# Patient Record
Sex: FEMALE | Race: WHITE | Employment: UNEMPLOYED | URBAN - METROPOLITAN AREA
[De-identification: names, ages, dates, MRNs, and addresses within clinical notes are randomized per-mention and may not be internally consistent; named-entity substitution may affect disease eponyms.]

---

## 2017-01-20 ENCOUNTER — PATIENT MESSAGE (OUTPATIENT)
Dept: FAMILY MEDICINE CLINIC | Age: 62
End: 2017-01-20

## 2017-01-20 DIAGNOSIS — N95.0 POSTMENOPAUSAL VAGINAL BLEEDING: Primary | ICD-10-CM

## 2017-02-15 ENCOUNTER — OFFICE VISIT (OUTPATIENT)
Dept: FAMILY MEDICINE CLINIC | Age: 62
End: 2017-02-15

## 2017-02-15 VITALS
RESPIRATION RATE: 12 BRPM | BODY MASS INDEX: 29.32 KG/M2 | OXYGEN SATURATION: 97 % | TEMPERATURE: 97.1 F | DIASTOLIC BLOOD PRESSURE: 78 MMHG | WEIGHT: 176 LBS | SYSTOLIC BLOOD PRESSURE: 110 MMHG | HEART RATE: 89 BPM | HEIGHT: 65 IN

## 2017-02-15 DIAGNOSIS — E78.2 MIXED HYPERLIPIDEMIA: ICD-10-CM

## 2017-02-15 DIAGNOSIS — F32.A DEPRESSION, UNSPECIFIED DEPRESSION TYPE: Primary | ICD-10-CM

## 2017-02-15 DIAGNOSIS — N20.0 KIDNEY STONES: ICD-10-CM

## 2017-02-15 DIAGNOSIS — F32.A DEPRESSION, UNSPECIFIED DEPRESSION TYPE: ICD-10-CM

## 2017-02-15 DIAGNOSIS — I10 ESSENTIAL HYPERTENSION: ICD-10-CM

## 2017-02-15 DIAGNOSIS — R53.83 FATIGUE, UNSPECIFIED TYPE: ICD-10-CM

## 2017-02-15 DIAGNOSIS — M15.9 PRIMARY OSTEOARTHRITIS INVOLVING MULTIPLE JOINTS: ICD-10-CM

## 2017-02-15 DIAGNOSIS — J30.2 SEASONAL ALLERGIC RHINITIS, UNSPECIFIED ALLERGIC RHINITIS TRIGGER: ICD-10-CM

## 2017-02-15 LAB
ALBUMIN SERPL-MCNC: 4.4 G/DL (ref 3.9–4.9)
ALP BLD-CCNC: 61 U/L (ref 40–130)
ALT SERPL-CCNC: 16 U/L (ref 0–33)
ANION GAP SERPL CALCULATED.3IONS-SCNC: 12 MEQ/L (ref 7–13)
AST SERPL-CCNC: 19 U/L (ref 0–35)
BASOPHILS ABSOLUTE: 0.1 K/UL (ref 0–0.2)
BASOPHILS RELATIVE PERCENT: 0.9 %
BILIRUB SERPL-MCNC: 0.8 MG/DL (ref 0–1.2)
BILIRUBIN, POC: 0
BLOOD URINE, POC: 0
BUN BLDV-MCNC: 15 MG/DL (ref 8–23)
C-REACTIVE PROTEIN: 2.5 MG/L (ref 0–5)
CALCIUM SERPL-MCNC: 9 MG/DL (ref 8.6–10.2)
CHLORIDE BLD-SCNC: 103 MEQ/L (ref 98–107)
CHOLESTEROL, TOTAL: 209 MG/DL (ref 0–199)
CLARITY, POC: CLEAR
CO2: 25 MEQ/L (ref 22–29)
COLOR, POC: YELLOW
CREAT SERPL-MCNC: 0.58 MG/DL (ref 0.5–0.9)
EOSINOPHILS ABSOLUTE: 0.2 K/UL (ref 0–0.7)
EOSINOPHILS RELATIVE PERCENT: 3.6 %
FOLATE: 12.7 NG/ML (ref 7.3–26.1)
GFR AFRICAN AMERICAN: >60
GFR NON-AFRICAN AMERICAN: >60
GLOBULIN: 2.3 G/DL (ref 2.3–3.5)
GLUCOSE BLD-MCNC: 78 MG/DL (ref 74–109)
GLUCOSE URINE, POC: 0
HCT VFR BLD CALC: 40.9 % (ref 37–47)
HDLC SERPL-MCNC: 72 MG/DL (ref 40–59)
HEMOGLOBIN: 14.2 G/DL (ref 12–16)
IRON SATURATION: 28 % (ref 11–46)
IRON: 90 UG/DL (ref 37–145)
KETONES, POC: 0
LDL CHOLESTEROL CALCULATED: 117 MG/DL (ref 0–129)
LEUKOCYTE EST, POC: 0
LYMPHOCYTES ABSOLUTE: 2.1 K/UL (ref 1–4.8)
LYMPHOCYTES RELATIVE PERCENT: 32.1 %
MAGNESIUM: 2.3 MG/DL (ref 1.7–2.3)
MCH RBC QN AUTO: 32.3 PG (ref 27–31.3)
MCHC RBC AUTO-ENTMCNC: 34.7 % (ref 33–37)
MCV RBC AUTO: 93.2 FL (ref 82–100)
MONOCYTES ABSOLUTE: 0.5 K/UL (ref 0.2–0.8)
MONOCYTES RELATIVE PERCENT: 8 %
NEUTROPHILS ABSOLUTE: 3.6 K/UL (ref 1.4–6.5)
NEUTROPHILS RELATIVE PERCENT: 55.4 %
NITRITE, POC: 0
PDW BLD-RTO: 13.6 % (ref 11.5–14.5)
PH, POC: 5.5
PLATELET # BLD: 237 K/UL (ref 130–400)
POTASSIUM SERPL-SCNC: 3.5 MEQ/L (ref 3.5–5.1)
PROTEIN, POC: 0
RBC # BLD: 4.39 M/UL (ref 4.2–5.4)
RHEUMATOID FACTOR: <10 IU/ML (ref 0–14)
SEDIMENTATION RATE, ERYTHROCYTE: 10 MM (ref 0–30)
SODIUM BLD-SCNC: 140 MEQ/L (ref 132–144)
SPECIFIC GRAVITY, POC: 1020
TOTAL IRON BINDING CAPACITY: 317 UG/DL (ref 178–450)
TOTAL PROTEIN: 6.7 G/DL (ref 6.4–8.1)
TRIGL SERPL-MCNC: 100 MG/DL (ref 0–200)
UROBILINOGEN, POC: 0
VITAMIN B-12: 389 PG/ML (ref 211–946)
WBC # BLD: 6.5 K/UL (ref 4.8–10.8)

## 2017-02-15 PROCEDURE — 99214 OFFICE O/P EST MOD 30 MIN: CPT | Performed by: FAMILY MEDICINE

## 2017-02-15 PROCEDURE — 81003 URINALYSIS AUTO W/O SCOPE: CPT | Performed by: FAMILY MEDICINE

## 2017-02-15 RX ORDER — FLUTICASONE PROPIONATE 50 MCG
2 SPRAY, SUSPENSION (ML) NASAL DAILY
Qty: 1 BOTTLE | Refills: 5 | Status: SHIPPED | OUTPATIENT
Start: 2017-02-15 | End: 2018-03-28 | Stop reason: ALTCHOICE

## 2017-02-15 ASSESSMENT — ENCOUNTER SYMPTOMS
RHINORRHEA: 1
EYES NEGATIVE: 1
ALLERGIC/IMMUNOLOGIC NEGATIVE: 1
RESPIRATORY NEGATIVE: 1
GASTROINTESTINAL NEGATIVE: 1
SHORTNESS OF BREATH: 0

## 2017-02-18 LAB — ANA IGG, ELISA: NORMAL

## 2017-02-20 LAB
VITAMIN D2 AND D3, TOTAL: 49.1 NG/ML (ref 30–80)
VITAMIN D2, 25 HYDROXY: <1 NG/ML
VITAMIN D3,25 HYDROXY: 49.1 NG/ML

## 2017-03-01 ENCOUNTER — OFFICE VISIT (OUTPATIENT)
Dept: FAMILY MEDICINE CLINIC | Age: 62
End: 2017-03-01

## 2017-03-01 VITALS
WEIGHT: 172 LBS | OXYGEN SATURATION: 98 % | HEART RATE: 85 BPM | SYSTOLIC BLOOD PRESSURE: 112 MMHG | BODY MASS INDEX: 28.66 KG/M2 | DIASTOLIC BLOOD PRESSURE: 72 MMHG | TEMPERATURE: 98.6 F | RESPIRATION RATE: 12 BRPM | HEIGHT: 65 IN

## 2017-03-01 DIAGNOSIS — E78.2 MIXED HYPERLIPIDEMIA: ICD-10-CM

## 2017-03-01 DIAGNOSIS — I10 ESSENTIAL HYPERTENSION: ICD-10-CM

## 2017-03-01 DIAGNOSIS — K58.1 IRRITABLE BOWEL SYNDROME WITH CONSTIPATION: Primary | ICD-10-CM

## 2017-03-01 DIAGNOSIS — Z23 NEED FOR PROPHYLACTIC VACCINATION AGAINST DIPHTHERIA-TETANUS-PERTUSSIS (DTP): ICD-10-CM

## 2017-03-01 PROCEDURE — 99214 OFFICE O/P EST MOD 30 MIN: CPT | Performed by: FAMILY MEDICINE

## 2017-03-01 PROCEDURE — 90715 TDAP VACCINE 7 YRS/> IM: CPT | Performed by: FAMILY MEDICINE

## 2017-03-01 PROCEDURE — 90471 IMMUNIZATION ADMIN: CPT | Performed by: FAMILY MEDICINE

## 2017-03-01 ASSESSMENT — ENCOUNTER SYMPTOMS
GASTROINTESTINAL NEGATIVE: 1
RESPIRATORY NEGATIVE: 1
SHORTNESS OF BREATH: 0
EYES NEGATIVE: 1
SINUS PRESSURE: 1
ALLERGIC/IMMUNOLOGIC NEGATIVE: 1

## 2017-03-02 DIAGNOSIS — K58.1 IRRITABLE BOWEL SYNDROME WITH CONSTIPATION: ICD-10-CM

## 2017-03-14 ENCOUNTER — PATIENT MESSAGE (OUTPATIENT)
Dept: FAMILY MEDICINE CLINIC | Age: 62
End: 2017-03-14

## 2017-03-14 RX ORDER — AMOXICILLIN 500 MG/1
1 TABLET, FILM COATED ORAL 2 TIMES DAILY
Qty: 40 TABLET | Refills: 0 | Status: SHIPPED | OUTPATIENT
Start: 2017-03-14 | End: 2017-04-03

## 2017-03-14 RX ORDER — PREDNISONE 10 MG/1
30 TABLET ORAL DAILY
Qty: 15 TABLET | Refills: 0 | Status: SHIPPED | OUTPATIENT
Start: 2017-03-14 | End: 2017-03-19

## 2017-03-22 ENCOUNTER — PATIENT MESSAGE (OUTPATIENT)
Dept: FAMILY MEDICINE CLINIC | Age: 62
End: 2017-03-22

## 2017-03-22 DIAGNOSIS — J32.9 CHRONIC SINUSITIS, UNSPECIFIED LOCATION: Primary | ICD-10-CM

## 2017-03-23 ENCOUNTER — PATIENT MESSAGE (OUTPATIENT)
Dept: FAMILY MEDICINE CLINIC | Age: 62
End: 2017-03-23

## 2017-03-23 RX ORDER — CEFDINIR 300 MG/1
300 CAPSULE ORAL 2 TIMES DAILY
Qty: 20 CAPSULE | Refills: 0 | Status: SHIPPED | OUTPATIENT
Start: 2017-03-23 | End: 2017-04-02

## 2017-04-13 ENCOUNTER — OFFICE VISIT (OUTPATIENT)
Dept: GASTROENTEROLOGY | Age: 62
End: 2017-04-13

## 2017-04-13 VITALS
DIASTOLIC BLOOD PRESSURE: 76 MMHG | BODY MASS INDEX: 28.62 KG/M2 | WEIGHT: 172 LBS | HEART RATE: 85 BPM | SYSTOLIC BLOOD PRESSURE: 113 MMHG

## 2017-04-13 DIAGNOSIS — R10.12 LUQ ABDOMINAL PAIN: Primary | ICD-10-CM

## 2017-04-13 DIAGNOSIS — R19.4 CHANGE IN BOWEL HABIT: ICD-10-CM

## 2017-04-13 DIAGNOSIS — K21.9 GASTROESOPHAGEAL REFLUX DISEASE WITHOUT ESOPHAGITIS: ICD-10-CM

## 2017-04-13 PROCEDURE — 99203 OFFICE O/P NEW LOW 30 MIN: CPT | Performed by: INTERNAL MEDICINE

## 2017-04-18 ENCOUNTER — HOSPITAL ENCOUNTER (OUTPATIENT)
Dept: CT IMAGING | Age: 62
Discharge: HOME | End: 2017-04-18
Payer: COMMERCIAL

## 2017-04-18 DIAGNOSIS — J32.9 RECURRENT SINUS INFECTIONS: ICD-10-CM

## 2017-04-18 PROCEDURE — 70486 CT MAXILLOFACIAL W/O DYE: CPT

## 2017-05-31 PROBLEM — M47.812 CERVICAL SPONDYLOSIS WITHOUT MYELOPATHY: Status: ACTIVE | Noted: 2017-05-31

## 2017-06-13 ENCOUNTER — TELEPHONE (OUTPATIENT)
Dept: FAMILY MEDICINE CLINIC | Age: 62
End: 2017-06-13

## 2017-07-26 ENCOUNTER — OFFICE VISIT (OUTPATIENT)
Dept: FAMILY MEDICINE CLINIC | Age: 62
End: 2017-07-26

## 2017-07-26 VITALS
RESPIRATION RATE: 15 BRPM | BODY MASS INDEX: 28.79 KG/M2 | OXYGEN SATURATION: 98 % | WEIGHT: 173 LBS | HEART RATE: 66 BPM | TEMPERATURE: 96.5 F | SYSTOLIC BLOOD PRESSURE: 118 MMHG | DIASTOLIC BLOOD PRESSURE: 70 MMHG

## 2017-07-26 DIAGNOSIS — R10.12 LEFT UPPER QUADRANT PAIN: Primary | ICD-10-CM

## 2017-07-26 DIAGNOSIS — N95.1 POSTMENOPAUSAL HYPERHIDROSIS: ICD-10-CM

## 2017-07-26 DIAGNOSIS — R61 POSTMENOPAUSAL HYPERHIDROSIS: ICD-10-CM

## 2017-07-26 PROCEDURE — 99213 OFFICE O/P EST LOW 20 MIN: CPT | Performed by: FAMILY MEDICINE

## 2017-07-26 RX ORDER — GLYCOPYRROLATE 1 MG/1
1 TABLET ORAL 3 TIMES DAILY
Qty: 90 TABLET | Refills: 3 | Status: SHIPPED | OUTPATIENT
Start: 2017-07-26 | End: 2017-08-07

## 2017-07-26 ASSESSMENT — ENCOUNTER SYMPTOMS
DIARRHEA: 0
FLATUS: 0
ABDOMINAL PAIN: 1
BELCHING: 0
HEMATOCHEZIA: 0
NAUSEA: 0
CONSTIPATION: 0
VOMITING: 0

## 2017-07-26 ASSESSMENT — CROHNS DISEASE ACTIVITY INDEX (CDAI): CDAI SCORE: 0

## 2017-07-27 DIAGNOSIS — R10.12 LEFT UPPER QUADRANT PAIN: ICD-10-CM

## 2017-07-28 LAB — H PYLORI BREATH TEST: NEGATIVE

## 2017-08-07 ENCOUNTER — HOSPITAL ENCOUNTER (OUTPATIENT)
Dept: PREADMISSION TESTING | Age: 62
Discharge: HOME | End: 2017-08-07
Payer: COMMERCIAL

## 2017-08-07 VITALS
OXYGEN SATURATION: 98 % | HEIGHT: 64 IN | TEMPERATURE: 98.9 F | BODY MASS INDEX: 29.37 KG/M2 | SYSTOLIC BLOOD PRESSURE: 128 MMHG | DIASTOLIC BLOOD PRESSURE: 75 MMHG | RESPIRATION RATE: 16 BRPM | HEART RATE: 64 BPM | WEIGHT: 172 LBS

## 2017-08-07 DIAGNOSIS — J34.3 HYPERTROPHY OF NASAL TURBINATES: Chronic | ICD-10-CM

## 2017-08-07 DIAGNOSIS — J34.2 DNS (DEVIATED NASAL SEPTUM): Chronic | ICD-10-CM

## 2017-08-07 PROBLEM — J32.9 CHRONIC SINUSITIS: Chronic | Status: ACTIVE | Noted: 2017-08-07

## 2017-08-07 LAB
ABO/RH: NORMAL
ANION GAP SERPL CALCULATED.3IONS-SCNC: 13 MEQ/L (ref 7–13)
ANTIBODY SCREEN: NORMAL
BUN BLDV-MCNC: 11 MG/DL (ref 8–23)
CALCIUM SERPL-MCNC: 9.1 MG/DL (ref 8.6–10.2)
CHLORIDE BLD-SCNC: 100 MEQ/L (ref 98–107)
CO2: 29 MEQ/L (ref 22–29)
CREAT SERPL-MCNC: 0.7 MG/DL (ref 0.5–0.9)
EKG ATRIAL RATE: 54 BPM
EKG P AXIS: 46 DEGREES
EKG P-R INTERVAL: 198 MS
EKG Q-T INTERVAL: 432 MS
EKG QRS DURATION: 90 MS
EKG QTC CALCULATION (BAZETT): 409 MS
EKG R AXIS: 33 DEGREES
EKG T AXIS: 30 DEGREES
EKG VENTRICULAR RATE: 54 BPM
GFR AFRICAN AMERICAN: >60
GFR NON-AFRICAN AMERICAN: >60
GLUCOSE BLD-MCNC: 80 MG/DL (ref 74–109)
HCT VFR BLD CALC: 42.4 % (ref 37–47)
HEMOGLOBIN: 14.4 G/DL (ref 12–16)
MCH RBC QN AUTO: 31.6 PG (ref 27–31.3)
MCHC RBC AUTO-ENTMCNC: 33.9 % (ref 33–37)
MCV RBC AUTO: 93 FL (ref 82–100)
PDW BLD-RTO: 12.8 % (ref 11.5–14.5)
PLATELET # BLD: 217 K/UL (ref 130–400)
POTASSIUM SERPL-SCNC: 4.1 MEQ/L (ref 3.5–5.1)
RBC # BLD: 4.56 M/UL (ref 4.2–5.4)
SODIUM BLD-SCNC: 142 MEQ/L (ref 132–144)
WBC # BLD: 6.2 K/UL (ref 4.8–10.8)

## 2017-08-07 PROCEDURE — 80048 BASIC METABOLIC PNL TOTAL CA: CPT

## 2017-08-07 PROCEDURE — 86901 BLOOD TYPING SEROLOGIC RH(D): CPT

## 2017-08-07 PROCEDURE — 86900 BLOOD TYPING SEROLOGIC ABO: CPT

## 2017-08-07 PROCEDURE — 86850 RBC ANTIBODY SCREEN: CPT

## 2017-08-07 PROCEDURE — 85027 COMPLETE CBC AUTOMATED: CPT

## 2017-08-07 PROCEDURE — 93005 ELECTROCARDIOGRAM TRACING: CPT

## 2017-08-07 RX ORDER — SODIUM CHLORIDE 0.9 % (FLUSH) 0.9 %
10 SYRINGE (ML) INJECTION PRN
Status: CANCELLED | OUTPATIENT
Start: 2017-08-07

## 2017-08-07 RX ORDER — LIDOCAINE HYDROCHLORIDE 10 MG/ML
1 INJECTION, SOLUTION EPIDURAL; INFILTRATION; INTRACAUDAL; PERINEURAL
Status: CANCELLED | OUTPATIENT
Start: 2017-08-07 | End: 2017-08-07

## 2017-08-07 RX ORDER — SODIUM CHLORIDE, SODIUM LACTATE, POTASSIUM CHLORIDE, CALCIUM CHLORIDE 600; 310; 30; 20 MG/100ML; MG/100ML; MG/100ML; MG/100ML
INJECTION, SOLUTION INTRAVENOUS CONTINUOUS
Status: CANCELLED | OUTPATIENT
Start: 2017-08-07

## 2017-08-07 RX ORDER — SODIUM CHLORIDE 0.9 % (FLUSH) 0.9 %
10 SYRINGE (ML) INJECTION EVERY 12 HOURS SCHEDULED
Status: CANCELLED | OUTPATIENT
Start: 2017-08-07

## 2017-08-07 ASSESSMENT — ENCOUNTER SYMPTOMS
BACK PAIN: 1
VOMITING: 0
DOUBLE VISION: 0
WHEEZING: 0
DIARRHEA: 1
SORE THROAT: 1
EYES NEGATIVE: 1
SHORTNESS OF BREATH: 0
NAUSEA: 0
EYE PAIN: 0
COUGH: 0
CONSTIPATION: 1
BLURRED VISION: 0
RESPIRATORY NEGATIVE: 1
HEARTBURN: 0

## 2017-08-10 ENCOUNTER — HOSPITAL ENCOUNTER (OUTPATIENT)
Dept: GENERAL RADIOLOGY | Age: 62
Setting detail: OUTPATIENT SURGERY
Discharge: HOME | End: 2017-08-10
Attending: OTOLARYNGOLOGY
Payer: COMMERCIAL

## 2017-08-10 ENCOUNTER — ANESTHESIA (OUTPATIENT)
Dept: OPERATING ROOM | Age: 62
End: 2017-08-10
Payer: COMMERCIAL

## 2017-08-10 ENCOUNTER — HOSPITAL ENCOUNTER (OUTPATIENT)
Age: 62
Setting detail: OUTPATIENT SURGERY
Discharge: HOME | End: 2017-08-10
Attending: OTOLARYNGOLOGY | Admitting: OTOLARYNGOLOGY
Payer: COMMERCIAL

## 2017-08-10 ENCOUNTER — ANESTHESIA EVENT (OUTPATIENT)
Dept: OPERATING ROOM | Age: 62
End: 2017-08-10
Payer: COMMERCIAL

## 2017-08-10 VITALS
DIASTOLIC BLOOD PRESSURE: 73 MMHG | OXYGEN SATURATION: 92 % | HEART RATE: 88 BPM | SYSTOLIC BLOOD PRESSURE: 126 MMHG | RESPIRATION RATE: 14 BRPM | TEMPERATURE: 98.1 F

## 2017-08-10 VITALS — OXYGEN SATURATION: 99 % | DIASTOLIC BLOOD PRESSURE: 66 MMHG | SYSTOLIC BLOOD PRESSURE: 150 MMHG | TEMPERATURE: 86.5 F

## 2017-08-10 DIAGNOSIS — R52 PAIN: ICD-10-CM

## 2017-08-10 PROCEDURE — 3700000000 HC ANESTHESIA ATTENDED CARE: Performed by: OTOLARYNGOLOGY

## 2017-08-10 PROCEDURE — 2580000003 HC RX 258: Performed by: STUDENT IN AN ORGANIZED HEALTH CARE EDUCATION/TRAINING PROGRAM

## 2017-08-10 PROCEDURE — 6360000002 HC RX W HCPCS: Performed by: ANESTHESIOLOGY

## 2017-08-10 PROCEDURE — 3209999900 FLUORO FOR SURGICAL PROCEDURES

## 2017-08-10 PROCEDURE — 7100000000 HC PACU RECOVERY - FIRST 15 MIN: Performed by: OTOLARYNGOLOGY

## 2017-08-10 PROCEDURE — 3600000014 HC SURGERY LEVEL 4 ADDTL 15MIN: Performed by: OTOLARYNGOLOGY

## 2017-08-10 PROCEDURE — 7100000011 HC PHASE II RECOVERY - ADDTL 15 MIN: Performed by: OTOLARYNGOLOGY

## 2017-08-10 PROCEDURE — 2500000003 HC RX 250 WO HCPCS: Performed by: OTOLARYNGOLOGY

## 2017-08-10 PROCEDURE — 2500000003 HC RX 250 WO HCPCS: Performed by: STUDENT IN AN ORGANIZED HEALTH CARE EDUCATION/TRAINING PROGRAM

## 2017-08-10 PROCEDURE — 7100000010 HC PHASE II RECOVERY - FIRST 15 MIN: Performed by: OTOLARYNGOLOGY

## 2017-08-10 PROCEDURE — 6370000000 HC RX 637 (ALT 250 FOR IP): Performed by: OTOLARYNGOLOGY

## 2017-08-10 PROCEDURE — 7100000001 HC PACU RECOVERY - ADDTL 15 MIN: Performed by: OTOLARYNGOLOGY

## 2017-08-10 PROCEDURE — 3600000004 HC SURGERY LEVEL 4 BASE: Performed by: OTOLARYNGOLOGY

## 2017-08-10 PROCEDURE — 88311 DECALCIFY TISSUE: CPT

## 2017-08-10 PROCEDURE — 2500000003 HC RX 250 WO HCPCS: Performed by: NURSE ANESTHETIST, CERTIFIED REGISTERED

## 2017-08-10 PROCEDURE — 2720000010 HC SURG SUPPLY STERILE: Performed by: OTOLARYNGOLOGY

## 2017-08-10 PROCEDURE — 6360000002 HC RX W HCPCS: Performed by: OTOLARYNGOLOGY

## 2017-08-10 PROCEDURE — 2580000003 HC RX 258: Performed by: OTOLARYNGOLOGY

## 2017-08-10 PROCEDURE — C1729 CATH, DRAINAGE: HCPCS | Performed by: OTOLARYNGOLOGY

## 2017-08-10 PROCEDURE — 88304 TISSUE EXAM BY PATHOLOGIST: CPT

## 2017-08-10 PROCEDURE — 6370000000 HC RX 637 (ALT 250 FOR IP): Performed by: ANESTHESIOLOGY

## 2017-08-10 PROCEDURE — 2720000003 HC MISC SUTURE/STAPLES/RELOADS/ETC: Performed by: OTOLARYNGOLOGY

## 2017-08-10 PROCEDURE — 6360000002 HC RX W HCPCS: Performed by: NURSE ANESTHETIST, CERTIFIED REGISTERED

## 2017-08-10 PROCEDURE — C1887 CATHETER, GUIDING: HCPCS | Performed by: OTOLARYNGOLOGY

## 2017-08-10 PROCEDURE — 3700000001 HC ADD 15 MINUTES (ANESTHESIA): Performed by: OTOLARYNGOLOGY

## 2017-08-10 PROCEDURE — 2740000010 HC MISC ORTHOTIC: Performed by: OTOLARYNGOLOGY

## 2017-08-10 RX ORDER — SCOLOPAMINE TRANSDERMAL SYSTEM 1 MG/1
1 PATCH, EXTENDED RELEASE TRANSDERMAL ONCE
Status: DISCONTINUED | OUTPATIENT
Start: 2017-08-10 | End: 2017-08-10 | Stop reason: HOSPADM

## 2017-08-10 RX ORDER — HYDROCODONE BITARTRATE AND ACETAMINOPHEN 5; 325 MG/1; MG/1
1 TABLET ORAL PRN
Status: DISCONTINUED | OUTPATIENT
Start: 2017-08-10 | End: 2017-08-10 | Stop reason: HOSPADM

## 2017-08-10 RX ORDER — ONDANSETRON 2 MG/ML
INJECTION INTRAMUSCULAR; INTRAVENOUS PRN
Status: DISCONTINUED | OUTPATIENT
Start: 2017-08-10 | End: 2017-08-10 | Stop reason: SDUPTHER

## 2017-08-10 RX ORDER — FENTANYL CITRATE 50 UG/ML
50 INJECTION, SOLUTION INTRAMUSCULAR; INTRAVENOUS EVERY 10 MIN PRN
Status: DISCONTINUED | OUTPATIENT
Start: 2017-08-10 | End: 2017-08-10 | Stop reason: HOSPADM

## 2017-08-10 RX ORDER — SODIUM CHLORIDE 0.9 % (FLUSH) 0.9 %
10 SYRINGE (ML) INJECTION PRN
Status: DISCONTINUED | OUTPATIENT
Start: 2017-08-10 | End: 2017-08-10 | Stop reason: HOSPADM

## 2017-08-10 RX ORDER — HYDROCODONE BITARTRATE AND ACETAMINOPHEN 5; 325 MG/1; MG/1
2 TABLET ORAL PRN
Status: DISCONTINUED | OUTPATIENT
Start: 2017-08-10 | End: 2017-08-10 | Stop reason: HOSPADM

## 2017-08-10 RX ORDER — MAGNESIUM HYDROXIDE 1200 MG/15ML
LIQUID ORAL CONTINUOUS PRN
Status: DISCONTINUED | OUTPATIENT
Start: 2017-08-10 | End: 2017-08-10 | Stop reason: HOSPADM

## 2017-08-10 RX ORDER — ROCURONIUM BROMIDE 10 MG/ML
INJECTION, SOLUTION INTRAVENOUS PRN
Status: DISCONTINUED | OUTPATIENT
Start: 2017-08-10 | End: 2017-08-10 | Stop reason: SDUPTHER

## 2017-08-10 RX ORDER — ONDANSETRON 2 MG/ML
4 INJECTION INTRAMUSCULAR; INTRAVENOUS EVERY 6 HOURS PRN
Status: DISCONTINUED | OUTPATIENT
Start: 2017-08-10 | End: 2017-08-10 | Stop reason: HOSPADM

## 2017-08-10 RX ORDER — MEPERIDINE HYDROCHLORIDE 25 MG/ML
12.5 INJECTION INTRAMUSCULAR; INTRAVENOUS; SUBCUTANEOUS EVERY 5 MIN PRN
Status: DISCONTINUED | OUTPATIENT
Start: 2017-08-10 | End: 2017-08-10 | Stop reason: HOSPADM

## 2017-08-10 RX ORDER — LIDOCAINE HYDROCHLORIDE AND EPINEPHRINE 10; 10 MG/ML; UG/ML
INJECTION, SOLUTION INFILTRATION; PERINEURAL PRN
Status: DISCONTINUED | OUTPATIENT
Start: 2017-08-10 | End: 2017-08-10 | Stop reason: HOSPADM

## 2017-08-10 RX ORDER — ONDANSETRON 2 MG/ML
4 INJECTION INTRAMUSCULAR; INTRAVENOUS
Status: DISCONTINUED | OUTPATIENT
Start: 2017-08-10 | End: 2017-08-10 | Stop reason: HOSPADM

## 2017-08-10 RX ORDER — LIDOCAINE HYDROCHLORIDE 20 MG/ML
INJECTION, SOLUTION EPIDURAL; INFILTRATION; INTRACAUDAL; PERINEURAL PRN
Status: DISCONTINUED | OUTPATIENT
Start: 2017-08-10 | End: 2017-08-10 | Stop reason: SDUPTHER

## 2017-08-10 RX ORDER — SODIUM CHLORIDE, SODIUM LACTATE, POTASSIUM CHLORIDE, CALCIUM CHLORIDE 600; 310; 30; 20 MG/100ML; MG/100ML; MG/100ML; MG/100ML
INJECTION, SOLUTION INTRAVENOUS CONTINUOUS
Status: DISCONTINUED | OUTPATIENT
Start: 2017-08-10 | End: 2017-08-10 | Stop reason: HOSPADM

## 2017-08-10 RX ORDER — PROPOFOL 10 MG/ML
INJECTION, EMULSION INTRAVENOUS PRN
Status: DISCONTINUED | OUTPATIENT
Start: 2017-08-10 | End: 2017-08-10 | Stop reason: SDUPTHER

## 2017-08-10 RX ORDER — SODIUM CHLORIDE 0.9 % (FLUSH) 0.9 %
10 SYRINGE (ML) INJECTION PRN
Status: CANCELLED | OUTPATIENT
Start: 2017-08-10

## 2017-08-10 RX ORDER — LIDOCAINE HYDROCHLORIDE 10 MG/ML
1 INJECTION, SOLUTION EPIDURAL; INFILTRATION; INTRACAUDAL; PERINEURAL
Status: COMPLETED | OUTPATIENT
Start: 2017-08-10 | End: 2017-08-10

## 2017-08-10 RX ORDER — ACETAMINOPHEN 325 MG/1
650 TABLET ORAL EVERY 4 HOURS PRN
Status: DISCONTINUED | OUTPATIENT
Start: 2017-08-10 | End: 2017-08-10 | Stop reason: HOSPADM

## 2017-08-10 RX ORDER — DEXAMETHASONE SODIUM PHOSPHATE 10 MG/ML
INJECTION INTRAMUSCULAR; INTRAVENOUS PRN
Status: DISCONTINUED | OUTPATIENT
Start: 2017-08-10 | End: 2017-08-10 | Stop reason: SDUPTHER

## 2017-08-10 RX ORDER — DIPHENHYDRAMINE HYDROCHLORIDE 50 MG/ML
12.5 INJECTION INTRAMUSCULAR; INTRAVENOUS
Status: DISCONTINUED | OUTPATIENT
Start: 2017-08-10 | End: 2017-08-10 | Stop reason: HOSPADM

## 2017-08-10 RX ORDER — FENTANYL CITRATE 50 UG/ML
INJECTION, SOLUTION INTRAMUSCULAR; INTRAVENOUS PRN
Status: DISCONTINUED | OUTPATIENT
Start: 2017-08-10 | End: 2017-08-10 | Stop reason: SDUPTHER

## 2017-08-10 RX ORDER — MIDAZOLAM HYDROCHLORIDE 1 MG/ML
INJECTION INTRAMUSCULAR; INTRAVENOUS PRN
Status: DISCONTINUED | OUTPATIENT
Start: 2017-08-10 | End: 2017-08-10 | Stop reason: SDUPTHER

## 2017-08-10 RX ORDER — EPHEDRINE SULFATE 50 MG/ML
INJECTION, SOLUTION INTRAVENOUS PRN
Status: DISCONTINUED | OUTPATIENT
Start: 2017-08-10 | End: 2017-08-10 | Stop reason: SDUPTHER

## 2017-08-10 RX ORDER — SODIUM CHLORIDE 0.9 % (FLUSH) 0.9 %
10 SYRINGE (ML) INJECTION EVERY 12 HOURS SCHEDULED
Status: CANCELLED | OUTPATIENT
Start: 2017-08-10

## 2017-08-10 RX ORDER — CLINDAMYCIN HYDROCHLORIDE 300 MG/1
300 CAPSULE ORAL 3 TIMES DAILY
Qty: 30 CAPSULE | Refills: 0 | Status: SHIPPED | OUTPATIENT
Start: 2017-08-10 | End: 2018-03-28 | Stop reason: ALTCHOICE

## 2017-08-10 RX ORDER — SODIUM CHLORIDE 0.9 % (FLUSH) 0.9 %
10 SYRINGE (ML) INJECTION EVERY 12 HOURS SCHEDULED
Status: DISCONTINUED | OUTPATIENT
Start: 2017-08-10 | End: 2017-08-10 | Stop reason: HOSPADM

## 2017-08-10 RX ORDER — METOCLOPRAMIDE HYDROCHLORIDE 5 MG/ML
10 INJECTION INTRAMUSCULAR; INTRAVENOUS
Status: DISCONTINUED | OUTPATIENT
Start: 2017-08-10 | End: 2017-08-10 | Stop reason: HOSPADM

## 2017-08-10 RX ADMIN — LIDOCAINE HYDROCHLORIDE 100 MG: 20 INJECTION, SOLUTION EPIDURAL; INFILTRATION; INTRACAUDAL; PERINEURAL at 12:47

## 2017-08-10 RX ADMIN — ONDANSETRON 4 MG: 2 INJECTION, SOLUTION INTRAMUSCULAR; INTRAVENOUS at 14:07

## 2017-08-10 RX ADMIN — MIDAZOLAM HYDROCHLORIDE 2 MG: 1 INJECTION, SOLUTION INTRAMUSCULAR; INTRAVENOUS at 12:40

## 2017-08-10 RX ADMIN — ROCURONIUM BROMIDE 30 MG: 50 INJECTION, SOLUTION INTRAVENOUS at 12:47

## 2017-08-10 RX ADMIN — EPHEDRINE SULFATE 5 MG: 50 INJECTION, SOLUTION INTRAMUSCULAR; INTRAVENOUS; SUBCUTANEOUS at 13:35

## 2017-08-10 RX ADMIN — HYDROMORPHONE HYDROCHLORIDE 0.5 MG: 1 INJECTION, SOLUTION INTRAMUSCULAR; INTRAVENOUS; SUBCUTANEOUS at 15:00

## 2017-08-10 RX ADMIN — EPHEDRINE SULFATE 5 MG: 50 INJECTION, SOLUTION INTRAMUSCULAR; INTRAVENOUS; SUBCUTANEOUS at 13:08

## 2017-08-10 RX ADMIN — SODIUM CHLORIDE, POTASSIUM CHLORIDE, SODIUM LACTATE AND CALCIUM CHLORIDE: 600; 310; 30; 20 INJECTION, SOLUTION INTRAVENOUS at 13:10

## 2017-08-10 RX ADMIN — PROPOFOL 100 MG: 10 INJECTION, EMULSION INTRAVENOUS at 13:26

## 2017-08-10 RX ADMIN — PROPOFOL 200 MG: 10 INJECTION, EMULSION INTRAVENOUS at 12:47

## 2017-08-10 RX ADMIN — ROCURONIUM BROMIDE 20 MG: 50 INJECTION, SOLUTION INTRAVENOUS at 13:26

## 2017-08-10 RX ADMIN — FENTANYL CITRATE 25 MCG: 50 INJECTION, SOLUTION INTRAMUSCULAR; INTRAVENOUS at 14:08

## 2017-08-10 RX ADMIN — FENTANYL CITRATE 100 MCG: 50 INJECTION, SOLUTION INTRAMUSCULAR; INTRAVENOUS at 12:47

## 2017-08-10 RX ADMIN — SODIUM CHLORIDE, POTASSIUM CHLORIDE, SODIUM LACTATE AND CALCIUM CHLORIDE: 600; 310; 30; 20 INJECTION, SOLUTION INTRAVENOUS at 10:24

## 2017-08-10 RX ADMIN — EPHEDRINE SULFATE 5 MG: 50 INJECTION, SOLUTION INTRAMUSCULAR; INTRAVENOUS; SUBCUTANEOUS at 13:40

## 2017-08-10 RX ADMIN — CEFAZOLIN SODIUM 2 G: 2 SOLUTION INTRAVENOUS at 12:42

## 2017-08-10 RX ADMIN — HYDROMORPHONE HYDROCHLORIDE 0.5 MG: 1 INJECTION, SOLUTION INTRAMUSCULAR; INTRAVENOUS; SUBCUTANEOUS at 14:50

## 2017-08-10 RX ADMIN — DEXAMETHASONE SODIUM PHOSPHATE 10 MG: 10 INJECTION INTRAMUSCULAR; INTRAVENOUS at 13:04

## 2017-08-10 RX ADMIN — SUGAMMADEX 312 MG: 100 INJECTION, SOLUTION INTRAVENOUS at 14:10

## 2017-08-10 RX ADMIN — EPHEDRINE SULFATE 10 MG: 50 INJECTION, SOLUTION INTRAMUSCULAR; INTRAVENOUS; SUBCUTANEOUS at 13:02

## 2017-08-10 RX ADMIN — LIDOCAINE HYDROCHLORIDE 0.1 ML: 10 INJECTION, SOLUTION EPIDURAL; INFILTRATION; INTRACAUDAL; PERINEURAL at 10:22

## 2017-08-10 ASSESSMENT — PAIN DESCRIPTION - DESCRIPTORS: DESCRIPTORS: ACHING

## 2017-08-10 ASSESSMENT — PAIN - FUNCTIONAL ASSESSMENT: PAIN_FUNCTIONAL_ASSESSMENT: 0-10

## 2017-08-10 ASSESSMENT — PAIN SCALES - GENERAL
PAINLEVEL_OUTOF10: 2
PAINLEVEL_OUTOF10: 6
PAINLEVEL_OUTOF10: 4

## 2017-08-10 ASSESSMENT — PAIN DESCRIPTION - PAIN TYPE: TYPE: SURGICAL PAIN

## 2017-08-28 ENCOUNTER — PATIENT MESSAGE (OUTPATIENT)
Dept: FAMILY MEDICINE CLINIC | Age: 62
End: 2017-08-28

## 2017-08-28 DIAGNOSIS — N39.0 URINARY TRACT INFECTION WITHOUT HEMATURIA, SITE UNSPECIFIED: ICD-10-CM

## 2017-08-28 DIAGNOSIS — N39.0 URINARY TRACT INFECTION WITHOUT HEMATURIA, SITE UNSPECIFIED: Primary | ICD-10-CM

## 2017-08-28 LAB
BILIRUBIN, POC: NORMAL
BLOOD URINE, POC: NORMAL
CLARITY, POC: NORMAL
COLOR, POC: NORMAL
GLUCOSE URINE, POC: NORMAL
KETONES, POC: NORMAL
LEUKOCYTE EST, POC: NORMAL
NITRITE, POC: NORMAL
PH, POC: 6
PROTEIN, POC: NORMAL
SPECIFIC GRAVITY, POC: 1.01
UROBILINOGEN, POC: NORMAL

## 2017-08-28 PROCEDURE — 81003 URINALYSIS AUTO W/O SCOPE: CPT | Performed by: FAMILY MEDICINE

## 2017-08-30 LAB
ORGANISM: ABNORMAL
URINE CULTURE, ROUTINE: ABNORMAL

## 2017-08-30 RX ORDER — SULFAMETHOXAZOLE AND TRIMETHOPRIM 800; 160 MG/1; MG/1
1 TABLET ORAL 2 TIMES DAILY
Qty: 20 TABLET | Refills: 0 | Status: SHIPPED | OUTPATIENT
Start: 2017-08-30 | End: 2017-09-09

## 2017-09-27 ENCOUNTER — OFFICE VISIT (OUTPATIENT)
Dept: FAMILY MEDICINE CLINIC | Age: 62
End: 2017-09-27

## 2017-09-27 VITALS
TEMPERATURE: 98.6 F | WEIGHT: 174 LBS | SYSTOLIC BLOOD PRESSURE: 110 MMHG | RESPIRATION RATE: 12 BRPM | HEART RATE: 72 BPM | HEIGHT: 63 IN | DIASTOLIC BLOOD PRESSURE: 60 MMHG | BODY MASS INDEX: 30.83 KG/M2

## 2017-09-27 DIAGNOSIS — M25.532 WRIST PAIN, CHRONIC, LEFT: Primary | ICD-10-CM

## 2017-09-27 DIAGNOSIS — G89.29 CHRONIC PAIN OF LEFT KNEE: ICD-10-CM

## 2017-09-27 DIAGNOSIS — M15.9 PRIMARY OSTEOARTHRITIS INVOLVING MULTIPLE JOINTS: ICD-10-CM

## 2017-09-27 DIAGNOSIS — I10 ESSENTIAL HYPERTENSION: ICD-10-CM

## 2017-09-27 DIAGNOSIS — K21.9 GASTROESOPHAGEAL REFLUX DISEASE WITHOUT ESOPHAGITIS: ICD-10-CM

## 2017-09-27 DIAGNOSIS — F41.9 ANXIETY: ICD-10-CM

## 2017-09-27 DIAGNOSIS — G89.29 WRIST PAIN, CHRONIC, LEFT: Primary | ICD-10-CM

## 2017-09-27 DIAGNOSIS — M25.562 CHRONIC PAIN OF LEFT KNEE: ICD-10-CM

## 2017-09-27 PROCEDURE — 99214 OFFICE O/P EST MOD 30 MIN: CPT | Performed by: FAMILY MEDICINE

## 2017-09-27 RX ORDER — HYDROCHLOROTHIAZIDE 25 MG/1
TABLET ORAL
Qty: 60 TABLET | Refills: 5 | Status: SHIPPED | OUTPATIENT
Start: 2017-09-27 | End: 2018-08-28 | Stop reason: SDUPTHER

## 2017-09-27 RX ORDER — FLUOXETINE 10 MG/1
CAPSULE ORAL
Qty: 60 CAPSULE | Refills: 5 | Status: SHIPPED | OUTPATIENT
Start: 2017-09-27 | End: 2018-10-11 | Stop reason: SDUPTHER

## 2017-09-27 RX ORDER — DEXLANSOPRAZOLE 60 MG/1
60 CAPSULE, DELAYED RELEASE ORAL DAILY
Qty: 30 CAPSULE | Refills: 5 | Status: SHIPPED | OUTPATIENT
Start: 2017-09-27 | End: 2018-03-28 | Stop reason: ALTCHOICE

## 2017-09-27 ASSESSMENT — ENCOUNTER SYMPTOMS
DIARRHEA: 0
ABDOMINAL PAIN: 1
NAUSEA: 0
CONSTIPATION: 0
ALLERGIC/IMMUNOLOGIC NEGATIVE: 1
EYES NEGATIVE: 1
RESPIRATORY NEGATIVE: 1
BELCHING: 0
HEMATOCHEZIA: 0
VOMITING: 0
SHORTNESS OF BREATH: 0
FLATUS: 0

## 2017-09-27 ASSESSMENT — PATIENT HEALTH QUESTIONNAIRE - PHQ9
1. LITTLE INTEREST OR PLEASURE IN DOING THINGS: 0
SUM OF ALL RESPONSES TO PHQ9 QUESTIONS 1 & 2: 0
SUM OF ALL RESPONSES TO PHQ QUESTIONS 1-9: 0
2. FEELING DOWN, DEPRESSED OR HOPELESS: 0

## 2017-09-27 ASSESSMENT — CROHNS DISEASE ACTIVITY INDEX (CDAI): CDAI SCORE: 0

## 2017-10-13 PROBLEM — M79.672 LEFT FOOT PAIN: Status: ACTIVE | Noted: 2017-10-13

## 2017-10-18 DIAGNOSIS — R10.13 EPIGASTRIC PAIN: ICD-10-CM

## 2017-10-18 DIAGNOSIS — K21.00 GASTROESOPHAGEAL REFLUX DISEASE WITH ESOPHAGITIS: ICD-10-CM

## 2017-10-18 RX ORDER — MONTELUKAST SODIUM 10 MG/1
TABLET ORAL
Qty: 60 TABLET | Refills: 5 | Status: SHIPPED | OUTPATIENT
Start: 2017-10-18 | End: 2018-11-01 | Stop reason: SDUPTHER

## 2017-10-18 RX ORDER — CETIRIZINE HYDROCHLORIDE 10 MG/1
TABLET ORAL
Qty: 60 TABLET | Refills: 5 | Status: SHIPPED | OUTPATIENT
Start: 2017-10-18 | End: 2018-10-31 | Stop reason: SDUPTHER

## 2017-10-18 RX ORDER — ESOMEPRAZOLE MAGNESIUM 40 MG/1
40 CAPSULE, DELAYED RELEASE ORAL
Qty: 60 CAPSULE | Refills: 5 | Status: SHIPPED | OUTPATIENT
Start: 2017-10-18 | End: 2018-10-19 | Stop reason: SDUPTHER

## 2017-12-07 RX ORDER — ESTRADIOL 1 MG/1
1 TABLET ORAL DAILY
Qty: 30 TABLET | Refills: 3 | Status: SHIPPED | OUTPATIENT
Start: 2017-12-07 | End: 2018-03-28 | Stop reason: ALTCHOICE

## 2018-01-29 ENCOUNTER — PATIENT MESSAGE (OUTPATIENT)
Dept: FAMILY MEDICINE CLINIC | Age: 63
End: 2018-01-29

## 2018-01-29 DIAGNOSIS — M25.521 RIGHT ELBOW PAIN: Primary | ICD-10-CM

## 2018-01-30 NOTE — TELEPHONE ENCOUNTER
From: Tammy Mar  To:  Tristian Reilly DO  Sent: 1/29/2018 5:08 AM EST  Subject: Non-Urgent Medical Question    Could I please get a Referal to the Orthopedic for my right Elbow

## 2018-02-12 ENCOUNTER — PATIENT MESSAGE (OUTPATIENT)
Dept: FAMILY MEDICINE CLINIC | Age: 63
End: 2018-02-12

## 2018-02-22 ENCOUNTER — PATIENT MESSAGE (OUTPATIENT)
Dept: FAMILY MEDICINE CLINIC | Age: 63
End: 2018-02-22

## 2018-02-22 DIAGNOSIS — Z12.31 VISIT FOR SCREENING MAMMOGRAM: Primary | ICD-10-CM

## 2018-02-23 RX ORDER — AMOXICILLIN 500 MG/1
1000 CAPSULE ORAL 2 TIMES DAILY
Qty: 40 CAPSULE | Refills: 0 | Status: SHIPPED | OUTPATIENT
Start: 2018-02-23 | End: 2018-03-05

## 2018-02-26 ENCOUNTER — PATIENT MESSAGE (OUTPATIENT)
Dept: FAMILY MEDICINE CLINIC | Age: 63
End: 2018-02-26

## 2018-02-26 DIAGNOSIS — N63.10 LUMP OF RIGHT BREAST: Primary | ICD-10-CM

## 2018-03-16 ENCOUNTER — PATIENT MESSAGE (OUTPATIENT)
Dept: FAMILY MEDICINE CLINIC | Age: 63
End: 2018-03-16
Payer: COMMERCIAL

## 2018-03-16 ENCOUNTER — NURSE ONLY (OUTPATIENT)
Dept: FAMILY MEDICINE CLINIC | Age: 63
End: 2018-03-16

## 2018-03-16 DIAGNOSIS — R30.9 PAIN PASSING URINE: ICD-10-CM

## 2018-03-16 DIAGNOSIS — R82.90 FOUL SMELLING URINE: ICD-10-CM

## 2018-03-16 DIAGNOSIS — N39.0 URINARY TRACT INFECTION WITHOUT HEMATURIA, SITE UNSPECIFIED: Primary | ICD-10-CM

## 2018-03-16 DIAGNOSIS — R30.9 PAIN PASSING URINE: Primary | ICD-10-CM

## 2018-03-16 LAB
BILIRUBIN, POC: NORMAL
BLOOD URINE, POC: NORMAL
CLARITY, POC: NORMAL
COLOR, POC: NORMAL
GLUCOSE URINE, POC: NORMAL
KETONES, POC: NORMAL
LEUKOCYTE EST, POC: NORMAL
NITRITE, POC: NORMAL
PH, POC: NORMAL
PROTEIN, POC: NORMAL
SPECIFIC GRAVITY, POC: 1.02
UROBILINOGEN, POC: NORMAL

## 2018-03-16 PROCEDURE — 81003 URINALYSIS AUTO W/O SCOPE: CPT | Performed by: FAMILY MEDICINE

## 2018-03-16 RX ORDER — NITROFURANTOIN 25; 75 MG/1; MG/1
100 CAPSULE ORAL 2 TIMES DAILY
Qty: 20 CAPSULE | Refills: 0 | Status: SHIPPED | OUTPATIENT
Start: 2018-03-16 | End: 2018-03-26

## 2018-03-19 LAB
ORGANISM: ABNORMAL
URINE CULTURE, ROUTINE: ABNORMAL
URINE CULTURE, ROUTINE: ABNORMAL

## 2018-03-23 DIAGNOSIS — E78.2 MIXED HYPERLIPIDEMIA: Primary | ICD-10-CM

## 2018-03-23 DIAGNOSIS — I10 ESSENTIAL HYPERTENSION: ICD-10-CM

## 2018-03-23 DIAGNOSIS — E78.2 MIXED HYPERLIPIDEMIA: ICD-10-CM

## 2018-03-23 LAB
ALBUMIN SERPL-MCNC: 4.5 G/DL (ref 3.9–4.9)
ALP BLD-CCNC: 53 U/L (ref 40–130)
ALT SERPL-CCNC: 14 U/L (ref 0–33)
ANION GAP SERPL CALCULATED.3IONS-SCNC: 13 MEQ/L (ref 7–13)
AST SERPL-CCNC: 17 U/L (ref 0–35)
BASOPHILS ABSOLUTE: 0 K/UL (ref 0–0.2)
BASOPHILS RELATIVE PERCENT: 0.8 %
BILIRUB SERPL-MCNC: 1 MG/DL (ref 0–1.2)
BUN BLDV-MCNC: 16 MG/DL (ref 8–23)
CALCIUM SERPL-MCNC: 9 MG/DL (ref 8.6–10.2)
CHLORIDE BLD-SCNC: 100 MEQ/L (ref 98–107)
CHOLESTEROL, TOTAL: 200 MG/DL (ref 0–199)
CO2: 30 MEQ/L (ref 22–29)
CREAT SERPL-MCNC: 0.73 MG/DL (ref 0.5–0.9)
EOSINOPHILS ABSOLUTE: 0.2 K/UL (ref 0–0.7)
EOSINOPHILS RELATIVE PERCENT: 4.3 %
GFR AFRICAN AMERICAN: >60
GFR NON-AFRICAN AMERICAN: >60
GLOBULIN: 2.1 G/DL (ref 2.3–3.5)
GLUCOSE BLD-MCNC: 79 MG/DL (ref 74–109)
HCT VFR BLD CALC: 40.2 % (ref 37–47)
HDLC SERPL-MCNC: 65 MG/DL (ref 40–59)
HEMOGLOBIN: 13.8 G/DL (ref 12–16)
LDL CHOLESTEROL CALCULATED: 119 MG/DL (ref 0–129)
LYMPHOCYTES ABSOLUTE: 2.1 K/UL (ref 1–4.8)
LYMPHOCYTES RELATIVE PERCENT: 37.9 %
MCH RBC QN AUTO: 32.6 PG (ref 27–31.3)
MCHC RBC AUTO-ENTMCNC: 34.3 % (ref 33–37)
MCV RBC AUTO: 95 FL (ref 82–100)
MONOCYTES ABSOLUTE: 0.6 K/UL (ref 0.2–0.8)
MONOCYTES RELATIVE PERCENT: 10.2 %
NEUTROPHILS ABSOLUTE: 2.6 K/UL (ref 1.4–6.5)
NEUTROPHILS RELATIVE PERCENT: 46.8 %
PDW BLD-RTO: 13.1 % (ref 11.5–14.5)
PLATELET # BLD: 196 K/UL (ref 130–400)
POTASSIUM SERPL-SCNC: 3.8 MEQ/L (ref 3.5–5.1)
RBC # BLD: 4.23 M/UL (ref 4.2–5.4)
SODIUM BLD-SCNC: 143 MEQ/L (ref 132–144)
TOTAL PROTEIN: 6.6 G/DL (ref 6.4–8.1)
TRIGL SERPL-MCNC: 81 MG/DL (ref 0–200)
TSH SERPL DL<=0.05 MIU/L-ACNC: 3.07 UIU/ML (ref 0.27–4.2)
WBC # BLD: 5.6 K/UL (ref 4.8–10.8)

## 2018-03-27 LAB
VITAMIN D2 AND D3, TOTAL: 37.2 NG/ML (ref 30–80)
VITAMIN D2, 25 HYDROXY: <1 NG/ML
VITAMIN D3,25 HYDROXY: 37.2 NG/ML

## 2018-03-28 ENCOUNTER — OFFICE VISIT (OUTPATIENT)
Dept: FAMILY MEDICINE CLINIC | Age: 63
End: 2018-03-28
Payer: COMMERCIAL

## 2018-03-28 VITALS
RESPIRATION RATE: 12 BRPM | BODY MASS INDEX: 30.56 KG/M2 | OXYGEN SATURATION: 98 % | HEART RATE: 68 BPM | HEIGHT: 64 IN | WEIGHT: 179 LBS | DIASTOLIC BLOOD PRESSURE: 64 MMHG | TEMPERATURE: 97.2 F | SYSTOLIC BLOOD PRESSURE: 102 MMHG

## 2018-03-28 DIAGNOSIS — M19.071 DJD (DEGENERATIVE JOINT DISEASE), ANKLE AND FOOT, RIGHT: ICD-10-CM

## 2018-03-28 DIAGNOSIS — M47.816 SPONDYLOSIS OF LUMBAR REGION WITHOUT MYELOPATHY OR RADICULOPATHY: ICD-10-CM

## 2018-03-28 DIAGNOSIS — E78.2 MIXED HYPERLIPIDEMIA: ICD-10-CM

## 2018-03-28 DIAGNOSIS — I10 ESSENTIAL HYPERTENSION: Primary | ICD-10-CM

## 2018-03-28 DIAGNOSIS — Z72.89 OTHER PROBLEMS RELATED TO LIFESTYLE: ICD-10-CM

## 2018-03-28 DIAGNOSIS — Z12.31 ENCOUNTER FOR SCREENING MAMMOGRAM FOR BREAST CANCER: ICD-10-CM

## 2018-03-28 DIAGNOSIS — M47.812 CERVICAL SPONDYLOSIS WITHOUT MYELOPATHY: ICD-10-CM

## 2018-03-28 PROCEDURE — 99214 OFFICE O/P EST MOD 30 MIN: CPT | Performed by: FAMILY MEDICINE

## 2018-03-28 RX ORDER — GABAPENTIN 300 MG/1
CAPSULE ORAL
Qty: 150 CAPSULE | Refills: 5 | Status: SHIPPED | OUTPATIENT
Start: 2018-03-28 | End: 2018-08-30

## 2018-03-28 ASSESSMENT — ENCOUNTER SYMPTOMS
RESPIRATORY NEGATIVE: 1
EYES NEGATIVE: 1
GASTROINTESTINAL NEGATIVE: 1
ALLERGIC/IMMUNOLOGIC NEGATIVE: 1

## 2018-03-28 NOTE — PROGRESS NOTES
Subjective  Luis Zeng, 59 y.o. female presents today with:  Chief Complaint   Patient presents with    Annual Exam    Hypertension    Hyperlipidemia    Gastroesophageal Reflux    Anxiety    Results     labs        Patient comes to the office today for routine recheck on hypertension, hyperlipidemia, DJD and chronic back pain. She is having some increased pain in her right foot and right wrist. She is also having difficulty with sleeping. Hypertension   This is a chronic problem. The current episode started more than 1 year ago. The problem is unchanged. The problem is controlled. Agents associated with hypertension include estrogens. Risk factors for coronary artery disease include dyslipidemia, post-menopausal state and obesity. Past treatments include diuretics. The current treatment provides significant improvement. There are no compliance problems. There is no history of angina, kidney disease, CAD/MI, CVA, heart failure, left ventricular hypertrophy, PVD, renovascular disease or retinopathy. There is no history of chronic renal disease, coarctation of the aorta, hyperaldosteronism, hypercortisolism, hyperparathyroidism, a hypertension causing med, pheochromocytoma, sleep apnea or a thyroid problem. Hyperlipidemia   This is a chronic problem. The current episode started more than 1 year ago. The problem is controlled. Recent lipid tests were reviewed and are normal. She has no history of chronic renal disease. Factors aggravating her hyperlipidemia include thiazides. Associated symptoms include myalgias. Current antihyperlipidemic treatment includes statins. The current treatment provides significant improvement of lipids. There are no compliance problems. Review of Systems   Constitutional: Negative. HENT: Negative. Eyes: Negative. Respiratory: Negative. Cardiovascular: Negative. Gastrointestinal: Negative. Endocrine: Negative. Genitourinary: Negative. and Opinion     Referral Reason:   Specialty Services Required     Referred to Provider:   Soni Maldonado DPM     Requested Specialty:   Podiatry     Number of Visits Requested:   1     Orders Placed This Encounter   Medications    gabapentin (NEURONTIN) 300 MG capsule     Sig: Po 1 bid and 3 at hs. Dispense:  150 capsule     Refill:  5     Medications Discontinued During This Encounter   Medication Reason    linaclotide (LINZESS) 290 MCG CAPS capsule Therapy completed    fluticasone (FLONASE) 50 MCG/ACT nasal spray Therapy completed    estradiol (ESTRACE) 1 MG tablet Therapy completed    clindamycin (CLEOCIN) 300 MG capsule Therapy completed    dexlansoprazole (DEXILANT) 60 MG CPDR delayed release capsule Therapy completed    gabapentin (NEURONTIN) 300 MG capsule Reorder       Counseling given: Yes      Return in about 6 months (around 9/28/2018).     Blas Villafana, DO

## 2018-04-03 ENCOUNTER — HOSPITAL ENCOUNTER (OUTPATIENT)
Dept: GENERAL RADIOLOGY | Age: 63
Discharge: HOME | End: 2018-04-05
Payer: COMMERCIAL

## 2018-04-03 DIAGNOSIS — M79.671 RIGHT FOOT PAIN: ICD-10-CM

## 2018-04-03 PROCEDURE — 73630 X-RAY EXAM OF FOOT: CPT

## 2018-04-26 ENCOUNTER — HOSPITAL ENCOUNTER (OUTPATIENT)
Dept: WOMENS IMAGING | Age: 63
Discharge: HOME | End: 2018-04-28
Payer: COMMERCIAL

## 2018-04-26 DIAGNOSIS — Z12.31 ENCOUNTER FOR SCREENING MAMMOGRAM FOR BREAST CANCER: ICD-10-CM

## 2018-04-26 DIAGNOSIS — N63.10 LUMP OF RIGHT BREAST: ICD-10-CM

## 2018-04-26 PROCEDURE — 77067 SCR MAMMO BI INCL CAD: CPT

## 2018-05-01 ENCOUNTER — TELEPHONE (OUTPATIENT)
Dept: FAMILY MEDICINE CLINIC | Age: 63
End: 2018-05-01

## 2018-05-01 DIAGNOSIS — R92.8 ABNORMAL MAMMOGRAM: Primary | ICD-10-CM

## 2018-05-16 ENCOUNTER — OFFICE VISIT (OUTPATIENT)
Dept: FAMILY MEDICINE CLINIC | Age: 63
End: 2018-05-16
Payer: COMMERCIAL

## 2018-05-16 VITALS
OXYGEN SATURATION: 98 % | WEIGHT: 176 LBS | BODY MASS INDEX: 31.18 KG/M2 | HEIGHT: 63 IN | SYSTOLIC BLOOD PRESSURE: 102 MMHG | RESPIRATION RATE: 16 BRPM | DIASTOLIC BLOOD PRESSURE: 80 MMHG | HEART RATE: 78 BPM | TEMPERATURE: 98.5 F

## 2018-05-16 DIAGNOSIS — R00.2 PALPITATION: Primary | ICD-10-CM

## 2018-05-16 DIAGNOSIS — R07.9 CHEST PAIN, UNSPECIFIED TYPE: ICD-10-CM

## 2018-05-16 PROCEDURE — 99213 OFFICE O/P EST LOW 20 MIN: CPT | Performed by: FAMILY MEDICINE

## 2018-05-16 PROCEDURE — 93000 ELECTROCARDIOGRAM COMPLETE: CPT | Performed by: FAMILY MEDICINE

## 2018-05-16 ASSESSMENT — ENCOUNTER SYMPTOMS: ABDOMINAL PAIN: 0

## 2018-05-17 ENCOUNTER — TELEPHONE (OUTPATIENT)
Dept: FAMILY MEDICINE CLINIC | Age: 63
End: 2018-05-17

## 2018-05-19 ENCOUNTER — PATIENT MESSAGE (OUTPATIENT)
Dept: FAMILY MEDICINE CLINIC | Age: 63
End: 2018-05-19

## 2018-05-24 ENCOUNTER — HOSPITAL ENCOUNTER (OUTPATIENT)
Dept: ULTRASOUND IMAGING | Age: 63
Discharge: HOME | End: 2018-05-26
Payer: COMMERCIAL

## 2018-05-24 ENCOUNTER — HOSPITAL ENCOUNTER (OUTPATIENT)
Dept: WOMENS IMAGING | Age: 63
Discharge: HOME | End: 2018-05-26
Payer: COMMERCIAL

## 2018-05-24 DIAGNOSIS — R92.8 ABNORMAL MAMMOGRAM: ICD-10-CM

## 2018-05-24 PROCEDURE — 76642 ULTRASOUND BREAST LIMITED: CPT

## 2018-05-24 PROCEDURE — 77065 DX MAMMO INCL CAD UNI: CPT

## 2018-07-20 ENCOUNTER — PATIENT MESSAGE (OUTPATIENT)
Dept: FAMILY MEDICINE CLINIC | Age: 63
End: 2018-07-20

## 2018-07-20 RX ORDER — AZITHROMYCIN 250 MG/1
TABLET, FILM COATED ORAL
Qty: 1 PACKET | Refills: 0 | Status: SHIPPED | OUTPATIENT
Start: 2018-07-20 | End: 2018-07-24

## 2018-07-20 NOTE — TELEPHONE ENCOUNTER
From: Ruby Thurman  To:  Prince White DO  Sent: 7/20/2018 2:29 PM EDT  Subject: Prescription Question    I believe that I'm experience Bronchitis do to a Sinus Infection, could you please call in a RX for me at : [facility] in [address] [facility] [phone]

## 2018-08-09 ENCOUNTER — OFFICE VISIT (OUTPATIENT)
Dept: FAMILY MEDICINE CLINIC | Age: 63
End: 2018-08-09
Payer: COMMERCIAL

## 2018-08-09 VITALS
DIASTOLIC BLOOD PRESSURE: 84 MMHG | TEMPERATURE: 98.3 F | OXYGEN SATURATION: 96 % | WEIGHT: 171 LBS | RESPIRATION RATE: 16 BRPM | SYSTOLIC BLOOD PRESSURE: 130 MMHG | HEIGHT: 64 IN | BODY MASS INDEX: 29.19 KG/M2 | HEART RATE: 74 BPM

## 2018-08-09 DIAGNOSIS — B96.89 ACUTE BACTERIAL SINUSITIS: Primary | ICD-10-CM

## 2018-08-09 DIAGNOSIS — J01.90 ACUTE BACTERIAL SINUSITIS: Primary | ICD-10-CM

## 2018-08-09 PROCEDURE — 99213 OFFICE O/P EST LOW 20 MIN: CPT | Performed by: INTERNAL MEDICINE

## 2018-08-09 RX ORDER — AMOXICILLIN AND CLAVULANATE POTASSIUM 875; 125 MG/1; MG/1
1 TABLET, FILM COATED ORAL 2 TIMES DAILY
Qty: 20 TABLET | Refills: 0 | Status: SHIPPED | OUTPATIENT
Start: 2018-08-09 | End: 2018-08-19

## 2018-08-09 ASSESSMENT — ENCOUNTER SYMPTOMS
SINUS PRESSURE: 1
SHORTNESS OF BREATH: 0
ABDOMINAL PAIN: 0
BACK PAIN: 0
SINUS PAIN: 1
EYE PAIN: 0

## 2018-08-09 NOTE — PROGRESS NOTES
well-developed and well-nourished. HENT:   Head: Normocephalic. bl tm obscured by cerumen   ttp over frontal sinuses  No anter or supraclavicular ln    Eyes: Pupils are equal, round, and reactive to light. Neck: No tracheal deviation present. Cardiovascular: Normal rate, regular rhythm and normal heart sounds. Exam reveals no gallop and no friction rub. No murmur heard. Pulmonary/Chest: No respiratory distress. She has no wheezes. She has no rales. Abdominal: Soft. Bowel sounds are normal. She exhibits no distension. There is no rebound. Musculoskeletal: She exhibits no edema. Neurological: She is oriented to person, place, and time. Skin: Skin is warm and dry. Assessment:       Diagnosis Orders   1. Acute bacterial sinusitis  amoxicillin-clavulanate (AUGMENTIN) 875-125 MG per tablet         Plan:   Meets criteria for abx given duration of symptoms. Failed Azithromycin  10 days of augmentin  Use mucinex, nettipot, and saline spray  Has seen ENT in past, if recurs change to levaquin and send back to ENT. Explained risk of worsening infection, and if it should progress despite antibiotics to call 911 immediately. Explained risk of sepsis, amputation, death, etc.     The patient was reminded that an antibiotic has been prescribed the predicted course is improvement to cure with no persistent issues. Take antibiotics as directed. If any problems occur, an appointment should be made or ER visit if severe. Because of the risk with ANY antibiotic of C. Difficile colitis if persistent diarrhea or abdominal pain or any concerning symptoms, we should be notified. To reduce this risk, a probiotic pill, yogurt or other preparations containing active cultures should be ingested daily particularly while on the antibiotic. If any persistent symptoms of illness, follow up appointment should be made in a timely fashion with a physician. Please call immediately if any issues or questions arise.

## 2018-08-28 DIAGNOSIS — I10 ESSENTIAL HYPERTENSION: ICD-10-CM

## 2018-08-28 RX ORDER — HYDROCHLOROTHIAZIDE 25 MG/1
TABLET ORAL
Qty: 60 TABLET | Refills: 11 | Status: SHIPPED | OUTPATIENT
Start: 2018-08-28 | End: 2019-10-22 | Stop reason: SDUPTHER

## 2018-08-30 ENCOUNTER — OFFICE VISIT (OUTPATIENT)
Dept: FAMILY MEDICINE CLINIC | Age: 63
End: 2018-08-30
Payer: COMMERCIAL

## 2018-08-30 ENCOUNTER — TELEPHONE (OUTPATIENT)
Dept: FAMILY MEDICINE CLINIC | Age: 63
End: 2018-08-30

## 2018-08-30 VITALS
TEMPERATURE: 98 F | RESPIRATION RATE: 12 BRPM | OXYGEN SATURATION: 98 % | HEIGHT: 64 IN | DIASTOLIC BLOOD PRESSURE: 84 MMHG | WEIGHT: 173 LBS | HEART RATE: 67 BPM | SYSTOLIC BLOOD PRESSURE: 120 MMHG | BODY MASS INDEX: 29.53 KG/M2

## 2018-08-30 DIAGNOSIS — N30.01 ACUTE CYSTITIS WITH HEMATURIA: Primary | ICD-10-CM

## 2018-08-30 DIAGNOSIS — Z87.440 HISTORY OF RECURRENT UTIS: ICD-10-CM

## 2018-08-30 DIAGNOSIS — R30.0 DYSURIA: ICD-10-CM

## 2018-08-30 LAB
BILIRUBIN, POC: 0
BLOOD URINE, POC: 80
CLARITY, POC: CLEAR
COLOR, POC: YELLOW
GLUCOSE URINE, POC: 0
KETONES, POC: 0
LEUKOCYTE EST, POC: 500
NITRITE, POC: 0
PH, POC: 6
PROTEIN, POC: 0
SPECIFIC GRAVITY, POC: 1010
UROBILINOGEN, POC: 0

## 2018-08-30 PROCEDURE — 81003 URINALYSIS AUTO W/O SCOPE: CPT | Performed by: FAMILY MEDICINE

## 2018-08-30 PROCEDURE — 99213 OFFICE O/P EST LOW 20 MIN: CPT | Performed by: FAMILY MEDICINE

## 2018-08-30 RX ORDER — METHENAMINE HIPPURATE 1000 MG/1
1 TABLET ORAL 2 TIMES DAILY WITH MEALS
Qty: 60 TABLET | Refills: 11 | Status: SHIPPED | OUTPATIENT
Start: 2018-08-30 | End: 2019-12-02

## 2018-08-30 RX ORDER — SULFAMETHOXAZOLE AND TRIMETHOPRIM 800; 160 MG/1; MG/1
1 TABLET ORAL 2 TIMES DAILY
Qty: 20 TABLET | Refills: 0 | Status: SHIPPED | OUTPATIENT
Start: 2018-08-30 | End: 2018-09-06 | Stop reason: ALTCHOICE

## 2018-08-30 ASSESSMENT — ENCOUNTER SYMPTOMS
GASTROINTESTINAL NEGATIVE: 1
VOMITING: 0
NAUSEA: 0
RESPIRATORY NEGATIVE: 1

## 2018-08-30 NOTE — PROGRESS NOTES
Serge  Jian Carmen, 59 y.o. female presents today with:  Chief Complaint   Patient presents with    Polyuria     x yesterday     Back Pain     x 4-5 days        Dysuria    This is a new problem. The current episode started yesterday. The problem occurs every urination. The problem has been unchanged. The quality of the pain is described as burning. The pain is moderate. There has been no fever. She is sexually active. There is no history of pyelonephritis. Associated symptoms include flank pain, frequency and urgency. Pertinent negatives include no chills, discharge, hematuria, hesitancy, nausea, possible pregnancy, sweats or vomiting. She has tried increased fluids for the symptoms. The treatment provided no relief. Her past medical history is significant for recurrent UTIs. There is no history of catheterization, kidney stones, a single kidney, urinary stasis or a urological procedure. Review of Systems   Constitutional: Negative. Negative for chills. HENT: Negative. Respiratory: Negative. Cardiovascular: Negative. Gastrointestinal: Negative. Negative for nausea and vomiting. Genitourinary: Positive for dysuria, flank pain, frequency and urgency. Negative for hematuria and hesitancy. Skin: Negative. Neurological: Negative. Hematological: Negative. Psychiatric/Behavioral: Negative. Past Medical History:   Diagnosis Date    Anxiety     Appendicitis     Chronic sinusitis 8/7/2017    Depression     DNS (deviated nasal septum) 8/7/2017    Hyperlipidemia     Hypertension     Hypertrophy of nasal turbinates 8/7/2017    Kidney stones     Osteoarthritis     Seasonal allergies      Past Surgical History:   Procedure Laterality Date    COSMETIC SURGERY      KIDNEY STONE SURGERY      LUMBAR LAMINECTOMY      ROTATOR CUFF REPAIR  07/25/2016    DR. DUNCAN    SINUS ENDOSCOPY N/A 8/10/2017    SEPTOPLASTY MICRODEBRIDER ASSISTED TURBINOPLASTY AND OUT-FRACTURING fail to improve.     [place] Hard, DO

## 2018-09-01 LAB
ORGANISM: ABNORMAL
URINE CULTURE, ROUTINE: ABNORMAL
URINE CULTURE, ROUTINE: ABNORMAL

## 2018-09-06 ENCOUNTER — OFFICE VISIT (OUTPATIENT)
Dept: FAMILY MEDICINE CLINIC | Age: 63
End: 2018-09-06
Payer: COMMERCIAL

## 2018-09-06 VITALS
SYSTOLIC BLOOD PRESSURE: 118 MMHG | WEIGHT: 171.4 LBS | RESPIRATION RATE: 17 BRPM | DIASTOLIC BLOOD PRESSURE: 80 MMHG | BODY MASS INDEX: 29.89 KG/M2 | HEART RATE: 56 BPM

## 2018-09-06 DIAGNOSIS — N10 ACUTE PYELONEPHRITIS: Primary | ICD-10-CM

## 2018-09-06 DIAGNOSIS — D72.829 LEUKOCYTOSIS, UNSPECIFIED TYPE: ICD-10-CM

## 2018-09-06 DIAGNOSIS — N10 ACUTE PYELONEPHRITIS: ICD-10-CM

## 2018-09-06 LAB
BASOPHILS ABSOLUTE: 0 K/UL (ref 0–0.2)
BASOPHILS RELATIVE PERCENT: 0.5 %
EOSINOPHILS ABSOLUTE: 0.2 K/UL (ref 0–0.7)
EOSINOPHILS RELATIVE PERCENT: 3.5 %
HCT VFR BLD CALC: 38.5 % (ref 37–47)
HEMOGLOBIN: 13.2 G/DL (ref 12–16)
LYMPHOCYTES ABSOLUTE: 1.6 K/UL (ref 1–4.8)
LYMPHOCYTES RELATIVE PERCENT: 27.1 %
MCH RBC QN AUTO: 33.5 PG (ref 27–31.3)
MCHC RBC AUTO-ENTMCNC: 34.2 % (ref 33–37)
MCV RBC AUTO: 98.1 FL (ref 82–100)
MONOCYTES ABSOLUTE: 0.5 K/UL (ref 0.2–0.8)
MONOCYTES RELATIVE PERCENT: 8.1 %
NEUTROPHILS ABSOLUTE: 3.5 K/UL (ref 1.4–6.5)
NEUTROPHILS RELATIVE PERCENT: 60.8 %
PDW BLD-RTO: 13.4 % (ref 11.5–14.5)
PLATELET # BLD: 263 K/UL (ref 130–400)
RBC # BLD: 3.93 M/UL (ref 4.2–5.4)
WBC # BLD: 5.7 K/UL (ref 4.8–10.8)

## 2018-09-06 PROCEDURE — 99213 OFFICE O/P EST LOW 20 MIN: CPT | Performed by: INTERNAL MEDICINE

## 2018-09-06 RX ORDER — CIPROFLOXACIN 500 MG/1
500 TABLET, FILM COATED ORAL 2 TIMES DAILY
Qty: 20 TABLET | Refills: 0 | Status: SHIPPED | OUTPATIENT
Start: 2018-09-06 | End: 2018-09-16

## 2018-09-06 ASSESSMENT — ENCOUNTER SYMPTOMS
BACK PAIN: 1
SINUS PAIN: 0
WHEEZING: 0
SINUS PRESSURE: 0
COUGH: 0
VOMITING: 0
NAUSEA: 0
ABDOMINAL PAIN: 1
SORE THROAT: 0
DIARRHEA: 0
RHINORRHEA: 0
SHORTNESS OF BREATH: 0

## 2018-09-06 NOTE — PROGRESS NOTES
activity: Yes     Partners: Male     Other Topics Concern    Not on file     Social History Narrative    No narrative on file     Family History   Problem Relation Age of Onset    High Blood Pressure Mother     High Cholesterol Mother     High Blood Pressure Father     High Cholesterol Father     No Known Problems Daughter     No Known Problems Son      Allergies:  Glycopyrrolate; Carvedilol; Remeron [mirtazapine]; and Zithromax [azithromycin]  Patient Active Problem List   Diagnosis    Anxiety    Depression    Primary osteoarthritis involving multiple joints    Mixed hyperlipidemia    Essential hypertension    Seasonal allergies    Kidney stones    Appendicitis    Palpitations    Spondylosis of lumbar region without myelopathy or radiculopathy    Sacroiliitis (HCC)    DDD (degenerative disc disease), lumbar    Cervical spondylosis without myelopathy    Chronic sinusitis    DNS (deviated nasal septum)    Hypertrophy of nasal turbinates    Gastroesophageal reflux disease without esophagitis    Left foot pain     Current Outpatient Prescriptions on File Prior to Visit   Medication Sig Dispense Refill    hydrochlorothiazide (HYDRODIURIL) 25 MG tablet TAKE ONE TABLET BY MOUTH ONCE DAILY 60 tablet 11    montelukast (SINGULAIR) 10 MG tablet TAKE ONE TABLET BY MOUTH ONCE DAILY 60 tablet 5    cetirizine (ZYRTEC) 10 MG tablet TAKE ONE TABLET BY MOUTH ONCE DAILY 60 tablet 5    esomeprazole (NEXIUM) 40 MG delayed release capsule Take 1 capsule by mouth every morning (before breakfast) 60 capsule 5    simvastatin (ZOCOR) 20 MG tablet TAKE ONE TABLET BY MOUTH IN THE EVENING 60 tablet 5    FLUoxetine (PROZAC) 10 MG capsule TAKE ONE CAPSULE BY MOUTH ONCE DAILY 60 capsule 5    methenamine (HIPREX) 1 g tablet Take 1 tablet by mouth 2 times daily (with meals) 60 tablet 11     No current facility-administered medications on file prior to visit.         Review of Systems   Constitutional: Negative for

## 2018-09-20 ENCOUNTER — OFFICE VISIT (OUTPATIENT)
Dept: FAMILY MEDICINE CLINIC | Age: 63
End: 2018-09-20
Payer: COMMERCIAL

## 2018-09-20 VITALS
WEIGHT: 171 LBS | SYSTOLIC BLOOD PRESSURE: 112 MMHG | BODY MASS INDEX: 29.82 KG/M2 | TEMPERATURE: 98 F | HEART RATE: 84 BPM | RESPIRATION RATE: 16 BRPM | DIASTOLIC BLOOD PRESSURE: 70 MMHG

## 2018-09-20 DIAGNOSIS — N10 ACUTE PYELONEPHRITIS: Primary | ICD-10-CM

## 2018-09-20 DIAGNOSIS — Z00.00 HEALTH CARE MAINTENANCE: ICD-10-CM

## 2018-09-20 LAB
BILIRUBIN, POC: NORMAL
BLOOD URINE, POC: NORMAL
CLARITY, POC: CLEAR
COLOR, POC: YELLOW
GLUCOSE URINE, POC: NORMAL
KETONES, POC: NORMAL
LEUKOCYTE EST, POC: NORMAL
NITRITE, POC: NORMAL
PH, POC: 6
PROTEIN, POC: NORMAL
SPECIFIC GRAVITY, POC: 1.01
UROBILINOGEN, POC: NORMAL

## 2018-09-20 PROCEDURE — 99213 OFFICE O/P EST LOW 20 MIN: CPT | Performed by: INTERNAL MEDICINE

## 2018-09-20 PROCEDURE — 81003 URINALYSIS AUTO W/O SCOPE: CPT | Performed by: INTERNAL MEDICINE

## 2018-09-20 ASSESSMENT — PATIENT HEALTH QUESTIONNAIRE - PHQ9
1. LITTLE INTEREST OR PLEASURE IN DOING THINGS: 0
SUM OF ALL RESPONSES TO PHQ QUESTIONS 1-9: 0
SUM OF ALL RESPONSES TO PHQ9 QUESTIONS 1 & 2: 0
SUM OF ALL RESPONSES TO PHQ QUESTIONS 1-9: 0
2. FEELING DOWN, DEPRESSED OR HOPELESS: 0

## 2018-09-20 ASSESSMENT — ENCOUNTER SYMPTOMS
WHEEZING: 0
COUGH: 0
RHINORRHEA: 0
SHORTNESS OF BREATH: 0
NAUSEA: 0
SINUS PRESSURE: 0
SINUS PAIN: 0
DIARRHEA: 0
VOMITING: 0
SORE THROAT: 0

## 2018-09-20 NOTE — PROGRESS NOTES
High Blood Pressure Father     High Cholesterol Father     No Known Problems Daughter     No Known Problems Son      Allergies:  Glycopyrrolate; Carvedilol; Remeron [mirtazapine]; and Zithromax [azithromycin]  Patient Active Problem List   Diagnosis    Anxiety    Depression    Primary osteoarthritis involving multiple joints    Mixed hyperlipidemia    Essential hypertension    Seasonal allergies    Kidney stones    Appendicitis    Palpitations    Spondylosis of lumbar region without myelopathy or radiculopathy    Sacroiliitis (HCC)    DDD (degenerative disc disease), lumbar    Cervical spondylosis without myelopathy    Chronic sinusitis    DNS (deviated nasal septum)    Hypertrophy of nasal turbinates    Gastroesophageal reflux disease without esophagitis    Left foot pain     Current Outpatient Prescriptions on File Prior to Visit   Medication Sig Dispense Refill    methenamine (HIPREX) 1 g tablet Take 1 tablet by mouth 2 times daily (with meals) 60 tablet 11    hydrochlorothiazide (HYDRODIURIL) 25 MG tablet TAKE ONE TABLET BY MOUTH ONCE DAILY 60 tablet 11    montelukast (SINGULAIR) 10 MG tablet TAKE ONE TABLET BY MOUTH ONCE DAILY 60 tablet 5    cetirizine (ZYRTEC) 10 MG tablet TAKE ONE TABLET BY MOUTH ONCE DAILY 60 tablet 5    esomeprazole (NEXIUM) 40 MG delayed release capsule Take 1 capsule by mouth every morning (before breakfast) 60 capsule 5    simvastatin (ZOCOR) 20 MG tablet TAKE ONE TABLET BY MOUTH IN THE EVENING 60 tablet 5    FLUoxetine (PROZAC) 10 MG capsule TAKE ONE CAPSULE BY MOUTH ONCE DAILY 60 capsule 5     No current facility-administered medications on file prior to visit. Review of Systems   Constitutional: Negative for chills, diaphoresis, fatigue and fever. HENT: Negative for congestion, ear discharge, ear pain, postnasal drip, rhinorrhea, sinus pain, sinus pressure, sneezing and sore throat.     Respiratory: Negative for cough, shortness of breath and wheezing. Cardiovascular: Negative for chest pain. Gastrointestinal: Negative for diarrhea, nausea and vomiting. Endocrine: Negative for cold intolerance and heat intolerance. Genitourinary: Negative for dysuria, flank pain, frequency and hematuria. Neurological: Negative for dizziness and light-headedness. Objective:   /70   Pulse 84   Temp 98 °F (36.7 °C) (Temporal)   Resp 16   Wt 171 lb (77.6 kg)   LMP 01/15/2006   BMI 29.82 kg/m²     Physical Exam   Constitutional: She is oriented to person, place, and time. She appears well-developed and well-nourished. No distress. Cardiovascular: Normal rate and regular rhythm. Pulmonary/Chest: Effort normal and breath sounds normal. No respiratory distress. Abdominal: Soft. Normal appearance and bowel sounds are normal. There is no hepatosplenomegaly. There is no tenderness. There is no CVA tenderness. Neurological: She is alert and oriented to person, place, and time. Assessment:       Diagnosis Orders   1. Acute pyelonephritis  POCT Urinalysis No Micro (Auto)    resolved   2. Health care maintenance           Plan:      Orders Placed This Encounter   Procedures    POCT Urinalysis No Micro (Auto)     No orders of the defined types were placed in this encounter. Pt declined flu vaccine today.      Return in about 8 days (around 9/28/2018) for pap test, breast exam.

## 2018-10-03 ENCOUNTER — OFFICE VISIT (OUTPATIENT)
Dept: FAMILY MEDICINE CLINIC | Age: 63
End: 2018-10-03
Payer: COMMERCIAL

## 2018-10-03 VITALS
HEART RATE: 87 BPM | RESPIRATION RATE: 14 BRPM | DIASTOLIC BLOOD PRESSURE: 82 MMHG | BODY MASS INDEX: 29.57 KG/M2 | SYSTOLIC BLOOD PRESSURE: 116 MMHG | TEMPERATURE: 98.4 F | WEIGHT: 169.6 LBS

## 2018-10-03 DIAGNOSIS — D18.01 CHERRY ANGIOMA: ICD-10-CM

## 2018-10-03 DIAGNOSIS — Z01.419 ENCOUNTER FOR GYNECOLOGICAL EXAMINATION: ICD-10-CM

## 2018-10-03 DIAGNOSIS — Z01.419 ENCOUNTER FOR GYNECOLOGICAL EXAMINATION: Primary | ICD-10-CM

## 2018-10-03 DIAGNOSIS — Z00.00 HEALTH CARE MAINTENANCE: ICD-10-CM

## 2018-10-03 DIAGNOSIS — N95.1 POST MENOPAUSAL SYNDROME: ICD-10-CM

## 2018-10-03 DIAGNOSIS — Z12.39 SCREENING BREAST EXAMINATION: ICD-10-CM

## 2018-10-03 LAB
CONTROL: PRESENT
HEMOCCULT STL QL: NEGATIVE

## 2018-10-03 PROCEDURE — 99396 PREV VISIT EST AGE 40-64: CPT | Performed by: INTERNAL MEDICINE

## 2018-10-03 PROCEDURE — 99213 OFFICE O/P EST LOW 20 MIN: CPT | Performed by: INTERNAL MEDICINE

## 2018-10-03 PROCEDURE — 82274 ASSAY TEST FOR BLOOD FECAL: CPT | Performed by: INTERNAL MEDICINE

## 2018-10-03 ASSESSMENT — ENCOUNTER SYMPTOMS
SORE THROAT: 0
NAUSEA: 0
DIARRHEA: 0
RHINORRHEA: 0
WHEEZING: 0
VOMITING: 0
SHORTNESS OF BREATH: 0
ABDOMINAL PAIN: 0
COUGH: 0
SINUS PRESSURE: 0
SINUS PAIN: 0

## 2018-10-08 ENCOUNTER — TELEPHONE (OUTPATIENT)
Dept: FAMILY MEDICINE CLINIC | Age: 63
End: 2018-10-08

## 2018-10-08 DIAGNOSIS — N95.1 MENOPAUSAL VASOMOTOR SYNDROME: Primary | ICD-10-CM

## 2018-10-08 LAB
HPV COMMENT: NORMAL
HPV TYPE 16: NOT DETECTED
HPV TYPE 18: NOT DETECTED
HPVOH (OTHER TYPES): NOT DETECTED

## 2018-10-08 NOTE — TELEPHONE ENCOUNTER
I wanted to talk to her about hormone replacement for ho flashes    Ask about liver disease, hx DVT or PE blood clots, breast cancer or migrane headaches.

## 2018-10-10 NOTE — TELEPHONE ENCOUNTER
Estrogen progesterone patches sent to the pharmacy    She has 3 more refills    We will reevaluate need for continued tx every 3 to 6 months

## 2018-10-11 DIAGNOSIS — F41.9 ANXIETY: ICD-10-CM

## 2018-10-11 RX ORDER — FLUOXETINE 10 MG/1
CAPSULE ORAL
Qty: 60 CAPSULE | Refills: 5 | Status: SHIPPED | OUTPATIENT
Start: 2018-10-11 | End: 2018-11-21 | Stop reason: DRUGHIGH

## 2018-10-14 ENCOUNTER — PATIENT MESSAGE (OUTPATIENT)
Dept: FAMILY MEDICINE CLINIC | Age: 63
End: 2018-10-14

## 2018-10-18 DIAGNOSIS — K21.00 GASTROESOPHAGEAL REFLUX DISEASE WITH ESOPHAGITIS: ICD-10-CM

## 2018-10-18 DIAGNOSIS — R10.13 EPIGASTRIC PAIN: ICD-10-CM

## 2018-10-18 RX ORDER — ESOMEPRAZOLE MAGNESIUM 40 MG/1
CAPSULE, DELAYED RELEASE ORAL
Qty: 60 CAPSULE | Refills: 5 | OUTPATIENT
Start: 2018-10-18

## 2018-10-19 DIAGNOSIS — K21.00 GASTROESOPHAGEAL REFLUX DISEASE WITH ESOPHAGITIS: ICD-10-CM

## 2018-10-19 DIAGNOSIS — R10.13 EPIGASTRIC PAIN: ICD-10-CM

## 2018-10-19 RX ORDER — ESOMEPRAZOLE MAGNESIUM 40 MG/1
40 CAPSULE, DELAYED RELEASE ORAL
Qty: 30 CAPSULE | Refills: 2 | Status: SHIPPED | OUTPATIENT
Start: 2018-10-19 | End: 2019-01-14 | Stop reason: SDUPTHER

## 2018-10-19 NOTE — TELEPHONE ENCOUNTER
I already sent this message earlier today:  Please let her know that hormone replacement generally is expensive. This includes the oral and patches. I see she's already on Prozac 10mg daily. This may be used also for her hot flashes but will not affect vaginal dryness. If she wants to try Prozac, she can double the dose to 20mg daily.

## 2018-10-26 DIAGNOSIS — Z12.11 ENCOUNTER FOR SCREENING COLONOSCOPY: Primary | ICD-10-CM

## 2018-10-30 RX ORDER — MONTELUKAST SODIUM 10 MG/1
TABLET ORAL
Qty: 60 TABLET | Refills: 5 | OUTPATIENT
Start: 2018-10-30

## 2018-10-30 RX ORDER — CETIRIZINE HYDROCHLORIDE 10 MG/1
TABLET ORAL
Qty: 30 TABLET | Refills: 11 | OUTPATIENT
Start: 2018-10-30

## 2018-11-01 DIAGNOSIS — J30.9 ALLERGIC RHINITIS, UNSPECIFIED SEASONALITY, UNSPECIFIED TRIGGER: Primary | ICD-10-CM

## 2018-11-01 RX ORDER — MONTELUKAST SODIUM 10 MG/1
TABLET ORAL
Qty: 60 TABLET | Refills: 2 | Status: SHIPPED | OUTPATIENT
Start: 2018-11-01 | End: 2019-05-01 | Stop reason: SDUPTHER

## 2018-11-06 ENCOUNTER — OFFICE VISIT (OUTPATIENT)
Dept: FAMILY MEDICINE CLINIC | Age: 63
End: 2018-11-06
Payer: COMMERCIAL

## 2018-11-06 VITALS
SYSTOLIC BLOOD PRESSURE: 122 MMHG | WEIGHT: 168 LBS | HEART RATE: 85 BPM | HEIGHT: 63 IN | OXYGEN SATURATION: 95 % | TEMPERATURE: 96.2 F | DIASTOLIC BLOOD PRESSURE: 82 MMHG | RESPIRATION RATE: 16 BRPM | BODY MASS INDEX: 29.77 KG/M2

## 2018-11-06 DIAGNOSIS — J01.90 ACUTE NON-RECURRENT SINUSITIS, UNSPECIFIED LOCATION: ICD-10-CM

## 2018-11-06 DIAGNOSIS — Z87.440 HISTORY OF RECURRENT UTIS: ICD-10-CM

## 2018-11-06 DIAGNOSIS — M54.9 MUSCULOSKELETAL BACK PAIN: Primary | ICD-10-CM

## 2018-11-06 LAB
BILIRUBIN, POC: NORMAL
BLOOD URINE, POC: NORMAL
CLARITY, POC: CLEAR
COLOR, POC: YELLOW
GLUCOSE URINE, POC: NORMAL
KETONES, POC: NORMAL
LEUKOCYTE EST, POC: NORMAL
NITRITE, POC: NORMAL
PH, POC: 6
PROTEIN, POC: NORMAL
SPECIFIC GRAVITY, POC: 1.02
UROBILINOGEN, POC: 3.5

## 2018-11-06 PROCEDURE — 81003 URINALYSIS AUTO W/O SCOPE: CPT | Performed by: INTERNAL MEDICINE

## 2018-11-06 PROCEDURE — 99213 OFFICE O/P EST LOW 20 MIN: CPT | Performed by: INTERNAL MEDICINE

## 2018-11-06 RX ORDER — NAPROXEN 375 MG/1
375 TABLET ORAL 2 TIMES DAILY PRN
Qty: 30 TABLET | Refills: 0 | Status: SHIPPED | OUTPATIENT
Start: 2018-11-06 | End: 2019-04-09 | Stop reason: SDUPTHER

## 2018-11-06 RX ORDER — TIZANIDINE 4 MG/1
4 TABLET ORAL EVERY 8 HOURS PRN
Qty: 30 TABLET | Refills: 0 | Status: SHIPPED | OUTPATIENT
Start: 2018-11-06 | End: 2018-11-07

## 2018-11-06 RX ORDER — FLUTICASONE PROPIONATE 50 MCG
1 SPRAY, SUSPENSION (ML) NASAL DAILY
Qty: 1 BOTTLE | Refills: 3 | Status: SHIPPED | OUTPATIENT
Start: 2018-11-06 | End: 2019-12-02 | Stop reason: ALTCHOICE

## 2018-11-06 ASSESSMENT — ENCOUNTER SYMPTOMS
RHINORRHEA: 1
ABDOMINAL PAIN: 0
DIARRHEA: 0
SINUS PRESSURE: 1
COUGH: 0
SHORTNESS OF BREATH: 0
NAUSEA: 0
WHEEZING: 0
VOMITING: 0
SINUS PAIN: 1
SORE THROAT: 0

## 2018-11-06 NOTE — PROGRESS NOTES
Yes     Partners: Male     Other Topics Concern    Not on file     Social History Narrative    No narrative on file     Family History   Problem Relation Age of Onset    High Blood Pressure Mother     High Cholesterol Mother     High Blood Pressure Father     High Cholesterol Father     No Known Problems Daughter     No Known Problems Son      Allergies:  Glycopyrrolate; Carvedilol; Remeron [mirtazapine]; and Zithromax [azithromycin]  Patient Active Problem List   Diagnosis    Anxiety    Depression    Primary osteoarthritis involving multiple joints    Mixed hyperlipidemia    Essential hypertension    Seasonal allergies    Kidney stones    Appendicitis    Palpitations    Spondylosis of lumbar region without myelopathy or radiculopathy    Sacroiliitis (HCC)    DDD (degenerative disc disease), lumbar    Cervical spondylosis without myelopathy    Chronic sinusitis    DNS (deviated nasal septum)    Hypertrophy of nasal turbinates    Gastroesophageal reflux disease without esophagitis    Left foot pain     Current Outpatient Prescriptions on File Prior to Visit   Medication Sig Dispense Refill    montelukast (SINGULAIR) 10 MG tablet TAKE ONE TABLET BY MOUTH ONCE DAILY 60 tablet 2    cetirizine (ZYRTEC) 10 MG tablet TAKE ONE TABLET BY MOUTH ONCE DAILY 60 tablet 5    simvastatin (ZOCOR) 20 MG tablet TAKE ONE TABLET BY MOUTH IN THE EVENING 60 tablet 5    esomeprazole (NEXIUM) 40 MG delayed release capsule Take 1 capsule by mouth every morning (before breakfast) 30 capsule 2    FLUoxetine (PROZAC) 10 MG capsule TAKE ONE CAPSULE BY MOUTH ONCE DAILY 60 capsule 5    methenamine (HIPREX) 1 g tablet Take 1 tablet by mouth 2 times daily (with meals) 60 tablet 11    hydrochlorothiazide (HYDRODIURIL) 25 MG tablet TAKE ONE TABLET BY MOUTH ONCE DAILY 60 tablet 11     No current facility-administered medications on file prior to visit.       Review of Systems   Constitutional: Negative for chills,

## 2018-11-07 DIAGNOSIS — N30.00 ACUTE CYSTITIS WITHOUT HEMATURIA: Primary | ICD-10-CM

## 2018-11-07 DIAGNOSIS — N30.00 ACUTE CYSTITIS WITHOUT HEMATURIA: ICD-10-CM

## 2018-11-07 DIAGNOSIS — Z87.440 HISTORY OF RECURRENT UTIS: ICD-10-CM

## 2018-11-07 LAB
BACTERIA: NORMAL /HPF
BILIRUBIN URINE: NEGATIVE
BLOOD, URINE: NEGATIVE
CLARITY: CLEAR
COLOR: YELLOW
EPITHELIAL CELLS, UA: NORMAL /HPF
GLUCOSE URINE: NEGATIVE MG/DL
KETONES, URINE: NEGATIVE MG/DL
LEUKOCYTE ESTERASE, URINE: ABNORMAL
NITRITE, URINE: NEGATIVE
PH UA: 5.5 (ref 5–9)
PROTEIN UA: NEGATIVE MG/DL
RBC UA: NORMAL /HPF (ref 0–2)
SPECIFIC GRAVITY UA: 1.02 (ref 1–1.03)
UROBILINOGEN, URINE: 0.2 E.U./DL
WBC UA: NORMAL /HPF (ref 0–5)

## 2018-11-07 RX ORDER — SULFAMETHOXAZOLE AND TRIMETHOPRIM 800; 160 MG/1; MG/1
1 TABLET ORAL 2 TIMES DAILY
Qty: 14 TABLET | Refills: 0 | Status: SHIPPED | OUTPATIENT
Start: 2018-11-07 | End: 2018-11-07 | Stop reason: ALTCHOICE

## 2018-11-07 RX ORDER — CIPROFLOXACIN 500 MG/1
500 TABLET, FILM COATED ORAL 2 TIMES DAILY
Qty: 10 TABLET | Refills: 0 | Status: SHIPPED | OUTPATIENT
Start: 2018-11-07 | End: 2018-11-12

## 2018-11-07 RX ORDER — CIPROFLOXACIN 500 MG/1
500 TABLET, FILM COATED ORAL 2 TIMES DAILY
Qty: 10 TABLET | Refills: 0 | Status: SHIPPED | OUTPATIENT
Start: 2018-11-07 | End: 2018-11-07 | Stop reason: ALTCHOICE

## 2018-11-08 DIAGNOSIS — N39.0 URINARY TRACT INFECTION WITHOUT HEMATURIA, SITE UNSPECIFIED: ICD-10-CM

## 2018-11-08 DIAGNOSIS — N39.0 URINARY TRACT INFECTION WITHOUT HEMATURIA, SITE UNSPECIFIED: Primary | ICD-10-CM

## 2018-11-09 LAB — URINE CULTURE, ROUTINE: NORMAL

## 2018-11-10 DIAGNOSIS — N39.0 RECURRENT UTI: Primary | ICD-10-CM

## 2018-11-14 ENCOUNTER — HOSPITAL ENCOUNTER (OUTPATIENT)
Dept: ULTRASOUND IMAGING | Age: 63
Discharge: HOME | End: 2018-11-16
Payer: COMMERCIAL

## 2018-11-14 DIAGNOSIS — N39.0 RECURRENT UTI: ICD-10-CM

## 2018-11-14 PROCEDURE — 76770 US EXAM ABDO BACK WALL COMP: CPT

## 2018-11-14 PROCEDURE — 51798 US URINE CAPACITY MEASURE: CPT

## 2018-11-21 DIAGNOSIS — N95.1 VASOMOTOR SYMPTOMS DUE TO MENOPAUSE: Primary | ICD-10-CM

## 2018-11-21 RX ORDER — FLUOXETINE HYDROCHLORIDE 20 MG/1
20 CAPSULE ORAL DAILY
Qty: 60 CAPSULE | Refills: 3 | Status: SHIPPED | OUTPATIENT
Start: 2018-11-21 | End: 2019-07-15 | Stop reason: SDUPTHER

## 2018-12-06 ENCOUNTER — PATIENT MESSAGE (OUTPATIENT)
Dept: FAMILY MEDICINE CLINIC | Age: 63
End: 2018-12-06

## 2018-12-06 NOTE — TELEPHONE ENCOUNTER
From: Sravanthi Reina  To: Lyndsey Chacko MD  Sent: 12/6/2018 6:53 AM EST  Subject: Prescription Question    Since the FDA recalled the Blood Pressure Medication that I'm on, what is next? Can I get of Blood Pressure & Colesterol Mefication to take something Natural?   There is a [facility] Meds called Eucommia Bark, can I take that instead?

## 2019-01-14 DIAGNOSIS — R10.13 EPIGASTRIC PAIN: ICD-10-CM

## 2019-01-14 DIAGNOSIS — K21.00 GASTROESOPHAGEAL REFLUX DISEASE WITH ESOPHAGITIS: ICD-10-CM

## 2019-01-15 RX ORDER — ESOMEPRAZOLE MAGNESIUM 40 MG/1
CAPSULE, DELAYED RELEASE ORAL
Qty: 30 CAPSULE | Refills: 2 | Status: SHIPPED | OUTPATIENT
Start: 2019-01-15 | End: 2019-04-29 | Stop reason: SDUPTHER

## 2019-04-03 ENCOUNTER — OFFICE VISIT (OUTPATIENT)
Dept: FAMILY MEDICINE CLINIC | Age: 64
End: 2019-04-03
Payer: COMMERCIAL

## 2019-04-03 VITALS
WEIGHT: 172.2 LBS | RESPIRATION RATE: 16 BRPM | OXYGEN SATURATION: 98 % | HEART RATE: 68 BPM | BODY MASS INDEX: 29.4 KG/M2 | DIASTOLIC BLOOD PRESSURE: 68 MMHG | HEIGHT: 64 IN | TEMPERATURE: 98.1 F | SYSTOLIC BLOOD PRESSURE: 112 MMHG

## 2019-04-03 DIAGNOSIS — M79.10 MYALGIA: ICD-10-CM

## 2019-04-03 DIAGNOSIS — Z00.00 HEALTH CARE MAINTENANCE: ICD-10-CM

## 2019-04-03 DIAGNOSIS — M19.90 ARTHRITIS: Primary | ICD-10-CM

## 2019-04-03 DIAGNOSIS — N95.1 VASOMOTOR SYMPTOMS DUE TO MENOPAUSE: ICD-10-CM

## 2019-04-03 PROCEDURE — 99213 OFFICE O/P EST LOW 20 MIN: CPT | Performed by: INTERNAL MEDICINE

## 2019-04-03 RX ORDER — MELOXICAM 7.5 MG/1
7.5 TABLET ORAL DAILY
Qty: 90 TABLET | Refills: 1 | Status: SHIPPED | OUTPATIENT
Start: 2019-04-03 | End: 2019-04-07 | Stop reason: SINTOL

## 2019-04-03 ASSESSMENT — ENCOUNTER SYMPTOMS
SINUS PAIN: 0
WHEEZING: 0
VOMITING: 0
NAUSEA: 0
SORE THROAT: 0
COUGH: 0
RHINORRHEA: 0
DIARRHEA: 0
ABDOMINAL PAIN: 0
SINUS PRESSURE: 0
SHORTNESS OF BREATH: 0

## 2019-04-03 NOTE — PROGRESS NOTES
Subjective:      Patient ID: Sreekanth Syed is a 62 y.o. female    Follow up visit    HPI     Pt presents for follow up regarding management of postmenopausal symptoms. Placed on Prozac but still has vasomotor symptoms and vaginal dryness. Pt has a uterus, hence not a candidate for estrogen therapy. Combined estrogen and progesterone is unaffordable. CC: fatigue, muscle and joint pain. Currently on Zocor. TSH was normal at last check. Past Medical History:   Diagnosis Date    Anxiety     Appendicitis     Chronic sinusitis 2017    Depression     DNS (deviated nasal septum) 2017    Hyperlipidemia     Hypertension     Hypertrophy of nasal turbinates 2017    Kidney stones     Osteoarthritis     Seasonal allergies      Past Surgical History:   Procedure Laterality Date    COSMETIC SURGERY      KIDNEY STONE SURGERY      LUMBAR LAMINECTOMY      ROTATOR CUFF REPAIR  2016    DR. DUNCAN    SINUS ENDOSCOPY N/A 8/10/2017    SEPTOPLASTY MICRODEBRIDER ASSISTED TURBINOPLASTY AND OUT-FRACTURING BILATERAL MAXILLARY BALLOON SINUPLASTY RIGHT FRONTAL BALLOON SINUPLASTY INTRANASAL ETHMOIDECTOMY  performed by Ronaldo Murguia MD at [facility]      TUBAL LIGATION       Social History     Socioeconomic History    Marital status:      Spouse name: Not on file    Number of children: Not on file    Years of education: Not on file    Highest education level: Not on file   Occupational History    Not on file   Social Needs    Financial resource strain: Not on file    Food insecurity:     Worry: Not on file     Inability: Not on file    Transportation needs:     Medical: Not on file     Non-medical: Not on file   Tobacco Use    Smoking status: Former Smoker     Packs/day: 2.00     Years: 22.00     Pack years: 44.00     Start date: [address]     Last attempt to quit: 1993     Years since quittin.2    Smokeless tobacco: Never Used   Substance and Sexual Activity    Alcohol use: Yes     Alcohol/week: 0.0 oz     Comment: occ.     Drug use: No    Sexual activity: Yes     Partners: Male   Lifestyle    Physical activity:     Days per week: Not on file     Minutes per session: Not on file    Stress: Not on file   Relationships    Social connections:     Talks on phone: Not on file     Gets together: Not on file     Attends [employer] service: Not on file     Active member of club or organization: Not on file     Attends meetings of clubs or organizations: Not on file     Relationship status: Not on file    Intimate partner violence:     Fear of current or ex partner: Not on file     Emotionally abused: Not on file     Physically abused: Not on file     Forced sexual activity: Not on file   Other Topics Concern    Not on file   Social History Narrative    Not on file     Family History   Problem Relation Age of Onset    High Blood Pressure Mother     High Cholesterol Mother     High Blood Pressure Father     High Cholesterol Father     No Known Problems Daughter     No Known Problems Son      Allergies:  Glycopyrrolate; Carvedilol; Remeron [mirtazapine]; and Zithromax [azithromycin]  Patient Active Problem List   Diagnosis    Anxiety    Depression    Primary osteoarthritis involving multiple joints    Mixed hyperlipidemia    Essential hypertension    Seasonal allergies    Kidney stones    Appendicitis    Palpitations    Spondylosis of lumbar region without myelopathy or radiculopathy    Sacroiliitis ([facility] [address]    DDD (degenerative disc disease), lumbar    Cervical spondylosis without myelopathy    Chronic sinusitis    DNS (deviated nasal septum)    Hypertrophy of nasal turbinates    Gastroesophageal reflux disease without esophagitis    Left foot pain     Current Outpatient Medications on File Prior to Visit   Medication Sig Dispense Refill    esomeprazole ([address]) 40 MG delayed release capsule TAKE 1 CAPSULE BY MOUTH IN THE MORNING normal heart sounds. Pulmonary/Chest: Effort normal and breath sounds normal. No respiratory distress. Abdominal: Soft. Normal appearance and bowel sounds are normal. She exhibits no distension. There is no hepatosplenomegaly. There is no tenderness. Musculoskeletal: Normal range of motion. She exhibits no tenderness. Neurological: She is alert and oriented to person, place, and time. Skin: Skin is warm and dry. Psychiatric: She has a normal mood and affect. Her behavior is normal.     Assessment:       Diagnosis Orders   1. Arthritis  meloxicam (MOBIC) 7.5 MG tablet   2. Myalgia     3. Vasomotor symptoms due to menopause     4. Health care maintenance  TSH Without Reflex    CBC Auto Differential    Comprehensive Metabolic Panel    Hepatitis C Antibody    HIV-1,2 Combo Ag/Ab By AVERY, Reflexive Panel    Lipid Panel      Plan:      Orders Placed This Encounter   Procedures    TSH Without Reflex     Standing Status:   Future     Standing Expiration Date:   4/3/2020    CBC Auto Differential     Standing Status:   Future     Standing Expiration Date:   4/2/2020    Comprehensive Metabolic Panel     Standing Status:   Future     Standing Expiration Date:   4/3/2020    Hepatitis C Antibody     Standing Status:   Future     Standing Expiration Date:   4/3/2020    HIV-1,2 Combo Ag/Ab By AVERY, Reflexive Panel     Standing Status:   Future     Standing Expiration Date:   4/3/2020    Lipid Panel     Standing Status:   Future     Standing Expiration Date:   4/3/2020     Order Specific Question:   Is Patient Fasting?/# of Hours     Answer:   yes     Orders Placed This Encounter   Medications    meloxicam (MOBIC) 7.5 MG tablet     Sig: Take 1 tablet by mouth daily     Dispense:  90 tablet     Refill:  1   Pt advised to cease use of Zocor. Return in about 1 month (around 5/3/2019) for assessment of response to treatment, review of test results.

## 2019-04-04 DIAGNOSIS — Z00.00 HEALTH CARE MAINTENANCE: ICD-10-CM

## 2019-04-04 LAB
ALBUMIN SERPL-MCNC: 4 G/DL (ref 3.5–4.6)
ALP BLD-CCNC: 45 U/L (ref 40–130)
ALT SERPL-CCNC: 12 U/L (ref 0–33)
ANION GAP SERPL CALCULATED.3IONS-SCNC: 15 MEQ/L (ref 9–15)
AST SERPL-CCNC: 19 U/L (ref 0–35)
BASOPHILS ABSOLUTE: 0 K/UL (ref 0–0.2)
BASOPHILS RELATIVE PERCENT: 0.7 %
BILIRUB SERPL-MCNC: 0.8 MG/DL (ref 0.2–0.7)
BUN BLDV-MCNC: 18 MG/DL (ref 8–23)
CALCIUM SERPL-MCNC: 8.8 MG/DL (ref 8.5–9.9)
CHLORIDE BLD-SCNC: 105 MEQ/L (ref 95–107)
CHOLESTEROL, TOTAL: 232 MG/DL (ref 0–199)
CO2: 25 MEQ/L (ref 20–31)
CREAT SERPL-MCNC: 0.64 MG/DL (ref 0.5–0.9)
EOSINOPHILS ABSOLUTE: 0.2 K/UL (ref 0–0.7)
EOSINOPHILS RELATIVE PERCENT: 4.6 %
GFR AFRICAN AMERICAN: >60
GFR NON-AFRICAN AMERICAN: >60
GLOBULIN: 2.4 G/DL (ref 2.3–3.5)
GLUCOSE BLD-MCNC: 82 MG/DL (ref 70–99)
HCT VFR BLD CALC: 38 % (ref 37–47)
HDLC SERPL-MCNC: 59 MG/DL (ref 40–59)
HEMOGLOBIN: 12.8 G/DL (ref 12–16)
HEPATITIS C ANTIBODY INTERPRETATION: NORMAL
LDL CHOLESTEROL CALCULATED: 137 MG/DL (ref 0–129)
LYMPHOCYTES ABSOLUTE: 1.6 K/UL (ref 1–4.8)
LYMPHOCYTES RELATIVE PERCENT: 32.7 %
MCH RBC QN AUTO: 32.7 PG (ref 27–31.3)
MCHC RBC AUTO-ENTMCNC: 33.8 % (ref 33–37)
MCV RBC AUTO: 97 FL (ref 82–100)
MONOCYTES ABSOLUTE: 0.5 K/UL (ref 0.2–0.8)
MONOCYTES RELATIVE PERCENT: 9.4 %
NEUTROPHILS ABSOLUTE: 2.6 K/UL (ref 1.4–6.5)
NEUTROPHILS RELATIVE PERCENT: 52.6 %
PDW BLD-RTO: 13.8 % (ref 11.5–14.5)
PLATELET # BLD: 254 K/UL (ref 130–400)
POTASSIUM SERPL-SCNC: 3.6 MEQ/L (ref 3.4–4.9)
RBC # BLD: 3.92 M/UL (ref 4.2–5.4)
SODIUM BLD-SCNC: 145 MEQ/L (ref 135–144)
TOTAL PROTEIN: 6.4 G/DL (ref 6.3–8)
TRIGL SERPL-MCNC: 179 MG/DL (ref 0–150)
TSH SERPL DL<=0.05 MIU/L-ACNC: 2.13 UIU/ML (ref 0.44–3.86)
WBC # BLD: 4.9 K/UL (ref 4.8–10.8)

## 2019-04-05 ENCOUNTER — TELEPHONE (OUTPATIENT)
Dept: FAMILY MEDICINE CLINIC | Age: 64
End: 2019-04-05

## 2019-04-06 NOTE — TELEPHONE ENCOUNTER
Spoke with pharmacist and states combination may cause increase in GI Bleed. States he has taken naproxen in past with paxil.  Please advise

## 2019-04-07 LAB — HIV 1,2 COMBO ANTIGEN/ANTIBODY: NEGATIVE

## 2019-04-08 DIAGNOSIS — M54.9 MUSCULOSKELETAL BACK PAIN: ICD-10-CM

## 2019-04-08 DIAGNOSIS — E78.1 HYPERTRIGLYCERIDEMIA: Primary | ICD-10-CM

## 2019-04-08 RX ORDER — PRAVASTATIN SODIUM 40 MG
40 TABLET ORAL DAILY
Qty: 30 TABLET | Refills: 1 | Status: SHIPPED | OUTPATIENT
Start: 2019-04-08 | End: 2019-06-10 | Stop reason: SDUPTHER

## 2019-04-08 NOTE — TELEPHONE ENCOUNTER
Patient was last seen on 4-3-19  Last Prescribed 11-6-18    Medication is pending  Please approve or deny this request

## 2019-04-09 ENCOUNTER — PATIENT MESSAGE (OUTPATIENT)
Dept: FAMILY MEDICINE CLINIC | Age: 64
End: 2019-04-09

## 2019-04-09 DIAGNOSIS — R17 TOTAL BILIRUBIN, ELEVATED: Primary | ICD-10-CM

## 2019-04-09 RX ORDER — NAPROXEN 375 MG/1
375 TABLET ORAL 2 TIMES DAILY PRN
Qty: 30 TABLET | Refills: 0 | Status: SHIPPED | OUTPATIENT
Start: 2019-04-09 | End: 2019-12-02 | Stop reason: ALTCHOICE

## 2019-04-09 NOTE — TELEPHONE ENCOUNTER
Please advise pt that the sodium and bilirubin elevations are very mild and might have been lab variations  She can repeat CMP in 1 week

## 2019-04-09 NOTE — TELEPHONE ENCOUNTER
From: Evelia Donovan  To: Robby Perez MD  Sent: 4/9/2019 6:23 AM EDT  Subject: Non-Urgent Medical Question    I have a question about COMPREHENSIVE METABOLIC PANEL resulted on 4/4/19 at 4:18 PM.    Should I be worried about anything here?

## 2019-04-09 NOTE — TELEPHONE ENCOUNTER
Simvastatin was DCed already die to side effects. Pravastatin will help with cholesterol but less likely to cause muscle pain    Please inform pharmacy simvastatin is already [facility].

## 2019-04-29 DIAGNOSIS — R10.13 EPIGASTRIC PAIN: ICD-10-CM

## 2019-04-29 DIAGNOSIS — K21.00 GASTROESOPHAGEAL REFLUX DISEASE WITH ESOPHAGITIS: ICD-10-CM

## 2019-04-29 NOTE — TELEPHONE ENCOUNTER
Patient was last seen on 4-3-19  Last Prescribed 1-15-19    Requesting 90 day supply    Medication is pending  Please approve or deny this request

## 2019-04-30 RX ORDER — ESOMEPRAZOLE MAGNESIUM 40 MG/1
CAPSULE, DELAYED RELEASE ORAL
Qty: 90 CAPSULE | Refills: 1 | Status: SHIPPED | OUTPATIENT
Start: 2019-04-30 | End: 2019-10-16 | Stop reason: SDUPTHER

## 2019-04-30 RX ORDER — ESOMEPRAZOLE MAGNESIUM 40 MG/1
CAPSULE, DELAYED RELEASE ORAL
Qty: 90 CAPSULE | Refills: 1 | Status: SHIPPED | OUTPATIENT
Start: 2019-04-30 | End: 2019-04-30 | Stop reason: SDUPTHER

## 2019-05-01 DIAGNOSIS — J30.9 ALLERGIC RHINITIS, UNSPECIFIED SEASONALITY, UNSPECIFIED TRIGGER: ICD-10-CM

## 2019-05-01 RX ORDER — MONTELUKAST SODIUM 10 MG/1
TABLET ORAL
Qty: 180 TABLET | Refills: 2 | Status: SHIPPED | OUTPATIENT
Start: 2019-05-01 | End: 2020-05-28

## 2019-05-01 NOTE — TELEPHONE ENCOUNTER
Pharmacy is requesting medication refill. Please approve or deny this request.    Rx requested:  Requested Prescriptions     Pending Prescriptions Disp Refills    montelukast (SINGULAIR) 10 MG tablet [Pharmacy Med Name: MONTELUKAST 10MG TAB] 180 tablet 2     Sig: TAKE 1 TABLET BY MOUTH ONCE DAILY         Last Office Visit:   4/3/2019      Next Visit Date:  No future appointments.

## 2019-05-10 DIAGNOSIS — R17 TOTAL BILIRUBIN, ELEVATED: ICD-10-CM

## 2019-05-10 DIAGNOSIS — E78.1 HYPERTRIGLYCERIDEMIA: ICD-10-CM

## 2019-05-10 LAB
ALBUMIN SERPL-MCNC: 4.3 G/DL (ref 3.5–4.6)
ALP BLD-CCNC: 43 U/L (ref 40–130)
ALT SERPL-CCNC: 12 U/L (ref 0–33)
ANION GAP SERPL CALCULATED.3IONS-SCNC: 13 MEQ/L (ref 9–15)
AST SERPL-CCNC: 14 U/L (ref 0–35)
BILIRUB SERPL-MCNC: 0.8 MG/DL (ref 0.2–0.7)
BUN BLDV-MCNC: 19 MG/DL (ref 8–23)
CALCIUM SERPL-MCNC: 8.7 MG/DL (ref 8.5–9.9)
CHLORIDE BLD-SCNC: 104 MEQ/L (ref 95–107)
CHOLESTEROL, TOTAL: 201 MG/DL (ref 0–199)
CO2: 26 MEQ/L (ref 20–31)
CREAT SERPL-MCNC: 0.64 MG/DL (ref 0.5–0.9)
GFR AFRICAN AMERICAN: >60
GFR NON-AFRICAN AMERICAN: >60
GLOBULIN: 2.2 G/DL (ref 2.3–3.5)
GLUCOSE BLD-MCNC: 70 MG/DL (ref 70–99)
HDLC SERPL-MCNC: 61 MG/DL (ref 40–59)
LDL CHOLESTEROL CALCULATED: 122 MG/DL (ref 0–129)
POTASSIUM SERPL-SCNC: 3.4 MEQ/L (ref 3.4–4.9)
SODIUM BLD-SCNC: 143 MEQ/L (ref 135–144)
TOTAL PROTEIN: 6.5 G/DL (ref 6.3–8)
TRIGL SERPL-MCNC: 88 MG/DL (ref 0–150)

## 2019-05-16 DIAGNOSIS — E78.1 HYPERTRIGLYCERIDEMIA: Primary | ICD-10-CM

## 2019-06-10 DIAGNOSIS — E78.1 HYPERTRIGLYCERIDEMIA: ICD-10-CM

## 2019-06-10 RX ORDER — PRAVASTATIN SODIUM 40 MG
TABLET ORAL
Qty: 90 TABLET | Refills: 1 | Status: SHIPPED | OUTPATIENT
Start: 2019-06-10 | End: 2019-12-23 | Stop reason: ALTCHOICE

## 2019-06-10 NOTE — TELEPHONE ENCOUNTER
Pharmacy is requesting medication refill. Please approve or deny this request.    Rx requested:  Requested Prescriptions     Pending Prescriptions Disp Refills    pravastatin (PRAVACHOL) 40 MG tablet [Pharmacy Med Name: PRAVASTATIN 40MG    TAB] 90 tablet 1     Sig: TAKE 1 TABLET BY MOUTH ONCE DAILY         Last Office Visit:   4/3/2019      Next Visit Date:  No future appointments.

## 2019-07-15 DIAGNOSIS — N95.1 VASOMOTOR SYMPTOMS DUE TO MENOPAUSE: ICD-10-CM

## 2019-07-15 RX ORDER — FLUOXETINE HYDROCHLORIDE 20 MG/1
CAPSULE ORAL
Qty: 60 CAPSULE | Refills: 3 | Status: SHIPPED | OUTPATIENT
Start: 2019-07-15 | End: 2020-03-19

## 2019-08-15 ENCOUNTER — OFFICE VISIT (OUTPATIENT)
Dept: FAMILY MEDICINE CLINIC | Age: 64
End: 2019-08-15
Payer: COMMERCIAL

## 2019-08-15 VITALS
DIASTOLIC BLOOD PRESSURE: 68 MMHG | TEMPERATURE: 96.4 F | OXYGEN SATURATION: 99 % | BODY MASS INDEX: 28.38 KG/M2 | RESPIRATION RATE: 12 BRPM | SYSTOLIC BLOOD PRESSURE: 110 MMHG | HEIGHT: 64 IN | WEIGHT: 166.2 LBS | HEART RATE: 67 BPM

## 2019-08-15 DIAGNOSIS — N30.00 ACUTE CYSTITIS WITHOUT HEMATURIA: Primary | ICD-10-CM

## 2019-08-15 DIAGNOSIS — N30.00 ACUTE CYSTITIS WITHOUT HEMATURIA: ICD-10-CM

## 2019-08-15 DIAGNOSIS — J06.9 URTI (ACUTE UPPER RESPIRATORY INFECTION): ICD-10-CM

## 2019-08-15 DIAGNOSIS — B37.31 CANDIDA VAGINITIS: ICD-10-CM

## 2019-08-15 LAB
BILIRUBIN, POC: ABNORMAL
BLOOD URINE, POC: ABNORMAL
CLARITY, POC: ABNORMAL
COLOR, POC: YELLOW
GLUCOSE URINE, POC: ABNORMAL
KETONES, POC: ABNORMAL
LEUKOCYTE EST, POC: ABNORMAL
NITRITE, POC: ABNORMAL
PH, POC: 6
PROTEIN, POC: ABNORMAL
SPECIFIC GRAVITY, POC: 1.01
UROBILINOGEN, POC: ABNORMAL

## 2019-08-15 PROCEDURE — 81003 URINALYSIS AUTO W/O SCOPE: CPT | Performed by: INTERNAL MEDICINE

## 2019-08-15 PROCEDURE — 99213 OFFICE O/P EST LOW 20 MIN: CPT | Performed by: INTERNAL MEDICINE

## 2019-08-15 RX ORDER — FLUCONAZOLE 150 MG/1
150 TABLET ORAL ONCE
Qty: 1 TABLET | Refills: 0 | Status: SHIPPED | OUTPATIENT
Start: 2019-08-15 | End: 2019-08-15

## 2019-08-15 RX ORDER — PSEUDOEPHEDRINE HYDROCHLORIDE 30 MG/1
30 TABLET ORAL EVERY 8 HOURS PRN
Qty: 30 TABLET | Refills: 0 | Status: SHIPPED | OUTPATIENT
Start: 2019-08-15 | End: 2019-12-02 | Stop reason: ALTCHOICE

## 2019-08-15 RX ORDER — CIPROFLOXACIN 500 MG/1
500 TABLET, FILM COATED ORAL 2 TIMES DAILY
Qty: 10 TABLET | Refills: 0 | Status: SHIPPED | OUTPATIENT
Start: 2019-08-15 | End: 2019-08-20

## 2019-08-15 RX ORDER — IBUPROFEN 200 MG
200 TABLET ORAL EVERY 6 HOURS PRN
Qty: 60 TABLET | Refills: 0 | Status: SHIPPED | OUTPATIENT
Start: 2019-08-15 | End: 2020-01-24

## 2019-08-15 ASSESSMENT — ENCOUNTER SYMPTOMS
RHINORRHEA: 0
COUGH: 0
NAUSEA: 0
SINUS PRESSURE: 0
DIARRHEA: 0
SINUS PAIN: 0
SHORTNESS OF BREATH: 0
ABDOMINAL PAIN: 0
VOMITING: 0
SORE THROAT: 0
WHEEZING: 0

## 2019-08-17 LAB — URINE CULTURE, ROUTINE: NORMAL

## 2019-10-16 ENCOUNTER — PATIENT MESSAGE (OUTPATIENT)
Dept: FAMILY MEDICINE CLINIC | Age: 64
End: 2019-10-16

## 2019-10-16 DIAGNOSIS — K21.00 GASTROESOPHAGEAL REFLUX DISEASE WITH ESOPHAGITIS: ICD-10-CM

## 2019-10-16 DIAGNOSIS — J30.2 SEASONAL ALLERGIES: ICD-10-CM

## 2019-10-16 DIAGNOSIS — R10.13 EPIGASTRIC PAIN: ICD-10-CM

## 2019-10-16 RX ORDER — ESOMEPRAZOLE MAGNESIUM 40 MG/1
CAPSULE, DELAYED RELEASE ORAL
Qty: 90 CAPSULE | Refills: 1 | Status: SHIPPED | OUTPATIENT
Start: 2019-10-16 | End: 2020-04-14

## 2019-10-16 RX ORDER — CETIRIZINE HYDROCHLORIDE 10 MG/1
TABLET ORAL
Qty: 60 TABLET | Refills: 5 | Status: SHIPPED | OUTPATIENT
Start: 2019-10-16 | End: 2019-12-02 | Stop reason: ALTCHOICE

## 2019-10-22 ENCOUNTER — OFFICE VISIT (OUTPATIENT)
Dept: FAMILY MEDICINE CLINIC | Age: 64
End: 2019-10-22
Payer: COMMERCIAL

## 2019-10-22 VITALS
HEIGHT: 63 IN | TEMPERATURE: 97.2 F | WEIGHT: 164.8 LBS | DIASTOLIC BLOOD PRESSURE: 80 MMHG | OXYGEN SATURATION: 97 % | HEART RATE: 71 BPM | RESPIRATION RATE: 14 BRPM | SYSTOLIC BLOOD PRESSURE: 124 MMHG | BODY MASS INDEX: 29.2 KG/M2

## 2019-10-22 DIAGNOSIS — R19.7 DIARRHEA, UNSPECIFIED TYPE: ICD-10-CM

## 2019-10-22 DIAGNOSIS — R35.0 URINARY FREQUENCY: Primary | ICD-10-CM

## 2019-10-22 DIAGNOSIS — I10 ESSENTIAL HYPERTENSION: ICD-10-CM

## 2019-10-22 LAB
BILIRUBIN, POC: NORMAL
BLOOD URINE, POC: NORMAL
CLARITY, POC: CLEAR
COLOR, POC: NORMAL
GLUCOSE URINE, POC: NORMAL
KETONES, POC: NORMAL
LEUKOCYTE EST, POC: NORMAL
NITRITE, POC: NORMAL
PH, POC: 6
PROTEIN, POC: NORMAL
SPECIFIC GRAVITY, POC: 1.01
UROBILINOGEN, POC: 3.5

## 2019-10-22 PROCEDURE — 81002 URINALYSIS NONAUTO W/O SCOPE: CPT | Performed by: INTERNAL MEDICINE

## 2019-10-22 PROCEDURE — 99214 OFFICE O/P EST MOD 30 MIN: CPT | Performed by: INTERNAL MEDICINE

## 2019-10-22 RX ORDER — HYDROCHLOROTHIAZIDE 25 MG/1
25 TABLET ORAL DAILY
Qty: 90 TABLET | Refills: 3 | Status: SHIPPED | OUTPATIENT
Start: 2019-10-22 | End: 2020-10-20

## 2019-10-22 RX ORDER — METHYLPREDNISOLONE 4 MG/1
TABLET ORAL
Refills: 0 | COMMUNITY
Start: 2019-10-17 | End: 2019-12-02 | Stop reason: ALTCHOICE

## 2019-10-22 RX ORDER — DICYCLOMINE HYDROCHLORIDE 10 MG/1
10 CAPSULE ORAL 4 TIMES DAILY
Qty: 120 CAPSULE | Refills: 3 | Status: SHIPPED | OUTPATIENT
Start: 2019-10-22 | End: 2020-12-09

## 2019-10-22 RX ORDER — AMOXICILLIN AND CLAVULANATE POTASSIUM 875; 125 MG/1; MG/1
TABLET, FILM COATED ORAL
Refills: 0 | COMMUNITY
Start: 2019-10-17 | End: 2019-12-02 | Stop reason: ALTCHOICE

## 2019-10-23 DIAGNOSIS — N30.01 ACUTE CYSTITIS WITH HEMATURIA: Primary | ICD-10-CM

## 2019-10-23 DIAGNOSIS — R35.0 URINARY FREQUENCY: ICD-10-CM

## 2019-10-23 LAB
BACTERIA: NEGATIVE /HPF
BILIRUBIN URINE: NEGATIVE
BLOOD, URINE: NEGATIVE
CLARITY: CLEAR
COLOR: YELLOW
EPITHELIAL CELLS, UA: ABNORMAL /HPF (ref 0–5)
GLUCOSE URINE: NEGATIVE MG/DL
HYALINE CASTS: ABNORMAL /HPF (ref 0–5)
KETONES, URINE: NEGATIVE MG/DL
LEUKOCYTE ESTERASE, URINE: ABNORMAL
NITRITE, URINE: NEGATIVE
PH UA: 6.5 (ref 5–9)
PROTEIN UA: NEGATIVE MG/DL
RBC UA: ABNORMAL /HPF (ref 0–5)
SPECIFIC GRAVITY UA: 1.01 (ref 1–1.03)
UROBILINOGEN, URINE: 0.2 E.U./DL
WBC UA: ABNORMAL /HPF (ref 0–5)

## 2019-10-23 RX ORDER — CIPROFLOXACIN 500 MG/1
500 TABLET, FILM COATED ORAL 2 TIMES DAILY
Qty: 14 TABLET | Refills: 0 | Status: SHIPPED | OUTPATIENT
Start: 2019-10-23 | End: 2019-10-30

## 2019-10-23 ASSESSMENT — ENCOUNTER SYMPTOMS
WHEEZING: 0
CONSTIPATION: 1
DIARRHEA: 1
ABDOMINAL PAIN: 1
COUGH: 0
SHORTNESS OF BREATH: 0
NAUSEA: 0
VOMITING: 0

## 2019-10-25 LAB — URINE CULTURE, ROUTINE: NORMAL

## 2019-12-02 ENCOUNTER — PATIENT MESSAGE (OUTPATIENT)
Dept: FAMILY MEDICINE CLINIC | Age: 64
End: 2019-12-02

## 2019-12-02 ENCOUNTER — OFFICE VISIT (OUTPATIENT)
Dept: FAMILY MEDICINE CLINIC | Age: 64
End: 2019-12-02
Payer: COMMERCIAL

## 2019-12-02 VITALS
WEIGHT: 168.4 LBS | TEMPERATURE: 97.7 F | HEIGHT: 63 IN | RESPIRATION RATE: 16 BRPM | BODY MASS INDEX: 29.84 KG/M2 | SYSTOLIC BLOOD PRESSURE: 122 MMHG | DIASTOLIC BLOOD PRESSURE: 68 MMHG | HEART RATE: 86 BPM | OXYGEN SATURATION: 96 %

## 2019-12-02 DIAGNOSIS — J01.90 ACUTE BACTERIAL SINUSITIS: Primary | ICD-10-CM

## 2019-12-02 DIAGNOSIS — J32.9 CHRONIC SINUSITIS, UNSPECIFIED LOCATION: Primary | Chronic | ICD-10-CM

## 2019-12-02 DIAGNOSIS — B96.89 ACUTE BACTERIAL SINUSITIS: Primary | ICD-10-CM

## 2019-12-02 PROCEDURE — 99213 OFFICE O/P EST LOW 20 MIN: CPT | Performed by: NURSE PRACTITIONER

## 2019-12-02 PROCEDURE — 96372 THER/PROPH/DIAG INJ SC/IM: CPT | Performed by: NURSE PRACTITIONER

## 2019-12-02 RX ORDER — LEVOFLOXACIN 500 MG/1
500 TABLET, FILM COATED ORAL DAILY
Qty: 7 TABLET | Refills: 0 | Status: SHIPPED | OUTPATIENT
Start: 2019-12-02 | End: 2019-12-09

## 2019-12-02 RX ORDER — TRIAMCINOLONE ACETONIDE 55 UG/1
1 SPRAY, METERED NASAL 2 TIMES DAILY
Qty: 16.9 G | Refills: 3 | Status: SHIPPED | OUTPATIENT
Start: 2019-12-02 | End: 2020-06-16

## 2019-12-02 RX ORDER — LEVOCETIRIZINE DIHYDROCHLORIDE 5 MG/1
5 TABLET, FILM COATED ORAL NIGHTLY
Qty: 30 TABLET | Refills: 5 | Status: SHIPPED | OUTPATIENT
Start: 2019-12-02 | End: 2019-12-02

## 2019-12-02 RX ORDER — CETIRIZINE HYDROCHLORIDE 10 MG/1
10 TABLET ORAL DAILY
Qty: 60 TABLET | Refills: 3 | Status: SHIPPED | OUTPATIENT
Start: 2019-12-02 | End: 2019-12-05

## 2019-12-02 RX ORDER — CEFTRIAXONE 500 MG/1
500 INJECTION, POWDER, FOR SOLUTION INTRAMUSCULAR; INTRAVENOUS ONCE
Status: COMPLETED | OUTPATIENT
Start: 2019-12-02 | End: 2019-12-02

## 2019-12-02 RX ADMIN — CEFTRIAXONE 500 MG: 500 INJECTION, POWDER, FOR SOLUTION INTRAMUSCULAR; INTRAVENOUS at 10:10

## 2019-12-03 ENCOUNTER — TELEPHONE (OUTPATIENT)
Dept: FAMILY MEDICINE CLINIC | Age: 64
End: 2019-12-03

## 2019-12-04 ENCOUNTER — PATIENT MESSAGE (OUTPATIENT)
Dept: FAMILY MEDICINE CLINIC | Age: 64
End: 2019-12-04

## 2019-12-04 RX ORDER — CEFDINIR 300 MG/1
300 CAPSULE ORAL 2 TIMES DAILY
Qty: 14 CAPSULE | Refills: 0 | Status: SHIPPED | OUTPATIENT
Start: 2019-12-04 | End: 2019-12-11

## 2019-12-05 DIAGNOSIS — B96.89 ACUTE BACTERIAL SINUSITIS: ICD-10-CM

## 2019-12-05 DIAGNOSIS — J01.90 ACUTE BACTERIAL SINUSITIS: ICD-10-CM

## 2019-12-05 RX ORDER — LEVOCETIRIZINE DIHYDROCHLORIDE 5 MG/1
5 TABLET, FILM COATED ORAL NIGHTLY
Qty: 30 TABLET | Refills: 5 | Status: SHIPPED | OUTPATIENT
Start: 2019-12-05

## 2019-12-05 ASSESSMENT — ENCOUNTER SYMPTOMS
SORE THROAT: 0
SHORTNESS OF BREATH: 0
SINUS PRESSURE: 1
HOARSE VOICE: 1
COUGH: 0
SWOLLEN GLANDS: 0

## 2019-12-17 DIAGNOSIS — E78.1 HYPERTRIGLYCERIDEMIA: ICD-10-CM

## 2019-12-17 LAB
CHOLESTEROL, TOTAL: 221 MG/DL (ref 0–199)
HDLC SERPL-MCNC: 61 MG/DL (ref 40–59)
LDL CHOLESTEROL CALCULATED: 135 MG/DL (ref 0–129)
TRIGL SERPL-MCNC: 126 MG/DL (ref 0–150)

## 2019-12-23 ENCOUNTER — TELEPHONE (OUTPATIENT)
Dept: FAMILY MEDICINE CLINIC | Age: 64
End: 2019-12-23

## 2019-12-23 DIAGNOSIS — E78.2 MIXED HYPERLIPIDEMIA: Primary | ICD-10-CM

## 2019-12-23 RX ORDER — ROSUVASTATIN CALCIUM 40 MG/1
40 TABLET, COATED ORAL DAILY
Qty: 90 TABLET | Refills: 3 | Status: SHIPPED | OUTPATIENT
Start: 2019-12-23 | End: 2020-11-30

## 2020-01-10 DIAGNOSIS — R19.7 DIARRHEA, UNSPECIFIED TYPE: ICD-10-CM

## 2020-01-10 LAB — LACTOFERRIN, FECAL: NEGATIVE

## 2020-01-11 LAB
C DIFF TOXIN/ANTIGEN: NORMAL
C. DIFFICILE TOXIN MOLECULAR: ABNORMAL

## 2020-01-12 RX ORDER — METRONIDAZOLE 500 MG/1
500 TABLET ORAL 3 TIMES DAILY
Qty: 30 TABLET | Refills: 0 | Status: SHIPPED | OUTPATIENT
Start: 2020-01-12 | End: 2020-01-22

## 2020-01-15 RX ORDER — VANCOMYCIN HYDROCHLORIDE 125 MG/1
125 CAPSULE ORAL 4 TIMES DAILY
Qty: 40 CAPSULE | Refills: 0 | Status: SHIPPED | OUTPATIENT
Start: 2020-01-15 | End: 2020-01-24 | Stop reason: ALTCHOICE

## 2020-01-24 ENCOUNTER — OFFICE VISIT (OUTPATIENT)
Dept: FAMILY MEDICINE CLINIC | Age: 65
End: 2020-01-24
Payer: COMMERCIAL

## 2020-01-24 VITALS
HEIGHT: 63 IN | WEIGHT: 169.4 LBS | OXYGEN SATURATION: 94 % | DIASTOLIC BLOOD PRESSURE: 78 MMHG | BODY MASS INDEX: 30.02 KG/M2 | SYSTOLIC BLOOD PRESSURE: 124 MMHG | HEART RATE: 98 BPM | RESPIRATION RATE: 16 BRPM | TEMPERATURE: 98.6 F

## 2020-01-24 PROCEDURE — 99213 OFFICE O/P EST LOW 20 MIN: CPT | Performed by: INTERNAL MEDICINE

## 2020-01-24 RX ORDER — FLUOXETINE 10 MG/1
10 CAPSULE ORAL DAILY
Qty: 30 CAPSULE | Refills: 3 | Status: SHIPPED | OUTPATIENT
Start: 2020-01-24 | End: 2020-05-21

## 2020-01-24 NOTE — PROGRESS NOTES
27.0    Smokeless tobacco: Never Used   Substance and Sexual Activity    Alcohol use: Yes     Alcohol/week: 0.0 standard drinks     Comment: occ.     Drug use: No    Sexual activity: Yes     Partners: Male   Lifestyle    Physical activity:     Days per week: Not on file     Minutes per session: Not on file    Stress: Not on file   Relationships    Social connections:     Talks on phone: Not on file     Gets together: Not on file     Attends Mu-ism service: Not on file     Active member of club or organization: Not on file     Attends meetings of clubs or organizations: Not on file     Relationship status: Not on file    Intimate partner violence:     Fear of current or ex partner: Not on file     Emotionally abused: Not on file     Physically abused: Not on file     Forced sexual activity: Not on file   Other Topics Concern    Not on file   Social History Narrative    Not on file     Family History   Problem Relation Age of Onset    High Blood Pressure Mother     High Cholesterol Mother     High Blood Pressure Father     High Cholesterol Father     No Known Problems Daughter     No Known Problems Son      Allergies:  Glycopyrrolate; Carvedilol; Remeron [mirtazapine]; and Zithromax [azithromycin]  Patient Active Problem List   Diagnosis    Anxiety    Depression    Primary osteoarthritis involving multiple joints    Mixed hyperlipidemia    Essential hypertension    Seasonal allergies    Kidney stones    Appendicitis    Palpitations    Spondylosis of lumbar region without myelopathy or radiculopathy    Sacroiliitis ([facility] [address]    DDD (degenerative disc disease), lumbar    Cervical spondylosis without myelopathy    Chronic sinusitis    DNS (deviated nasal septum)    Hypertrophy of nasal turbinates    Gastroesophageal reflux disease without esophagitis    Left foot pain     Current Outpatient Medications on File Prior to Visit   Medication Sig Dispense Refill    rosuvastatin (CRESTOR) 40 MG tablet Take 1 tablet by mouth daily 90 tablet 3    levocetirizine (XYZAL) 5 MG tablet Take 1 tablet by mouth nightly 30 tablet 5    triamcinolone (NASACORT) 55 MCG/ACT nasal inhaler 1 spray by Nasal route 2 times daily 16.9 g 3    dicyclomine (BENTYL) 10 MG capsule Take 1 capsule by mouth 4 times daily 120 capsule 3    hydrochlorothiazide (HYDRODIURIL) 25 MG tablet Take 1 tablet by mouth daily 90 tablet 3    esomeprazole (NEXIUM) 40 MG delayed release capsule TAKE 1 CAPSULE BY MOUTH IN THE MORNING BEFORE  BREAKFAST 90 capsule 1    FLUoxetine (PROZAC) 20 MG capsule TAKE 1 CAPSULE BY MOUTH ONCE DAILY 60 capsule 3    montelukast (SINGULAIR) 10 MG tablet TAKE 1 TABLET BY MOUTH ONCE DAILY 180 tablet 2     No current facility-administered medications on file prior to visit. Review of Systems   Constitutional: Negative for chills, diaphoresis, fatigue and fever. HENT: Negative for congestion, ear discharge and ear pain. Respiratory: Negative for cough, shortness of breath and wheezing. Cardiovascular: Negative for chest pain. Gastrointestinal: Negative for abdominal pain, diarrhea, nausea and vomiting. Endocrine: Negative for cold intolerance and heat intolerance. Genitourinary: Negative for dysuria and frequency. Neurological: Negative for dizziness and light-headedness. Psychiatric/Behavioral: Positive for dysphoric mood. Negative for agitation, hallucinations and suicidal ideas. The patient is nervous/anxious. Objective:   /78 (Site: Left Upper Arm, Position: Sitting, Cuff Size: Medium Adult)   Pulse 98   Temp 98.6 °F (37 °C) (Temporal)   Resp 16   Ht 5' 3\" (1.6 m)   Wt 169 lb 6.4 oz (76.8 kg)   LMP 01/15/2006   SpO2 94%   BMI 30.01 kg/m²     Physical Exam  Constitutional:       General: She is not in acute distress. Appearance: Normal appearance. She is well-developed. Cardiovascular:      Rate and Rhythm: Normal rate and regular rhythm. Pulses: Normal pulses.

## 2020-01-27 ASSESSMENT — ENCOUNTER SYMPTOMS
VOMITING: 0
WHEEZING: 0
DIARRHEA: 0
NAUSEA: 0
COUGH: 0
SHORTNESS OF BREATH: 0
ABDOMINAL PAIN: 0

## 2020-02-14 ENCOUNTER — OFFICE VISIT (OUTPATIENT)
Dept: FAMILY MEDICINE CLINIC | Age: 65
End: 2020-02-14
Payer: COMMERCIAL

## 2020-02-14 VITALS
SYSTOLIC BLOOD PRESSURE: 100 MMHG | TEMPERATURE: 98.2 F | BODY MASS INDEX: 29.77 KG/M2 | RESPIRATION RATE: 12 BRPM | HEART RATE: 72 BPM | HEIGHT: 63 IN | DIASTOLIC BLOOD PRESSURE: 64 MMHG | OXYGEN SATURATION: 97 % | WEIGHT: 168 LBS

## 2020-02-14 LAB
BILIRUBIN, POC: NORMAL
BLOOD URINE, POC: NORMAL
CLARITY, POC: NORMAL
COLOR, POC: YELLOW
GLUCOSE URINE, POC: NORMAL
KETONES, POC: NORMAL
LEUKOCYTE EST, POC: NORMAL
NITRITE, POC: NORMAL
PH, POC: 6
PROTEIN, POC: NORMAL
SPECIFIC GRAVITY, POC: 1.01
UROBILINOGEN, POC: NORMAL

## 2020-02-14 PROCEDURE — 99214 OFFICE O/P EST MOD 30 MIN: CPT | Performed by: INTERNAL MEDICINE

## 2020-02-14 PROCEDURE — 81003 URINALYSIS AUTO W/O SCOPE: CPT | Performed by: INTERNAL MEDICINE

## 2020-02-14 RX ORDER — NAPROXEN 500 MG/1
500 TABLET ORAL 2 TIMES DAILY PRN
Qty: 60 TABLET | Refills: 0 | Status: SHIPPED | OUTPATIENT
Start: 2020-02-14 | End: 2020-06-16

## 2020-02-14 NOTE — PROGRESS NOTES
pain     Current Outpatient Medications on File Prior to Visit   Medication Sig Dispense Refill    FLUoxetine (PROZAC) 10 MG capsule Take 1 capsule by mouth daily Take in addition to 20mg prozac to make a daily total of 30mg 30 capsule 3    rosuvastatin (CRESTOR) 40 MG tablet Take 1 tablet by mouth daily 90 tablet 3    levocetirizine (XYZAL) 5 MG tablet Take 1 tablet by mouth nightly 30 tablet 5    triamcinolone (NASACORT) 55 MCG/ACT nasal inhaler 1 spray by Nasal route 2 times daily 16.9 g 3    dicyclomine (BENTYL) 10 MG capsule Take 1 capsule by mouth 4 times daily 120 capsule 3    hydrochlorothiazide (HYDRODIURIL) 25 MG tablet Take 1 tablet by mouth daily 90 tablet 3    esomeprazole (NEXIUM) 40 MG delayed release capsule TAKE 1 CAPSULE BY MOUTH IN THE MORNING BEFORE  BREAKFAST 90 capsule 1    FLUoxetine (PROZAC) 20 MG capsule TAKE 1 CAPSULE BY MOUTH ONCE DAILY 60 capsule 3    montelukast (SINGULAIR) 10 MG tablet TAKE 1 TABLET BY MOUTH ONCE DAILY 180 tablet 2     No current facility-administered medications on file prior to visit. Review of Systems   Constitutional: Negative for chills, diaphoresis, fatigue and fever. HENT: Negative for congestion, ear discharge, ear pain, sore throat, trouble swallowing and voice change. Respiratory: Negative for cough, shortness of breath and wheezing. Cardiovascular: Negative for chest pain. Gastrointestinal: Negative for abdominal pain, diarrhea, nausea and vomiting. Endocrine: Negative for cold intolerance and heat intolerance. Genitourinary: Negative for dysuria and frequency. Musculoskeletal: Positive for back pain. Negative for arthralgias, gait problem and joint swelling. Neurological: Negative for dizziness and light-headedness. Psychiatric/Behavioral: Positive for sleep disturbance. Negative for decreased concentration, dysphoric mood and suicidal ideas. The patient is not nervous/anxious.       Objective:   /64   Pulse 72   Temp

## 2020-02-15 ASSESSMENT — ENCOUNTER SYMPTOMS
ABDOMINAL PAIN: 0
TROUBLE SWALLOWING: 0
WHEEZING: 0
SORE THROAT: 0
COUGH: 0
SHORTNESS OF BREATH: 0
VOMITING: 0
NAUSEA: 0
VOICE CHANGE: 0
BACK PAIN: 1
DIARRHEA: 0

## 2020-02-26 ENCOUNTER — PATIENT MESSAGE (OUTPATIENT)
Dept: FAMILY MEDICINE CLINIC | Age: 65
End: 2020-02-26

## 2020-03-13 ENCOUNTER — OFFICE VISIT (OUTPATIENT)
Dept: FAMILY MEDICINE CLINIC | Age: 65
End: 2020-03-13
Payer: COMMERCIAL

## 2020-03-13 VITALS
DIASTOLIC BLOOD PRESSURE: 78 MMHG | SYSTOLIC BLOOD PRESSURE: 122 MMHG | HEART RATE: 83 BPM | HEIGHT: 63 IN | RESPIRATION RATE: 12 BRPM | TEMPERATURE: 97.1 F | OXYGEN SATURATION: 98 % | WEIGHT: 182 LBS | BODY MASS INDEX: 32.25 KG/M2

## 2020-03-13 PROCEDURE — 99213 OFFICE O/P EST LOW 20 MIN: CPT | Performed by: INTERNAL MEDICINE

## 2020-03-13 RX ORDER — MOMETASONE FUROATE 50 UG/1
SPRAY, METERED NASAL
COMMUNITY
Start: 2020-03-10

## 2020-03-13 RX ORDER — PREDNISONE 50 MG/1
TABLET ORAL
COMMUNITY
Start: 2020-03-04 | End: 2020-06-16 | Stop reason: ALTCHOICE

## 2020-03-13 RX ORDER — CYCLOBENZAPRINE HCL 10 MG
TABLET ORAL
COMMUNITY
Start: 2020-03-04 | End: 2020-06-16 | Stop reason: ALTCHOICE

## 2020-03-13 NOTE — PROGRESS NOTES
50 MG tablet TAKE 1 TABLET BY MOUTH ONCE DAILY      naproxen (NAPROSYN) 500 MG tablet Take 1 tablet by mouth 2 times daily as needed for Pain 60 tablet 0    FLUoxetine (PROZAC) 10 MG capsule Take 1 capsule by mouth daily Take in addition to 20mg prozac to make a daily total of 30mg 30 capsule 3    rosuvastatin (CRESTOR) 40 MG tablet Take 1 tablet by mouth daily 90 tablet 3    levocetirizine (XYZAL) 5 MG tablet Take 1 tablet by mouth nightly 30 tablet 5    triamcinolone (NASACORT) 55 MCG/ACT nasal inhaler 1 spray by Nasal route 2 times daily 16.9 g 3    dicyclomine (BENTYL) 10 MG capsule Take 1 capsule by mouth 4 times daily 120 capsule 3    hydrochlorothiazide (HYDRODIURIL) 25 MG tablet Take 1 tablet by mouth daily 90 tablet 3    esomeprazole (NEXIUM) 40 MG delayed release capsule TAKE 1 CAPSULE BY MOUTH IN THE MORNING BEFORE  BREAKFAST 90 capsule 1    FLUoxetine (PROZAC) 20 MG capsule TAKE 1 CAPSULE BY MOUTH ONCE DAILY 60 capsule 3    montelukast (SINGULAIR) 10 MG tablet TAKE 1 TABLET BY MOUTH ONCE DAILY 180 tablet 2     No current facility-administered medications on file prior to visit. Review of Systems   Constitutional: Negative for chills, diaphoresis, fatigue and fever. HENT: Positive for sore throat. Negative for congestion, ear discharge, ear pain, rhinorrhea, sinus pressure, sinus pain and sneezing. Respiratory: Negative for cough, shortness of breath and wheezing. Cardiovascular: Negative for chest pain. Gastrointestinal: Negative for abdominal pain, diarrhea, nausea and vomiting. Endocrine: Negative for cold intolerance and heat intolerance. Genitourinary: Negative for dysuria and frequency. Neurological: Negative for dizziness and light-headedness.      Objective:   /78   Pulse 83   Temp 97.1 °F (36.2 °C) (Temporal)   Resp 12   Ht 5' 3\" (1.6 m)   Wt 182 lb (82.6 kg)   LMP 01/15/2006   SpO2 98%   BMI 32.24 kg/m²     Physical Exam  Constitutional:       General:

## 2020-03-14 ASSESSMENT — ENCOUNTER SYMPTOMS
WHEEZING: 0
SINUS PRESSURE: 0
NAUSEA: 0
DIARRHEA: 0
SORE THROAT: 1
VOMITING: 0
SHORTNESS OF BREATH: 0
ABDOMINAL PAIN: 0
COUGH: 0
SINUS PAIN: 0
RHINORRHEA: 0

## 2020-04-02 RX ORDER — AMOXICILLIN AND CLAVULANATE POTASSIUM 875; 125 MG/1; MG/1
1 TABLET, FILM COATED ORAL 2 TIMES DAILY
Qty: 10 TABLET | Refills: 0 | Status: SHIPPED | OUTPATIENT
Start: 2020-04-02 | End: 2020-04-07

## 2020-04-14 RX ORDER — ESOMEPRAZOLE MAGNESIUM 40 MG/1
CAPSULE, DELAYED RELEASE ORAL
Qty: 90 CAPSULE | Refills: 0 | Status: SHIPPED | OUTPATIENT
Start: 2020-04-14 | End: 2020-07-20

## 2020-05-01 ENCOUNTER — PATIENT MESSAGE (OUTPATIENT)
Dept: FAMILY MEDICINE CLINIC | Age: 65
End: 2020-05-01

## 2020-05-01 RX ORDER — ESOMEPRAZOLE MAGNESIUM 40 MG/1
CAPSULE, DELAYED RELEASE ORAL
Qty: 90 CAPSULE | Refills: 0 | OUTPATIENT
Start: 2020-05-01

## 2020-06-16 ENCOUNTER — OFFICE VISIT (OUTPATIENT)
Dept: PRIMARY CARE CLINIC | Age: 65
End: 2020-06-16
Payer: COMMERCIAL

## 2020-06-16 VITALS
HEART RATE: 67 BPM | HEIGHT: 63 IN | WEIGHT: 168.8 LBS | DIASTOLIC BLOOD PRESSURE: 78 MMHG | SYSTOLIC BLOOD PRESSURE: 118 MMHG | RESPIRATION RATE: 14 BRPM | OXYGEN SATURATION: 98 % | TEMPERATURE: 97.6 F | BODY MASS INDEX: 29.91 KG/M2

## 2020-06-16 DIAGNOSIS — R35.0 FREQUENCY OF URINATION: ICD-10-CM

## 2020-06-16 LAB
BILIRUBIN URINE: NEGATIVE
BILIRUBIN, POC: NORMAL
BLOOD URINE, POC: NORMAL
BLOOD, URINE: NEGATIVE
CLARITY, POC: CLEAR
CLARITY: CLEAR
COLOR, POC: YELLOW
COLOR: YELLOW
GLUCOSE URINE, POC: NORMAL
GLUCOSE URINE: NEGATIVE MG/DL
KETONES, POC: NORMAL
KETONES, URINE: NEGATIVE MG/DL
LEUKOCYTE EST, POC: NORMAL
LEUKOCYTE ESTERASE, URINE: NEGATIVE
NITRITE, POC: NORMAL
NITRITE, URINE: NEGATIVE
PH UA: 6 (ref 5–9)
PH, POC: 6
PROTEIN UA: NEGATIVE MG/DL
PROTEIN, POC: NORMAL
SPECIFIC GRAVITY UA: 1.01 (ref 1–1.03)
SPECIFIC GRAVITY, POC: 1.01
UROBILINOGEN, POC: 3.5
UROBILINOGEN, URINE: 0.2 E.U./DL

## 2020-06-16 PROCEDURE — 99213 OFFICE O/P EST LOW 20 MIN: CPT | Performed by: INTERNAL MEDICINE

## 2020-06-16 PROCEDURE — 99396 PREV VISIT EST AGE 40-64: CPT | Performed by: INTERNAL MEDICINE

## 2020-06-16 PROCEDURE — 81003 URINALYSIS AUTO W/O SCOPE: CPT | Performed by: INTERNAL MEDICINE

## 2020-06-16 RX ORDER — ZOSTER VACCINE RECOMBINANT, ADJUVANTED 50 MCG/0.5
0.5 KIT INTRAMUSCULAR SEE ADMIN INSTRUCTIONS
Qty: 0.5 ML | Refills: 0 | Status: SHIPPED | OUTPATIENT
Start: 2020-06-16 | End: 2020-11-25 | Stop reason: ALTCHOICE

## 2020-06-16 ASSESSMENT — PATIENT HEALTH QUESTIONNAIRE - PHQ9
SUM OF ALL RESPONSES TO PHQ QUESTIONS 1-9: 0
2. FEELING DOWN, DEPRESSED OR HOPELESS: 0
1. LITTLE INTEREST OR PLEASURE IN DOING THINGS: 0
SUM OF ALL RESPONSES TO PHQ9 QUESTIONS 1 & 2: 0
SUM OF ALL RESPONSES TO PHQ QUESTIONS 1-9: 0

## 2020-06-16 NOTE — PROGRESS NOTES
Inability: Not on file    Transportation needs     Medical: Not on file     Non-medical: Not on file   Tobacco Use    Smoking status: Former Smoker     Packs/day: 2.00     Years: 22.00     Pack years: 44.00     Start date:      Last attempt to quit: 1993     Years since quittin.4    Smokeless tobacco: Never Used   Substance and Sexual Activity    Alcohol use: Yes     Alcohol/week: 0.0 standard drinks     Comment: occ.     Drug use: No    Sexual activity: Yes     Partners: Male   Lifestyle    Physical activity     Days per week: Not on file     Minutes per session: Not on file    Stress: Not on file   Relationships    Social connections     Talks on phone: Not on file     Gets together: Not on file     Attends Mandaen service: Not on file     Active member of club or organization: Not on file     Attends meetings of clubs or organizations: Not on file     Relationship status: Not on file    Intimate partner violence     Fear of current or ex partner: Not on file     Emotionally abused: Not on file     Physically abused: Not on file     Forced sexual activity: Not on file   Other Topics Concern    Not on file   Social History Narrative    Not on file     Family History   Problem Relation Age of Onset    High Blood Pressure Mother     High Cholesterol Mother     High Blood Pressure Father     High Cholesterol Father     No Known Problems Daughter     No Known Problems Son      Allergies:  Glycopyrrolate; Carvedilol; Remeron [mirtazapine]; and Zithromax [azithromycin]  Patient Active Problem List   Diagnosis    Anxiety    Depression    Primary osteoarthritis involving multiple joints    Mixed hyperlipidemia    Essential hypertension    Seasonal allergies    Kidney stones    Appendicitis    Palpitations    Spondylosis of lumbar region without myelopathy or radiculopathy    Sacroiliitis ([facility] [address]    DDD (degenerative disc disease), lumbar    Cervical spondylosis without myelopathy cold intolerance, heat intolerance, polydipsia and polyuria. Genitourinary: Positive for difficulty urinating, flank pain, frequency and urgency. Negative for decreased urine volume, dysuria and hematuria. Musculoskeletal: Negative for arthralgias, back pain and joint swelling. Skin: Negative for color change. Allergic/Immunologic: Negative for environmental allergies and food allergies. Neurological: Negative for dizziness, seizures, syncope, weakness, light-headedness, numbness and headaches. Psychiatric/Behavioral: Negative for agitation, decreased concentration, dysphoric mood and hallucinations. The patient is not nervous/anxious. Objective:   /78 (Site: Right Upper Arm, Position: Sitting, Cuff Size: Medium Adult)   Pulse 67   Temp 97.6 °F (36.4 °C) (Temporal)   Resp 14   Ht 5' 3\" (1.6 m)   Wt 168 lb 12.8 oz (76.6 kg)   LMP 01/15/2006   SpO2 98%   BMI 29.90 kg/m²     Physical Exam  Vitals signs reviewed. Constitutional:       General: She is not in acute distress. Appearance: Normal appearance. She is well-developed. She is not diaphoretic. HENT:      Head: Normocephalic and atraumatic. Right Ear: External ear normal.      Left Ear: External ear normal.      Nose: Nose normal.      Mouth/Throat:      Pharynx: No oropharyngeal exudate. Eyes:      General: No scleral icterus. Right eye: No discharge. Left eye: No discharge. Conjunctiva/sclera: Conjunctivae normal.      Pupils: Pupils are equal, round, and reactive to light. Neck:      Thyroid: No thyromegaly. Vascular: No JVD. Cardiovascular:      Rate and Rhythm: Normal rate and regular rhythm. Heart sounds: Normal heart sounds. No murmur. No friction rub. No gallop. Pulmonary:      Effort: Pulmonary effort is normal. No respiratory distress. Breath sounds: Normal breath sounds. No stridor. No wheezing or rales. Chest:      Chest wall: No tenderness.    Abdominal: General: Abdomen is flat. Bowel sounds are normal. There is no distension. Palpations: Abdomen is soft. There is no mass. Tenderness: There is no abdominal tenderness. There is no right CVA tenderness, left CVA tenderness or rebound. Musculoskeletal: Normal range of motion. General: No tenderness or deformity. Lymphadenopathy:      Cervical: No cervical adenopathy. Skin:     Findings: No erythema or rash. Neurological:      Mental Status: She is alert and oriented to person, place, and time. Cranial Nerves: No cranial nerve deficit. Motor: No abnormal muscle tone. Coordination: Coordination normal.      Deep Tendon Reflexes: Reflexes are normal and symmetric. Reflexes normal.   Psychiatric:         Behavior: Behavior normal.       Assessment:       Diagnosis Orders   1. Annual physical exam  CBC Auto Differential    Comprehensive Metabolic Panel    TSH Without Reflex    Hemoglobin A1C    Lipid Panel    zoster recombinant adjuvanted vaccine (SHINGRIX) 50 MCG/0.5ML SUSR injection   2. Frequency of urination  POCT Urinalysis No Micro (Auto)    URINALYSIS, MICRO    Urinalysis    Culture, Urine   3. Screening for breast cancer  SON DIGITAL SCREEN W CAD BILATERAL   4. Screening for colon cancer  Cologuard (For External Results Only)     Plan:      Orders Placed This Encounter   Procedures    Cologuard (For External Results Only)     This test is performed by an external laboratory and is used for result attachment only. It is required that this order requisition be faxed to: [facility] @ [phone]. See [email or web address] for further information. Standing Status:   Future     Standing Expiration Date:   6/16/2021    Culture, Urine     Standing Status:   Future     Number of Occurrences:   1     Standing Expiration Date:   6/16/2021     Order Specific Question:   Specify (ex-cath, midstream, cysto, etc)?      Answer:   mid stream    SON DIGITAL SCREEN W CAD

## 2020-06-17 ASSESSMENT — ENCOUNTER SYMPTOMS
ABDOMINAL DISTENTION: 0
TROUBLE SWALLOWING: 0
BLOOD IN STOOL: 0
EYE REDNESS: 0
COLOR CHANGE: 0
SINUS PRESSURE: 0
NAUSEA: 0
BACK PAIN: 0
EYE DISCHARGE: 0
SHORTNESS OF BREATH: 0
VOICE CHANGE: 0
CONSTIPATION: 0
WHEEZING: 0
COUGH: 0
RHINORRHEA: 0
ABDOMINAL PAIN: 0
PHOTOPHOBIA: 0
SINUS PAIN: 0
EYE ITCHING: 0
SORE THROAT: 0
DIARRHEA: 0
EYE PAIN: 0
VOMITING: 0

## 2020-06-18 DIAGNOSIS — Z00.00 ANNUAL PHYSICAL EXAM: ICD-10-CM

## 2020-06-18 LAB
ALBUMIN SERPL-MCNC: 4.4 G/DL (ref 3.5–4.6)
ALP BLD-CCNC: 45 U/L (ref 40–130)
ALT SERPL-CCNC: 36 U/L (ref 0–33)
ANION GAP SERPL CALCULATED.3IONS-SCNC: 13 MEQ/L (ref 9–15)
AST SERPL-CCNC: 32 U/L (ref 0–35)
BASOPHILS ABSOLUTE: 0.1 K/UL (ref 0–0.2)
BASOPHILS RELATIVE PERCENT: 2.1 %
BILIRUB SERPL-MCNC: 1 MG/DL (ref 0.2–0.7)
BUN BLDV-MCNC: 17 MG/DL (ref 8–23)
CALCIUM SERPL-MCNC: 9 MG/DL (ref 8.5–9.9)
CHLORIDE BLD-SCNC: 102 MEQ/L (ref 95–107)
CHOLESTEROL, TOTAL: 137 MG/DL (ref 0–199)
CO2: 24 MEQ/L (ref 20–31)
CREAT SERPL-MCNC: 0.71 MG/DL (ref 0.5–0.9)
EOSINOPHILS ABSOLUTE: 0.2 K/UL (ref 0–0.7)
EOSINOPHILS RELATIVE PERCENT: 4.1 %
GFR AFRICAN AMERICAN: >60
GFR NON-AFRICAN AMERICAN: >60
GLOBULIN: 2.3 G/DL (ref 2.3–3.5)
GLUCOSE BLD-MCNC: 71 MG/DL (ref 70–99)
HBA1C MFR BLD: 5.2 % (ref 4.8–5.9)
HCT VFR BLD CALC: 38.4 % (ref 37–47)
HDLC SERPL-MCNC: 62 MG/DL (ref 40–59)
HEMOGLOBIN: 13 G/DL (ref 12–16)
LDL CHOLESTEROL CALCULATED: 63 MG/DL (ref 0–129)
LYMPHOCYTES ABSOLUTE: 1.6 K/UL (ref 1–4.8)
LYMPHOCYTES RELATIVE PERCENT: 35.6 %
MCH RBC QN AUTO: 32.5 PG (ref 27–31.3)
MCHC RBC AUTO-ENTMCNC: 33.8 % (ref 33–37)
MCV RBC AUTO: 96.1 FL (ref 82–100)
MONOCYTES ABSOLUTE: 0.4 K/UL (ref 0.2–0.8)
MONOCYTES RELATIVE PERCENT: 9.4 %
NEUTROPHILS ABSOLUTE: 2.2 K/UL (ref 1.4–6.5)
NEUTROPHILS RELATIVE PERCENT: 48.8 %
PDW BLD-RTO: 13.2 % (ref 11.5–14.5)
PLATELET # BLD: 230 K/UL (ref 130–400)
POTASSIUM SERPL-SCNC: 3.4 MEQ/L (ref 3.4–4.9)
RBC # BLD: 4 M/UL (ref 4.2–5.4)
SODIUM BLD-SCNC: 139 MEQ/L (ref 135–144)
TOTAL PROTEIN: 6.7 G/DL (ref 6.3–8)
TRIGL SERPL-MCNC: 59 MG/DL (ref 0–150)
TSH SERPL DL<=0.05 MIU/L-ACNC: 1.52 UIU/ML (ref 0.44–3.86)
URINE CULTURE, ROUTINE: NORMAL
WBC # BLD: 4.4 K/UL (ref 4.8–10.8)

## 2020-06-22 ENCOUNTER — TELEPHONE (OUTPATIENT)
Dept: NEUROLOGY | Age: 65
End: 2020-06-22

## 2020-06-29 ENCOUNTER — HOSPITAL ENCOUNTER (OUTPATIENT)
Dept: WOMENS IMAGING | Age: 65
Discharge: HOME | End: 2020-07-01
Payer: COMMERCIAL

## 2020-06-29 PROCEDURE — 77063 BREAST TOMOSYNTHESIS BI: CPT

## 2020-07-20 RX ORDER — ESOMEPRAZOLE MAGNESIUM 40 MG/1
CAPSULE, DELAYED RELEASE ORAL
Qty: 90 CAPSULE | Refills: 0 | Status: SHIPPED | OUTPATIENT
Start: 2020-07-20 | End: 2020-11-25

## 2020-07-20 NOTE — TELEPHONE ENCOUNTER
requesting medication refill.  Please approve or deny this request.    Rx requested:  Requested Prescriptions     Pending Prescriptions Disp Refills    esomeprazole ([employer]) 40 MG delayed release capsule [Pharmacy Med Name: Esomeprazole Magnesium 40 MG Oral Capsule Delayed Release] 90 capsule 0     Sig: TAKE 1 CAPSULE BY MOUTH ONCE DAILY IN THE MORNING WITH BREAKFAST         Last Office Visit:   6/16/2020      Next Visit Date:  Future Appointments   Date Time Provider Sudhakar Fernandez   12/15/2020 11:00 AM MD Sudhakar Cortez

## 2020-08-08 ENCOUNTER — TELEPHONE (OUTPATIENT)
Dept: FAMILY MEDICINE CLINIC | Age: 65
End: 2020-08-08

## 2020-10-07 ENCOUNTER — OFFICE VISIT (OUTPATIENT)
Dept: PRIMARY CARE CLINIC | Age: 65
End: 2020-10-07
Payer: COMMERCIAL

## 2020-10-07 ENCOUNTER — TELEPHONE (OUTPATIENT)
Dept: PRIMARY CARE CLINIC | Age: 65
End: 2020-10-07

## 2020-10-07 VITALS
BODY MASS INDEX: 29.84 KG/M2 | TEMPERATURE: 97.9 F | HEIGHT: 63 IN | DIASTOLIC BLOOD PRESSURE: 78 MMHG | RESPIRATION RATE: 14 BRPM | WEIGHT: 168.4 LBS | SYSTOLIC BLOOD PRESSURE: 112 MMHG | HEART RATE: 81 BPM | OXYGEN SATURATION: 97 %

## 2020-10-07 DIAGNOSIS — R41.3 MEMORY DEFICITS: ICD-10-CM

## 2020-10-07 DIAGNOSIS — R35.0 FREQUENCY OF URINATION: ICD-10-CM

## 2020-10-07 LAB
BILIRUBIN URINE: NEGATIVE
BLOOD, URINE: NEGATIVE
CLARITY: CLEAR
COLOR: YELLOW
GLUCOSE URINE: NEGATIVE MG/DL
KETONES, URINE: NEGATIVE MG/DL
LEUKOCYTE ESTERASE, URINE: NEGATIVE
NITRITE, URINE: NEGATIVE
PH UA: 5.5 (ref 5–9)
PROTEIN UA: NEGATIVE MG/DL
SPECIFIC GRAVITY UA: 1.01 (ref 1–1.03)
UROBILINOGEN, URINE: 0.2 E.U./DL
VITAMIN B-12: 424 PG/ML (ref 232–1245)

## 2020-10-07 PROCEDURE — 99215 OFFICE O/P EST HI 40 MIN: CPT | Performed by: INTERNAL MEDICINE

## 2020-10-07 RX ORDER — PANTOPRAZOLE SODIUM 40 MG/1
40 TABLET, DELAYED RELEASE ORAL DAILY
Qty: 60 TABLET | Refills: 3 | Status: SHIPPED | OUTPATIENT
Start: 2020-10-07 | End: 2020-11-25

## 2020-10-07 NOTE — PROGRESS NOTES
Subjective:      Patient ID: Rafael Cuenca is a 60 y.o. female  Memory loss x 3 months  Epigastric abd pain x 1 week  HPI   Pt presents with 3 months of memory loss. Forgets to feed dogs and leaves stove and candle on overnight. Still drives herself. Depression and anxiety are well controlled on Prozac 30mg. Difficulty falling asleep or staying asleep. No personal issues. Attests to snoring, but no breathing cessation. STOP BAN  Hx STD(name unsure) in her 25s. TSH normal, no hx stroke. No known B12 def. Epigastric abd pain x 1 week  Pain is burning, rated 3/10, non radiating, relieved with heating pad. No use of NSAIDs, no bloody or black stools. Assoc bloating and belching. Urinary frequency x 2 weeks  Frequent urination in small amounts, assoc urgency. NO painful or bloody urination. Hx recurrent UTI wirh PVR 49ml. Past Medical History:   Diagnosis Date    Anxiety     Appendicitis     Chronic sinusitis 2017    Depression     DNS (deviated nasal septum) 2017    Hyperlipidemia     Hypertension     Hypertrophy of nasal turbinates 2017    Kidney stones     Osteoarthritis     Seasonal allergies      Past Surgical History:   Procedure Laterality Date    COSMETIC SURGERY      KIDNEY STONE SURGERY      LUMBAR LAMINECTOMY      ROTATOR CUFF REPAIR  2016    DR. DUNCAN    SINUS ENDOSCOPY N/A 8/10/2017    SEPTOPLASTY MICRODEBRIDER ASSISTED TURBINOPLASTY AND OUT-FRACTURING BILATERAL MAXILLARY BALLOON SINUPLASTY RIGHT FRONTAL BALLOON SINUPLASTY INTRANASAL ETHMOIDECTOMY  performed by Rob Gonzalez [facility]      TUBAL LIGATION       Social History     Socioeconomic History    Marital status:      Spouse name: Not on file    Number of children: Not on file    Years of education: Not on file    Highest education level: Not on file   Occupational History    Not on file   Social Needs    Financial resource strain: Not on file  Food insecurity     Worry: Not on file     Inability: Not on file    Transportation needs     Medical: Not on file     Non-medical: Not on file   Tobacco Use    Smoking status: Former Smoker     Packs/day: 2.00     Years: 22.00     Pack years: 44.00     Start date:      Last attempt to quit: 1993     Years since quittin.7    Smokeless tobacco: Never Used   Substance and Sexual Activity    Alcohol use: Yes     Alcohol/week: 0.0 standard drinks     Comment: occ.     Drug use: No    Sexual activity: Yes     Partners: Male   Lifestyle    Physical activity     Days per week: Not on file     Minutes per session: Not on file    Stress: Not on file   Relationships    Social connections     Talks on phone: Not on file     Gets together: Not on file     Attends Alevism service: Not on file     Active member of club or organization: Not on file     Attends meetings of clubs or organizations: Not on file     Relationship status: Not on file    Intimate partner violence     Fear of current or ex partner: Not on file     Emotionally abused: Not on file     Physically abused: Not on file     Forced sexual activity: Not on file   Other Topics Concern    Not on file   Social History Narrative    Not on file     Family History   Problem Relation Age of Onset    High Blood Pressure Mother     High Cholesterol Mother     High Blood Pressure Father     High Cholesterol Father     No Known Problems Daughter     No Known Problems Son      Allergies:  Glycopyrrolate; Carvedilol; Remeron [mirtazapine]; and Zithromax [azithromycin]  Patient Active Problem List   Diagnosis    Anxiety    Depression    Primary osteoarthritis involving multiple joints    Mixed hyperlipidemia    Essential hypertension    Seasonal allergies    Kidney stones    Appendicitis    Palpitations    Spondylosis of lumbar region without myelopathy or radiculopathy    [facility]roiliitis ([facility] [address]    DDD (degenerative disc disease), lumbar    Cervical spondylosis without myelopathy    Chronic sinusitis    DNS (deviated nasal septum)    Hypertrophy of nasal turbinates    Gastroesophageal reflux disease without esophagitis    Left foot pain     Current Outpatient Medications on File Prior to Visit   Medication Sig Dispense Refill    esomeprazole (NEXIUM) 40 MG delayed release capsule TAKE 1 CAPSULE BY MOUTH ONCE DAILY IN THE MORNING WITH BREAKFAST 90 capsule 0    zoster recombinant adjuvanted vaccine (SHINGRIX) 50 MCG/0.5ML SUSR injection Inject 0.5 mLs into the muscle See Admin Instructions 1 dose now and repeat in 2-6 months 0.5 mL 0    montelukast (SINGULAIR) 10 MG tablet Take 1 tablet by mouth once daily 90 tablet 3    FLUoxetine (PROZAC) 10 MG capsule TAKE 1 CAPSULE BY MOUTH ONCE DAILY ,  TAKE  IN  ADDITION  TO  20  MG  PROZAC  TO  MAKE  A  DAILY  TOTAL  OF  30MG 90 capsule 2    FLUoxetine (PROZAC) 20 MG capsule Take 1 capsule by mouth once daily 90 capsule 3    mometasone (NASONEX) 50 MCG/ACT nasal spray USE 1 SPRAY(S) IN EACH NOSTRIL TWICE DAILY      rosuvastatin (CRESTOR) 40 MG tablet Take 1 tablet by mouth daily 90 tablet 3    levocetirizine (XYZAL) 5 MG tablet Take 1 tablet by mouth nightly 30 tablet 5    dicyclomine (BENTYL) 10 MG capsule Take 1 capsule by mouth 4 times daily 120 capsule 3    hydrochlorothiazide (HYDRODIURIL) 25 MG tablet Take 1 tablet by mouth daily 90 tablet 3     No current facility-administered medications on file prior to visit. Review of Systems   Constitutional: Negative for chills, diaphoresis, fatigue and fever. HENT: Negative for congestion, ear discharge and ear pain. Respiratory: Negative for cough, shortness of breath and wheezing. Cardiovascular: Negative for chest pain. Gastrointestinal: Negative for abdominal pain, diarrhea, nausea and vomiting. Endocrine: Negative for cold intolerance and heat intolerance. Genitourinary: Negative for dysuria and frequency. Neurological: Negative for dizziness and light-headedness. Psychiatric/Behavioral: Negative for dysphoric mood. The patient is not nervous/anxious. Objective:   /78   Pulse 81   Temp 97.9 °F (36.6 °C)   Resp 14   Ht 5' 3\" (1.6 m)   Wt 168 lb 6.4 oz (76.4 kg)   LMP 01/15/2006   SpO2 97%   BMI 29.83 kg/m²     Physical Exam  Constitutional:       General: She is not in acute distress. Appearance: Normal appearance. She is well-developed. Cardiovascular:      Rate and Rhythm: Normal rate and regular rhythm. Pulses: Normal pulses. Heart sounds: Normal heart sounds. Pulmonary:      Effort: Pulmonary effort is normal. No respiratory distress. Breath sounds: Normal breath sounds. Abdominal:      General: Bowel sounds are normal. There is no distension. Palpations: Abdomen is soft. There is no mass. Tenderness: There is no abdominal tenderness. There is no guarding or rebound. Neurological:      General: No focal deficit present. Mental Status: She is alert and oriented to person, place, and time. Cranial Nerves: No cranial nerve deficit. Motor: No weakness. Coordination: Coordination normal.     MMSE: 29  Assessment:       Diagnosis Orders   1. Memory deficits  Rpr    Vitamin B12   2. Epigastric abdominal pain  pantoprazole (PROTONIX) 40 MG tablet   3. Frequency of urination  Urinalysis    URINALYSIS, MICRO   4.  At risk for sleep apnea  Baseline Diagnostic Sleep Study       Plan:      Orders Placed This Encounter   Procedures    Urinalysis     Standing Status:   Future     Number of Occurrences:   1     Standing Expiration Date:   10/7/2021    URINALYSIS, MICRO     Standing Status:   Future     Number of Occurrences:   1     Standing Expiration Date:   10/7/2021    Rpr     Standing Status:   Future     Number of Occurrences:   1     Standing Expiration Date:   10/7/2021    Vitamin B12     Standing Status:   Future     Number of Occurrences: 1     Standing Expiration Date:   10/7/2021    Baseline Diagnostic Sleep Study     Patients with abnormal results will be assessed and referred to the sleep  as needed. Standing Status:   Future     Standing Expiration Date:   4/7/2022     Scheduling Instructions:      Scheduling and Pre-Certification: [phone]      The following must be completed and FAXED to [phone]      1) Sleep Evaluation Orders Form      2) Office note justifying study      3) Demographic info / insurance card     Order Specific Question:   Adult or Pediatric     Answer:   Adult Study (>7 Years)     Order Specific Question:   Location For Sleep Study     Answer:   [place]     Order Specific Question:   Select Sleep Lab Location     Answer:   [facility]     Orders Placed This Encounter   Medications    pantoprazole (PROTONIX) 40 MG tablet     Sig: Take 1 tablet by mouth daily     Dispense:  60 tablet     Refill:  3     DC Nexium pls     Return in about 1 month (around 11/7/2020) for assessment of response to treatment, follow up.

## 2020-10-07 NOTE — TELEPHONE ENCOUNTER
Contacted [facility] and they will be sending Cologuard results today and stated if we do not receive it through fax by tomorrow give them a call back.

## 2020-10-08 LAB — RPR: NORMAL

## 2020-10-08 ASSESSMENT — ENCOUNTER SYMPTOMS
DIARRHEA: 0
WHEEZING: 0
SHORTNESS OF BREATH: 0
ABDOMINAL PAIN: 0
COUGH: 0
VOMITING: 0
NAUSEA: 0

## 2020-10-08 NOTE — TELEPHONE ENCOUNTER
1st attempt to reach patient. Called patient @[phone] and left message on machine for patient to return call during normal business hours of 8:30 AM and 5 PM @ [phone] option 2.

## 2020-10-09 NOTE — TELEPHONE ENCOUNTER
2nd attempt to reach patient. Called patient @ [phone] and left message on machine for patient to return call during normal business hours of 8:30 AM and 5 PM @ [phone] option 2.

## 2020-10-12 NOTE — TELEPHONE ENCOUNTER
3rd attempt to reach patient. Called patient @ [phone] and left message on machine regarding normal cologuard results and advised pt to call back with any questions or concerns.

## 2020-10-19 NOTE — TELEPHONE ENCOUNTER
Pharmacy requesting medication refill.  Please approve or deny this request.    Rx requested:  Requested Prescriptions     Pending Prescriptions Disp Refills    hydroCHLOROthiazide (HYDRODIURIL) 25 MG tablet [Pharmacy Med Name: hydroCHLOROthiazide 25 MG Oral Tablet] 90 tablet 0     Sig: Take 1 tablet by mouth once daily         Last Office Visit:   10/7/2020      Next Visit Date:  Future Appointments   Date Time Provider Sudhakar Fernandez   12/15/2020 11:00 AM MD Sudhakar Reid

## 2020-10-20 RX ORDER — HYDROCHLOROTHIAZIDE 25 MG/1
TABLET ORAL
Qty: 90 TABLET | Refills: 0 | Status: SHIPPED | OUTPATIENT
Start: 2020-10-20 | End: 2021-01-11

## 2020-10-22 ENCOUNTER — TELEPHONE (OUTPATIENT)
Dept: PRIMARY CARE CLINIC | Age: 65
End: 2020-10-22

## 2020-10-26 ENCOUNTER — TELEPHONE (OUTPATIENT)
Dept: PRIMARY CARE CLINIC | Age: 65
End: 2020-10-26

## 2020-10-26 NOTE — TELEPHONE ENCOUNTER
Called and left message that results of cologard were normal, and that she should have repeat in 3 years.

## 2020-11-25 ENCOUNTER — OFFICE VISIT (OUTPATIENT)
Dept: PRIMARY CARE CLINIC | Age: 65
End: 2020-11-25
Payer: COMMERCIAL

## 2020-11-25 VITALS
HEIGHT: 63 IN | WEIGHT: 167.8 LBS | DIASTOLIC BLOOD PRESSURE: 72 MMHG | HEART RATE: 73 BPM | OXYGEN SATURATION: 99 % | SYSTOLIC BLOOD PRESSURE: 102 MMHG | BODY MASS INDEX: 29.73 KG/M2 | RESPIRATION RATE: 14 BRPM

## 2020-11-25 PROCEDURE — 99214 OFFICE O/P EST MOD 30 MIN: CPT | Performed by: INTERNAL MEDICINE

## 2020-11-25 RX ORDER — AMOXICILLIN 500 MG/1
500 CAPSULE ORAL 3 TIMES DAILY
Qty: 15 CAPSULE | Refills: 0 | Status: SHIPPED | OUTPATIENT
Start: 2020-11-25 | End: 2020-11-30

## 2020-11-25 RX ORDER — PANTOPRAZOLE SODIUM 40 MG/1
40 TABLET, DELAYED RELEASE ORAL 2 TIMES DAILY
Qty: 120 TABLET | Refills: 3 | Status: SHIPPED | OUTPATIENT
Start: 2020-11-25 | End: 2021-01-16 | Stop reason: SDUPTHER

## 2020-11-25 ASSESSMENT — ENCOUNTER SYMPTOMS
DIARRHEA: 0
ABDOMINAL PAIN: 1
SINUS PRESSURE: 1
NAUSEA: 0
SHORTNESS OF BREATH: 1
SORE THROAT: 0
WHEEZING: 0
SINUS PAIN: 0
COUGH: 1
RHINORRHEA: 1
VOMITING: 0

## 2020-11-25 ASSESSMENT — PATIENT HEALTH QUESTIONNAIRE - PHQ9
SUM OF ALL RESPONSES TO PHQ QUESTIONS 1-9: 0
2. FEELING DOWN, DEPRESSED OR HOPELESS: 0
SUM OF ALL RESPONSES TO PHQ9 QUESTIONS 1 & 2: 0
SUM OF ALL RESPONSES TO PHQ QUESTIONS 1-9: 0
SUM OF ALL RESPONSES TO PHQ QUESTIONS 1-9: 0
1. LITTLE INTEREST OR PLEASURE IN DOING THINGS: 0

## 2020-11-25 NOTE — PROGRESS NOTES
Tobacco Use    Smoking status: Former Smoker     Packs/day: 2.00     Years: 22.00     Pack years: 44.00     Start date:      Last attempt to quit: 1993     Years since quittin.9    Smokeless tobacco: Never Used   Substance and Sexual Activity    Alcohol use: Yes     Alcohol/week: 0.0 standard drinks     Comment: occ.     Drug use: No    Sexual activity: Yes     Partners: Male   Lifestyle    Physical activity     Days per week: Not on file     Minutes per session: Not on file    Stress: Not on file   Relationships    Social connections     Talks on phone: Not on file     Gets together: Not on file     Attends [employer] service: Not on file     Active member of club or organization: Not on file     Attends meetings of clubs or organizations: Not on file     Relationship status: Not on file    Intimate partner violence     Fear of current or ex partner: Not on file     Emotionally abused: Not on file     Physically abused: Not on file     Forced sexual activity: Not on file   Other Topics Concern    Not on file   Social History Narrative    Not on file     Family History   Problem Relation Age of Onset    High Blood Pressure Mother     High Cholesterol Mother     High Blood Pressure Father     High Cholesterol Father     No Known Problems Daughter     No Known Problems Son      Allergies:  Glycopyrrolate; Carvedilol; Remeron [mirtazapine]; and Zithromax [azithromycin]  Patient Active Problem List   Diagnosis    Anxiety    Depression    Primary osteoarthritis involving multiple joints    Mixed hyperlipidemia    Essential hypertension    Seasonal allergies    Kidney stones    Appendicitis    Palpitations    Spondylosis of lumbar region without myelopathy or radiculopathy    Sacroiliitis ([facility] [address]    DDD (degenerative disc disease), lumbar    Cervical spondylosis without myelopathy    Chronic sinusitis    DNS (deviated nasal septum)    Hypertrophy of nasal turbinates    Gastroesophageal reflux disease without esophagitis    Left foot pain     Current Outpatient Medications on File Prior to Visit   Medication Sig Dispense Refill    hydroCHLOROthiazide (HYDRODIURIL) 25 MG tablet Take 1 tablet by mouth once daily 90 tablet 0    montelukast (SINGULAIR) 10 MG tablet Take 1 tablet by mouth once daily 90 tablet 3    FLUoxetine (PROZAC) 10 MG capsule TAKE 1 CAPSULE BY MOUTH ONCE DAILY ,  TAKE  IN  ADDITION  TO  20  MG  PROZAC  TO  MAKE  A  DAILY  TOTAL  OF  30MG 90 capsule 2    FLUoxetine (PROZAC) 20 MG capsule Take 1 capsule by mouth once daily 90 capsule 3    mometasone (NASONEX) 50 MCG/ACT nasal spray USE 1 SPRAY(S) IN EACH NOSTRIL TWICE DAILY      rosuvastatin (CRESTOR) 40 MG tablet Take 1 tablet by mouth daily 90 tablet 3    levocetirizine (XYZAL) 5 MG tablet Take 1 tablet by mouth nightly 30 tablet 5    dicyclomine (BENTYL) 10 MG capsule Take 1 capsule by mouth 4 times daily 120 capsule 3     No current facility-administered medications on file prior to visit. Review of Systems   Constitutional: Positive for chills. Negative for diaphoresis, fatigue and fever. HENT: Positive for rhinorrhea and sinus pressure. Negative for congestion, ear discharge, ear pain, sinus pain, sneezing and sore throat. Respiratory: Positive for cough and shortness of breath. Negative for wheezing. Cardiovascular: Negative for chest pain. Gastrointestinal: Positive for abdominal pain. Negative for diarrhea, nausea and vomiting. Endocrine: Negative for cold intolerance and heat intolerance. Genitourinary: Negative for dysuria and frequency. Neurological: Negative for dizziness and light-headedness. Psychiatric/Behavioral: Negative for dysphoric mood. The patient is not nervous/anxious.       Objective:   /72 (Site: Left Upper Arm, Position: Sitting, Cuff Size: Medium Adult)   Pulse 73   Resp 14   Ht 5' 3\" (1.6 m)   Wt 167 lb 12.8 oz (76.1 kg)   LMP 01/15/2006   SpO2 99%   BMI 29.72 kg/m²     Physical Exam  Constitutional:       General: She is not in acute distress. Appearance: Normal appearance. She is well-developed. HENT:      Head:      Comments: No TM or pharyngeal erythema  No sinus TTP  Cardiovascular:      Rate and Rhythm: Normal rate and regular rhythm. Pulmonary:      Effort: Pulmonary effort is normal. No respiratory distress. Breath sounds: Normal breath sounds. No wheezing. Abdominal:      General: Bowel sounds are normal. There is no distension. Palpations: Abdomen is soft. There is no mass. Tenderness: There is no abdominal tenderness. There is no guarding or rebound. Neurological:      Mental Status: She is alert. Assessment:       Diagnosis Orders   1. Gastritis without bleeding, unspecified chronicity, unspecified gastritis type  Jesse [id number] MD, Gastroenterology, [place]    pantoprazole (PROTONIX) 40 MG tablet   2. Gastroesophageal reflux disease without esophagitis Controlled    3. Acute non-recurrent sinusitis, unspecified location  amoxicillin (AMOXIL) 500 MG capsule   4.  Mixed hyperlipidemia  Lipid Panel      Plan:      Orders Placed This Encounter   Procedures    Lipid Panel     Standing Status:   Future     Standing Expiration Date:   11/25/2021     Order Specific Question:   Is Patient Fasting?/# of Hours     Answer:   fasting    Bianca Pritchett, Gastroenterology, [place]     Referral Priority:   Routine     Referral Type:   Eval and Treat     Referral Reason:   Specialty Services Required     Referred to Provider:   Milagros Reina MD     Requested Specialty:   Gastroenterology     Number of Visits Requested:   1     Orders Placed This Encounter   Medications    pantoprazole (PROTONIX) 40 MG tablet     Sig: Take 1 tablet by mouth 2 times daily     Dispense:  120 tablet     Refill:  3     DC Nexium pls    amoxicillin (AMOXIL) 500 MG capsule     Sig: Take 1 capsule by mouth 3 times daily for 5 days     Dispense:  15 capsule     Refill:  0     Return in about 1 month (around 12/25/2020) for assessment of response to treatment.

## 2020-11-30 RX ORDER — ROSUVASTATIN CALCIUM 40 MG/1
TABLET, COATED ORAL
Qty: 90 TABLET | Refills: 0 | Status: SHIPPED | OUTPATIENT
Start: 2020-11-30 | End: 2021-03-16

## 2020-12-01 ENCOUNTER — VIRTUAL VISIT (OUTPATIENT)
Dept: GASTROENTEROLOGY | Age: 65
End: 2020-12-01
Payer: COMMERCIAL

## 2020-12-01 PROCEDURE — 99214 OFFICE O/P EST MOD 30 MIN: CPT | Performed by: NURSE PRACTITIONER

## 2020-12-01 RX ORDER — SUCRALFATE 1 G/1
1 TABLET ORAL 4 TIMES DAILY
Qty: 120 TABLET | Refills: 3 | Status: SHIPPED | OUTPATIENT
Start: 2020-12-01 | End: 2022-01-21 | Stop reason: SDUPTHER

## 2020-12-01 NOTE — PROGRESS NOTES
2020    TELEHEALTH EVALUATION -- Audio/Visual (During SXXUM-24 public health emergency)    Due to COVID 19 outbreak, patient's office visit was converted to a virtual visit. Patient was contacted and agreed to proceed with a virtual visit via Telephone Visit 20 minutes. The risks and benefits of converting to a virtual visit were discussed in light of the current infectious disease epidemic. Patient also understood that insurance coverage and co-pays are up to their individual insurance plans. HPI:  Hazel Tracy (:  1955) has requested an audio/video evaluation for the following concern(s):  Previously seen by Dr. Laxmi Yan in 2017 with complaints of left upper quadrant pain, GERD and IBS. Patient has also recently seen GI at [facility] in May, 2019. She did undergo an upper endoscopy in , records are not available. Negative Cologuard test last year. No family history of colon cancer. History of Helicobacter pylori infection. Patient with ongoing complaints of epigastric pain, belching, bloating, nausea with GERD. No dysphagia. Her symptoms come in waves, her last episode was 2 weeks ago and is worse at night. No NSAID use. Alcohol 2-3 times per week. Non-smoker. 2 cups of coffee per day. Avoids spicy and acidic foods, but eats a lot of snacks throughout the day (cookies, chocolates, chips). Pantoprazole twice daily, typically helps. No dysphagia, odynophagia or regurgitation. Patient has a bowel movement 4-5 times per week. Experiences abdominal bloating. Has to strain to have a BM. Never feels fully evacuated. Stool is like hard small balls. Will experience occasional loose stool. Takes Metamucil daily. Does not drink enough water throughout the day. No melena or hematochezia. Sedentary lifestyle. No chest pain, shortness of breath, or palpitations. No fever or chills.     Endoscopic investigations:  Cologuard - negative   EGD 2019-records requested    Review of Systems   All other systems reviewed and are negative. Prior to Visit Medications    Medication Sig Taking? Authorizing Provider   sucralfate (CARAFATE) 1 GM tablet Take 1 tablet by mouth 4 times daily Yes DAREN Leon CNP   rosuvastatin (CRESTOR) 40 MG tablet Take 1 tablet by mouth once daily  Erik Lord MD   pantoprazole (PROTONIX) 40 MG tablet Take 1 tablet by mouth 2 times daily  Erik Lord MD   hydroCHLOROthiazide (HYDRODIURIL) 25 MG tablet Take 1 tablet by mouth once daily  Erik Lord MD   montelukast (SINGULAIR) 10 MG tablet Take 1 tablet by mouth once daily  Erik Lord MD   FLUoxetine (PROZAC) 10 MG capsule TAKE 1 CAPSULE BY MOUTH ONCE DAILY ,  TAKE  IN  ADDITION  TO  20  MG  PROZAC  TO  MAKE  A  DAILY  TOTAL  OF  30MG  Erik Lord MD   FLUoxetine (PROZAC) 20 MG capsule Take 1 capsule by mouth once daily  Erik Lord MD   mometasone (NASONEX) 50 MCG/ACT nasal spray USE 1 SPRAY(S) IN EACH NOSTRIL TWICE DAILY  Historical Provider, MD   levocetirizine (XYZAL) 5 MG tablet Take 1 tablet by mouth nightly  Erik Lord MD   dicyclomine (BENTYL) 10 MG capsule Take 1 capsule by mouth 4 times daily  Erik Lord MD       Social History     Tobacco Use    Smoking status: Former Smoker     Packs/day: 2.00     Years: 22.00     Pack years: 44.00     Start date:      Last attempt to quit: 1993     Years since quittin.9    Smokeless tobacco: Never Used   Substance Use Topics    Alcohol use: Yes     Alcohol/week: 0.0 standard drinks     Comment: occ.     Drug use: No      Allergies   Allergen Reactions    Glycopyrrolate Other (See Comments)     High blood pressure and tachycardia    Carvedilol Diarrhea and Other (See Comments)     fatigue    Remeron [Mirtazapine] Other (See Comments)     sedation    Zithromax [Azithromycin] Diarrhea   ,   Past Medical History:   Diagnosis Date    Anxiety     Appendicitis     Chronic sinusitis 2017    Depression     DNS (deviated nasal septum) 2017    Hyperlipidemia     Hypertension     Hypertrophy of nasal turbinates 2017    Kidney stones     Osteoarthritis     Seasonal allergies    ,   Past Surgical History:   Procedure Laterality Date    COSMETIC SURGERY      KIDNEY STONE SURGERY      LUMBAR LAMINECTOMY      ROTATOR CUFF REPAIR  2016    DR. DUNCAN    SINUS ENDOSCOPY N/A 8/10/2017    SEPTOPLASTY MICRODEBRIDER ASSISTED TURBINOPLASTY AND OUT-FRACTURING BILATERAL MAXILLARY BALLOON SINUPLASTY RIGHT FRONTAL BALLOON SINUPLASTY INTRANASAL ETHMOIDECTOMY  performed by Rufina Galindo MD at [facility]      TUBAL LIGATION     ,   Social History     Tobacco Use    Smoking status: Former Smoker     Packs/day: 2.00     Years: 22.00     Pack years: 44.00     Start date:      Last attempt to quit: 1993     Years since quittin.9    Smokeless tobacco: Never Used   Substance Use Topics    Alcohol use: Yes     Alcohol/week: 0.0 standard drinks     Comment: occ.  Drug use: No   ,   Family History   Problem Relation Age of Onset    High Blood Pressure Mother     High Cholesterol Mother     High Blood Pressure Father     High Cholesterol Father     No Known Problems Daughter     No Known Problems Son      Due to this being a TeleHealth encounter, evaluation of the following organ systems is limited: Vitals/Constitutional/EENT/Resp/CV/GI//MS/Neuro/Skin/Heme-Lymph-Imm. ASSESSMENT/PLAN:  59-year-old female patient with a constellation of GI complaints. Recent upper endoscopy in 2019 at [facility], records requested. History of Helicobacter pylori and IBS. Patient's upper GI symptoms typically are well managed with daily PPI, but will experience waves of belching, nausea, epigastric pain and worsening GERD symptoms. EGD records from 2019 requested. Will treat the patient with diet changes, PPI and Carafate.   Defer EGD at this time; check HP stool discussed at length       Return in about 6 weeks (around 1/12/2021). An  electronic signature was used to authenticate this note. --Connie Youssef, DAREN - CNP on 12/1/2020 at 9:12 AM    119  Pursuant to the emergency declaration under the [address] [place] [address], [facility] waiver authority and the [employer] and [employer] Act, this Virtual  Visit was conducted, with patient's consent, to reduce the patient's risk of exposure to COVID-19 and provide continuity of care for an established patient. Services were provided through a video synchronous discussion virtually to substitute for in-person clinic visit.

## 2020-12-09 RX ORDER — DICYCLOMINE HYDROCHLORIDE 10 MG/1
10 CAPSULE ORAL 4 TIMES DAILY
Qty: 120 CAPSULE | Refills: 0 | Status: SHIPPED | OUTPATIENT
Start: 2020-12-09 | End: 2022-01-06 | Stop reason: ALTCHOICE

## 2020-12-10 DIAGNOSIS — R14.2 BELCHING: ICD-10-CM

## 2020-12-10 DIAGNOSIS — E78.2 MIXED HYPERLIPIDEMIA: ICD-10-CM

## 2020-12-10 DIAGNOSIS — R11.0 NAUSEA: ICD-10-CM

## 2020-12-10 DIAGNOSIS — R14.0 ABDOMINAL BLOATING: ICD-10-CM

## 2020-12-10 DIAGNOSIS — R10.13 EPIGASTRIC ABDOMINAL PAIN: ICD-10-CM

## 2020-12-10 DIAGNOSIS — Z86.19 HISTORY OF HELICOBACTER INFECTION: ICD-10-CM

## 2020-12-10 LAB
CHOLESTEROL, TOTAL: 133 MG/DL (ref 0–199)
HDLC SERPL-MCNC: 59 MG/DL (ref 40–59)
LDL CHOLESTEROL CALCULATED: 59 MG/DL (ref 0–129)
TRIGL SERPL-MCNC: 75 MG/DL (ref 0–150)

## 2020-12-12 LAB — H PYLORI ANTIGEN STOOL: NEGATIVE

## 2021-01-16 DIAGNOSIS — K29.70 GASTRITIS WITHOUT BLEEDING, UNSPECIFIED CHRONICITY, UNSPECIFIED GASTRITIS TYPE: ICD-10-CM

## 2021-01-16 RX ORDER — PANTOPRAZOLE SODIUM 40 MG/1
40 TABLET, DELAYED RELEASE ORAL 2 TIMES DAILY
Qty: 120 TABLET | Refills: 3 | Status: SHIPPED | OUTPATIENT
Start: 2021-01-16 | End: 2021-01-21 | Stop reason: SDUPTHER

## 2021-01-21 ENCOUNTER — VIRTUAL VISIT (OUTPATIENT)
Dept: GASTROENTEROLOGY | Age: 66
End: 2021-01-21
Payer: COMMERCIAL

## 2021-01-21 DIAGNOSIS — K59.00 CONSTIPATION, UNSPECIFIED CONSTIPATION TYPE: Primary | ICD-10-CM

## 2021-01-21 DIAGNOSIS — K21.9 GASTROESOPHAGEAL REFLUX DISEASE WITHOUT ESOPHAGITIS: ICD-10-CM

## 2021-01-21 PROCEDURE — 99442 PR PHYS/QHP TELEPHONE EVALUATION 11-20 MIN: CPT | Performed by: INTERNAL MEDICINE

## 2021-01-21 RX ORDER — PANTOPRAZOLE SODIUM 40 MG/1
40 TABLET, DELAYED RELEASE ORAL DAILY
Qty: 30 TABLET | Refills: 3 | Status: SHIPPED | OUTPATIENT
Start: 2021-01-21 | End: 2021-05-18

## 2021-01-21 NOTE — PROGRESS NOTES
linaCLOtide (LINZESS) 72 MCG CAPS capsule Take 1 capsule by mouth every morning (before breakfast) Yes Kirby Tillman MD   pantoprazole (PROTONIX) 40 MG tablet Take 1 tablet by mouth daily Yes Kirby Tillman MD   hydroCHLOROthiazide (HYDRODIURIL) 25 MG tablet Take 1 tablet by mouth once daily Yes Dorys Bateman MD   dicyclomine (BENTYL) 10 MG capsule Take 1 capsule by mouth 4 times daily Yes Dorys Bateman MD   sucralfate (CARAFATE) 1 GM tablet Take 1 tablet by mouth 4 times daily Yes DAREN Brenner CNP   rosuvastatin (CRESTOR) 40 MG tablet Take 1 tablet by mouth once daily Yes Dorys Bateman MD   montelukast (SINGULAIR) 10 MG tablet Take 1 tablet by mouth once daily Yes Dorys Bateman MD   FLUoxetine (PROZAC) 10 MG capsule TAKE 1 CAPSULE BY MOUTH ONCE DAILY ,  TAKE  IN  ADDITION  TO  20  MG  PROZAC  TO  MAKE  A  DAILY  TOTAL  OF  30MG Yes Dorys Bateman MD   FLUoxetine (PROZAC) 20 MG capsule Take 1 capsule by mouth once daily Yes Dorys Bateman MD   mometasone (NASONEX) 50 MCG/ACT nasal spray USE 1 SPRAY(S) IN EACH NOSTRIL TWICE DAILY Yes Historical Provider, MD   levocetirizine (XYZAL) 5 MG tablet Take 1 tablet by mouth nightly Yes Dorys Bateman MD       Social History     Tobacco Use    Smoking status: Former Smoker     Packs/day: 2.00     Years: 22.00     Pack years: 44.00     Start date:      Quit date: 1993     Years since quittin.0    Smokeless tobacco: Never Used   Substance Use Topics    Alcohol use: Yes     Alcohol/week: 0.0 standard drinks     Comment: occ.     Drug use: No      PMH, PSH, FH and allergies reviewed and updated    Limited information on physical examination: Due to this being a telehealth visit, physical examination could not be performed    Laboratory, Pathology, Radiology reviewed indetail with relevant important investigations summarized below:  Lab Results   Component Value Date    WBC 4.4 (L) 2020    HGB 13.0 2020    HCT 38.4 2020    MCV 96.1 2020     06/18/2020     Lab Results   Component Value Date    ALT 36 (H) 06/18/2020    AST 32 06/18/2020    ALKPHOS 45 06/18/2020    BILITOT 1.0 (H) 06/18/2020     Assessment and Plan:  60 y.o. female with   1. Constipation, unspecified constipation type  -Suboptimal response to MiraLAX and Metamucil  -Start LINZESS 72 MCG CAPS capsule; Take 1 capsule by mouth every morning (before breakfast)    2. Gastroesophageal reflux disease without esophagitis  -Importance of antireflux measures emphasized  -Refill provided for Protonix  - pantoprazole (PROTONIX) 40 MG tablet; Take 1 tablet by mouth daily  Dispense: 30 tablet; Refill: 3    Return in 4 to 6 weeks (around 2/25/2021). This visit was completed over telephone, with patient at their home and provider at the hospital, total time spent conversing with the patient 20 minutes, other personnel involved in the care are  staff and Medical assistant. Saturnino Zhang MD   Gastroenterology  [facility]    Pursuant to the emergency declaration under the [facility] [address] waiver authority and the Coronavirus Prepared[employer] and [employer] Act, this Virtual Visit was conducted, with patient's consent, to reduce the patient's risk of exposure to COVID-19 and provide continuity of care for an established patient. Please note this report has been partially produced using speech recognition software and may cause contain errors related to thatsystem including grammar, punctuation and spelling as well as words and phrases that may seem inappropriate. If there are questions or concerns please feel free to contact me to clarify.

## 2021-01-26 DIAGNOSIS — Z78.0 POST-MENOPAUSAL: Primary | ICD-10-CM

## 2021-03-09 ENCOUNTER — VIRTUAL VISIT (OUTPATIENT)
Dept: GASTROENTEROLOGY | Age: 66
End: 2021-03-09
Payer: COMMERCIAL

## 2021-03-09 DIAGNOSIS — R14.2 BURPING: ICD-10-CM

## 2021-03-09 DIAGNOSIS — R14.2 BELCHING: ICD-10-CM

## 2021-03-09 DIAGNOSIS — K59.00 CONSTIPATION, UNSPECIFIED CONSTIPATION TYPE: Primary | ICD-10-CM

## 2021-03-09 PROCEDURE — 99214 OFFICE O/P EST MOD 30 MIN: CPT | Performed by: INTERNAL MEDICINE

## 2021-03-09 RX ORDER — POLYETHYLENE GLYCOL 3350 17 G/17G
10 POWDER, FOR SOLUTION ORAL DAILY
Qty: 510 G | Refills: 3 | Status: SHIPPED | OUTPATIENT
Start: 2021-03-09 | End: 2021-04-08

## 2021-03-09 RX ORDER — PEPPERMINT OIL 90 MG
CAPSULE, DELAYED, AND EXTENDED RELEASE ORAL
Qty: 60 CAPSULE | Refills: 3 | Status: SHIPPED | OUTPATIENT
Start: 2021-03-09 | End: 2022-01-06

## 2021-03-09 NOTE — PROGRESS NOTES
3/9/2021    TELEHEALTH EVALUATION -- Audio/Visual (During UHBKG-93 public health emergency)    Due to [employer] 19 outbreak, patient's office visit was converted to a virtual visit. Patient was contacted and agreed to proceed with a virtual visit via [email or web address]  The risks and benefits of converting to a virtual visit were discussed in light of the current infectious disease epidemic. Patient also understood that insurance coverage and co-pays are up to their individual insurance plans. Chief Complaint   Patient presents with    Follow-up     Patient is feeling well, text [phone]. She still has occasional GERD and belching     HPI:  Patient Location: Home  Provider location: Office    Ric John (:  1955) follow-up after last visit on 2021 with constellation of GI symptoms including gastroesophageal reflux, abdominal discomfort, bloating, belching, nausea, constipation and history of Helicobacter pylori. Interval history: Patient reports improvement in symptoms, did not take Linzess as advised at last visit as he is having daily bowel movements. She does report to straining with every bowel movement. She also reports having hard pellet-like stools. Not keen on pursuing Linzess. On pantoprazole for reflux symptoms, reports good symptom control, has recurrence of symptoms if she misses a couple of doses. Continues to have symptoms of burping and belching. Not on any supplements or over-the-counter medication for the symptoms. HPI at 2021 visit:  Patient was given Carafate for her epigastric pain, Helicobacter pylori stool test was performed-negative, advised to take Metamucil and MiraLAX for constipation-patient is taking both MiraLAX and Metamucil with suboptimal response, continues to have 2-3 bowel movements a week.  Reflux symptoms well controlled with Protonix, patient requesting refill    HPI at 2020 visit:  Previously seen by Dr. Amanuel Dunbar in 2017 with complaints of left upper quadrant pain, GERD and IBS. Patient has also recently seen GI at [facility] in May, 2019. She did undergo an upper endoscopy in 2019, records are not available. Negative Cologuard test last year. No family history of colon cancer. History of Helicobacter pylori infection. Patient with ongoing complaints of epigastric pain, belching, bloating, nausea with GERD. No dysphagia. Her symptoms come in waves, her last episode was 2 weeks ago and is worse at night. No NSAID use. Alcohol 2-3 times per week. Non-smoker. 2 cups of coffee per day. Avoids spicy and acidic foods, but eats a lot of snacks throughout the day (cookies, chocolates, chips). Pantoprazole twice daily, typically helps. No dysphagia, odynophagia or regurgitation.   Patient has a bowel movement 4-5 times per week. Experiences abdominal bloating. Has to strain to have a BM. Never feels fully evacuated. Stool is like hard small balls. Will experience occasional loose stool. Takes Metamucil daily. Does not drink enough water throughout the day. No melena or hematochezia. Sedentary lifestyle. No chest pain, shortness of breath, or palpitations. No fever or chills. Previous GI work up/Endoscopic investigations:   Cologuard 2020- N normal egative   EGD 2019-records requested    Review of Systems   All other systems reviewed and are negative. Prior to Visit Medications    Medication Sig Taking?  Authorizing Provider   polyethylene glycol (MIRALAX) 17 GM/SCOOP powder Take 10 g by mouth daily Yes Jean Carlos Wyman MD   Jewels Oil-Levomenthol (FDGARD) 25-20.75 MG CAPS Take 2 capsule one to two times daily Yes Jean Carlos Wyman MD   Psyllium (METAMUCIL FIBER PO) Take by mouth Yes Historical Provider, MD   pantoprazole (PROTONIX) 40 MG tablet Take 1 tablet by mouth daily Yes Jean Carlos Wyman MD   hydroCHLOROthiazide (HYDRODIURIL) 25 MG tablet Take 1 tablet by mouth once daily Yes Kaylyn Queen MD   dicyclomine (BENTYL) 10 MG capsule having daily bowel movements albeit with straining and hard pellet-like stools on a day-to-day basis. Detailed discussion regarding importance of having normal consistency soft bowel movements on a daily basis, patient advised to avoid straining  -Currently on fiber, add - polyethylene glycol (MIRALAX) 17 GM/SCOOP powder; Take 10 g by mouth daily   2. Belching  3. Burping  - Trial of FDgard    Return in 6-8 weeks (around 4/27/2021). An electronic signature was used to authenticate this note. --Maxwell Singh MD on 3/9/2021 at 11:17 AM  Gastroenterology  [address] to the emergency declaration under the [facility] [address] waiver authority and the [employer] and [employer] Act, this Virtual Visit was conducted, with patient's consent, to reduce the patient's risk of exposure to COVID-19 and provide continuity of care for an established patient. Services were provided through a video synchronous discussion virtually to substitute for in-person clinic visit. Please note this report has been partially produced using speech recognition software and may cause contain errors related to thatsystem including grammar, punctuation and spelling as well as words and phrases that may seem inappropriate. If there are questions or concerns please feel free to contact me to clarify.

## 2021-05-13 DIAGNOSIS — R09.81 SINUS CONGESTION: Primary | ICD-10-CM

## 2021-05-13 RX ORDER — FLUTICASONE PROPIONATE 50 MCG
2 SPRAY, SUSPENSION (ML) NASAL DAILY
Qty: 2 BOTTLE | Refills: 3 | Status: SHIPPED | OUTPATIENT
Start: 2021-05-13 | End: 2022-01-06 | Stop reason: ALTCHOICE

## 2021-05-18 RX ORDER — PANTOPRAZOLE SODIUM 40 MG/1
TABLET, DELAYED RELEASE ORAL
Qty: 60 TABLET | Refills: 0 | Status: SHIPPED | OUTPATIENT
Start: 2021-05-18 | End: 2021-07-19

## 2021-05-18 NOTE — TELEPHONE ENCOUNTER
Pharmacy requesting medication refill.  Please approve or deny this request.    Rx requested:  Requested Prescriptions     Pending Prescriptions Disp Refills    pantoprazole (PROTONIX) 40 MG tablet [Pharmacy Med Name: Pantoprazole Sodium 40 MG Oral Tablet Delayed Release] 60 tablet 0     Sig: TAKE 1  BY MOUTH ONCE DAILY         Last Office Visit:   11/25/2020      Next Visit Date:  Future Appointments   Date Time Provider Sudhakar Fernandez   6/9/2021  1:30 PM Kerry Onofre MD [facility]

## 2021-05-25 DIAGNOSIS — N95.1 VASOMOTOR SYMPTOMS DUE TO MENOPAUSE: ICD-10-CM

## 2021-05-25 DIAGNOSIS — J30.9 ALLERGIC RHINITIS, UNSPECIFIED SEASONALITY, UNSPECIFIED TRIGGER: ICD-10-CM

## 2021-05-25 RX ORDER — MONTELUKAST SODIUM 10 MG/1
TABLET ORAL
Qty: 90 TABLET | Refills: 3 | Status: SHIPPED | OUTPATIENT
Start: 2021-05-25 | End: 2022-05-11

## 2021-05-25 RX ORDER — FLUOXETINE HYDROCHLORIDE 20 MG/1
CAPSULE ORAL
Qty: 60 CAPSULE | Refills: 3 | Status: SHIPPED | OUTPATIENT
Start: 2021-05-25 | End: 2022-01-04

## 2021-05-25 NOTE — TELEPHONE ENCOUNTER
Pharmacy requesting medication refill.  Please approve or deny this request.    Rx requested:  Requested Prescriptions     Pending Prescriptions Disp Refills    montelukast (SINGULAIR) 10 MG tablet [Pharmacy Med Name: Montelukast Sodium 10 MG Oral Tablet] 90 tablet 0     Sig: Take 1 tablet by mouth once daily    FLUoxetine (PROZAC) 20 MG capsule [Pharmacy Med Name: FLUoxetine HCl 20 MG Oral Capsule] 90 capsule 0     Sig: Take 1 capsule by mouth once daily         Last Office Visit:   11/25/2020      Next Visit Date:  Future Appointments   Date Time Provider Sudhakar Fernandez   6/9/2021  1:30 PM Lissa Snow MD [facility]

## 2021-07-16 RX ORDER — CIPROFLOXACIN 500 MG/1
500 TABLET, FILM COATED ORAL 2 TIMES DAILY
Qty: 14 TABLET | Refills: 0 | Status: SHIPPED | OUTPATIENT
Start: 2021-07-16 | End: 2021-07-23

## 2021-07-19 RX ORDER — PANTOPRAZOLE SODIUM 40 MG/1
TABLET, DELAYED RELEASE ORAL
Qty: 60 TABLET | Refills: 0 | Status: SHIPPED | OUTPATIENT
Start: 2021-07-19 | End: 2021-08-03

## 2021-07-19 NOTE — TELEPHONE ENCOUNTER
Requested Prescriptions     Pending Prescriptions Disp Refills    pantoprazole (PROTONIX) 40 MG tablet [Pharmacy Med Name: Pantoprazole Sodium 40 MG Oral Tablet Delayed Release] 60 tablet 0     Sig: Take 1 tablet by mouth once daily

## 2021-07-22 ENCOUNTER — OFFICE VISIT (OUTPATIENT)
Dept: GASTROENTEROLOGY | Age: 66
End: 2021-07-22
Payer: COMMERCIAL

## 2021-07-22 VITALS — HEART RATE: 65 BPM | WEIGHT: 168 LBS | OXYGEN SATURATION: 100 % | BODY MASS INDEX: 29.77 KG/M2 | HEIGHT: 63 IN

## 2021-07-22 DIAGNOSIS — K21.9 GASTROESOPHAGEAL REFLUX DISEASE WITHOUT ESOPHAGITIS: ICD-10-CM

## 2021-07-22 DIAGNOSIS — R14.0 ABDOMINAL BLOATING: ICD-10-CM

## 2021-07-22 DIAGNOSIS — K59.00 CONSTIPATION, UNSPECIFIED CONSTIPATION TYPE: Primary | ICD-10-CM

## 2021-07-22 DIAGNOSIS — R19.8 STRAINING DURING BOWEL MOVEMENTS: ICD-10-CM

## 2021-07-22 DIAGNOSIS — Z86.19 HISTORY OF HELICOBACTER INFECTION: ICD-10-CM

## 2021-07-22 DIAGNOSIS — R14.2 BURPING: ICD-10-CM

## 2021-07-22 PROCEDURE — 99214 OFFICE O/P EST MOD 30 MIN: CPT | Performed by: NURSE PRACTITIONER

## 2021-07-22 RX ORDER — POLYETHYLENE GLYCOL 3350 17 G/17G
17 POWDER, FOR SOLUTION ORAL DAILY
Qty: 510 G | Refills: 3 | Status: SHIPPED | OUTPATIENT
Start: 2021-07-22 | End: 2021-12-13

## 2021-07-22 RX ORDER — DOCUSATE SODIUM 100 MG/1
100 CAPSULE, LIQUID FILLED ORAL NIGHTLY
Qty: 30 CAPSULE | Refills: 3 | Status: SHIPPED | OUTPATIENT
Start: 2021-07-22 | End: 2022-01-06

## 2021-07-22 NOTE — PROGRESS NOTES
Gastroenterology Clinic Follow up Visit    Claudy Young  [id number]  Chief Complaint   Patient presents with    Follow-up    Constipation    Gastroesophageal Reflux     HPI: 73 y.o. female following up after last GI clinic on 3/9/2021. Interval change: Patient reports minimal improvement in her lower GI symptoms since her last visit in March. She continues to have small bowel movements, a couple of times per day, that are hard and she feels as though she has to strain. States she has tried Linzess in the past, however this gave her diarrhea so she stopped it. States she takes 3 tablespoons of fiber per day. She was using Polyethylene glycol powder rx sent to her pharmacy, however she ran out of it and her pharmacy says it is on hold. States she does not drink much water during the day, maybe a bottle per day. Additionally, she states she always feels bloated/gassy. She also states she thought she saw some blood on her stool in the toilet a couple of weeks ago, however she is unsure if it was truly blood. She denies diarrhea or melena. She also endorses a single episode of right upper quadrant pain (sharp/stabbing) last Tuesday/Wednesday (July 13/14th 2021) that lasted about 2 days and she presented to [facility] emergency room. She states in the emergency room she had a ultrasound of her abdomen and lab work and was told she had a cyst on her liver. However, patient states she has had this cyst for ~10 years and it is unchanged. States her pain went away after receiving IV morphine and it has not returned since then. She does endorse a single episode yesterday, July 21, 2021, where she bent over and felt as though she had a brick in her periumbilical area, felt a sharp pain, and then it went away. She denies shortness of breath, chest pain or palpitations. She endorses moderate improvement in her GERD symptoms while taking Protonix 40 mg daily.   States that she only has regurgitation/acidic taste in her mouth around once per month now. However, she states she still burps constantly during the day and it worsens at night, especially when she lays on her left side. Patient states she tried FD guard in the past but stopped taking it due to financial reasons and she was taking so many other medications as well. States she cannot remember if it helped or not. She denies nausea, vomiting, hematemesis, dysphagia, or weight loss. Patient states up until 1 week ago she was taking meloxicam for her shoulder. States she took it for about 10 days. HPI from last GI clinic visit on 3/9/2021  summarized below:  Patient reports improvement in symptoms, did not take Linzess as advised at last visit as she is having daily bowel movements. She does report to straining with every bowel movement. She also reports having hard pellet-like stools. Not keen on pursuing Linzess. On pantoprazole for reflux symptoms, reports good symptom control, has recurrence of symptoms if she misses a couple of doses. Continues to have symptoms of burping and belching. Not on any supplements or over-the-counter medication for the symptoms.     HPI at 1/21/2021 visit:  Patient was given Carafate for her epigastric pain, Helicobacter pylori stool test was performed-negative, advised to take Metamucil and MiraLAX for constipation-patient is taking both MiraLAX and Metamucil with suboptimal response, continues to have 2-3 bowel movements a week. Reflux symptoms well controlled with Protonix, patient requesting refill     HPI at 12/1/2020 visit:  Previously seen by Dr. Fidelia Lubin in 2017 with complaints of left upper quadrant pain, GERD and IBS.  Patient has also recently seen GI at [facility] in May, 2019.  She did undergo an upper endoscopy in 2019, records are not available.  Negative Cologuard test last year.  No family history of colon cancer.  History of Helicobacter pylori infection.   Patient with ongoing complaints of epigastric pain, belching, bloating, nausea with GERD.  No dysphagia.  Her symptoms come in waves, her last episode was 2 weeks ago and is worse at night.  No NSAID use.  Alcohol 2-3 times per week.  Non-smoker.  2 cups of coffee per day. Avoids spicy and acidic foods, but eats a lot of snacks throughout the day (cookies, chocolates, chips).  Pantoprazole twice daily, typically helps.  No dysphagia, odynophagia or regurgitation.   Patient has a bowel movement 4-5 times per week.  Experiences abdominal bloating.  Has to strain to have a BM.  Never feels fully evacuated.  Stool is like hard small balls.  Will experience occasional loose stool.  Takes Metamucil daily.  Does not drink enough water throughout the day. No melena or hematochezia.  Sedentary lifestyle. No chest pain, shortness of breath, or palpitations.  No fever or chills.     Previous GI work up/Endoscopic investigations:  [facility] 2020- Normal negative   EGD 6/18/2019 @ : Regular Z-line. Normal esophagus. Erythematous mucosa in the antrum. Single gastric polyp-resected. Normal duodenal bulb and second portion of the duodenum. Review of Systems   Constitutional: Negative for appetite change, fatigue, fever and unexpected weight change. HENT: Negative for trouble swallowing and voice change. Eyes: Negative. Respiratory: Negative for choking. Cardiovascular: Negative. Gastrointestinal: Positive for abdominal distention and constipation. Negative for abdominal pain, anal bleeding, blood in stool, diarrhea, nausea, rectal pain and vomiting. Endocrine: Negative. Genitourinary: Negative. Musculoskeletal: Negative. Skin: Negative. Allergic/Immunologic: Negative for food allergies. Neurological: Negative. Hematological: Negative. Psychiatric/Behavioral: Negative for agitation, decreased concentration, self-injury, sleep disturbance and suicidal ideas. The patient is not nervous/anxious.        Past medical history, past surgical history, medication list, social and familyhistory reviewed    Pulse 65, height 5' 3\" (1.6 m), weight 168 lb (76.2 kg), last menstrual period 01/15/2006, SpO2 100 %, not currently breastfeeding. Physical Exam  Constitutional:       General: She is not in acute distress. Appearance: Normal appearance. She is normal weight. She is not ill-appearing. HENT:      Head: Normocephalic and atraumatic. Eyes:      General: No scleral icterus. Cardiovascular:      Rate and Rhythm: Normal rate and regular rhythm. Pulses: Normal pulses. Pulmonary:      Effort: Pulmonary effort is normal. No respiratory distress. Breath sounds: Normal breath sounds. Abdominal:      General: Bowel sounds are normal. There is no distension. Palpations: Abdomen is soft. There is no mass. Tenderness: There is no abdominal tenderness. There is no guarding or rebound. Musculoskeletal:         General: Normal range of motion. Lymphadenopathy:      Cervical: No cervical adenopathy. Skin:     General: Skin is warm and dry. Neurological:      Mental Status: She is alert and oriented to person, place, and time. Psychiatric:         Mood and Affect: Mood normal.         Behavior: Behavior normal.         Thought Content: Thought content normal.         Judgment: Judgment normal.       Laboratory, Pathology, Radiology reviewed in detail with relevantimportant investigations summarized below:    Lab Results   Component Value Date    WBC 6.0 07/15/2021    HGB 13.9 07/15/2021    HCT 41.6 07/15/2021    MCV 98 07/15/2021     07/15/2021     Lab Results   Component Value Date    ALT 23 07/15/2021    AST 23 07/15/2021    ALKPHOS 47 07/15/2021    BILITOT 1.2 07/15/2021     Gallbladder US 7/15/21: No cholelithiasis or evidence of cholecystitis. No findings to explain right upper quadrant pain. CBD measures 4.5 mm.   Unchanged hepatic cyst.    Assessment and Plan:  Neris [place] 73 y.o. female with constellation of upper and lower GI symptoms consistent with constipation and GERD. 1. Constipation, unspecified constipation type  2. Straining during bowel movements  3. Abdominal bloating  -Advised to increase fluid intake. Patient's personal goal is to increase from 1 bottle of water per day to 3 bottles of water per day. -Advised to try to defecate after meals, thereby taking advantage of normal postprandial increases in colonic motility. This is particularly important in the morning when colonic motor activity is highest.  -Restart Miralax 17 g of powder dissolved in 8 oz of water once daily and titrate up or down (to a maximum of 34 g daily) to effect.   -Decrease fiber supplement to 2 spoonfuls daily.  -Start stool softener, Colace 100mg by mouth daily or 50mg by mouth 2 times per day. Can go up to 100mg 2 times per day if needed to soften stool.  -We will consider re-adding Linzess 72mcg every other day/every 3rd day at follow up if symptoms persist.    4. Gastroesophageal reflux disease without esophagitis  5. Burping  6. History of Helicobacter infection  -Increase Protonix 40mg to 2 times per day for 6-8 weeks. Then drop back down to daily.  - Advised on the correct dose and timing of the PPI, Preferably 1/2 hour before breakfast. If symptoms are worse at night would recommend taking it half an hour before dinner. -Advised to restart trial of FD guard and record her symptoms.  - Pathophysiology and etiology of reflux discussed at length, with help of images.   - Anti-reflux lifestyle modification discussed at length   --Avoid spicy acidic based foods   --Limit coffee, tea, alcohol use   --Limit the amount of food during meal time, avoid gorging   --Avoid bedtime snacks and eat meals 3 to 4 hrs before lying down   --Elevate the head of the bed with blocks   --Weight reduction advised and discussed at length   -We will consider additional testing(EGD/stool for H. Pylori) at follow up pending patient's clinical course. Return in about 2 months (around 9/22/2021).     DAREN Perez - CNP   Staff Gastroenterology Nurse Practitioner  [facility]

## 2021-07-22 NOTE — PATIENT INSTRUCTIONS
-Increase Protonix to 2 times per day for 6-8 weeks.  Then drop back down to daily.    -Trial of FDgard at night    -Miralax 1 capful daily    -Fiber supplement 2 Tbs daily    -Colace daily

## 2021-07-23 ASSESSMENT — ENCOUNTER SYMPTOMS
ANAL BLEEDING: 0
TROUBLE SWALLOWING: 0
ABDOMINAL PAIN: 0
DIARRHEA: 0
VOMITING: 0
CONSTIPATION: 1
VOICE CHANGE: 0
ABDOMINAL DISTENTION: 1
BLOOD IN STOOL: 0
EYES NEGATIVE: 1
NAUSEA: 0
CHOKING: 0
RECTAL PAIN: 0

## 2021-08-03 ENCOUNTER — PATIENT MESSAGE (OUTPATIENT)
Dept: GASTROENTEROLOGY | Age: 66
End: 2021-08-03

## 2021-08-03 RX ORDER — PANTOPRAZOLE SODIUM 40 MG/1
40 TABLET, DELAYED RELEASE ORAL
Qty: 60 TABLET | Refills: 1 | Status: SHIPPED | OUTPATIENT
Start: 2021-08-03 | End: 2021-10-11

## 2021-08-03 NOTE — TELEPHONE ENCOUNTER
From: Rafael Cuenca  To:  DAREN Alatorre CNP  Sent: 8/3/2021 9:26 AM EDT  Subject: Prescription Question    I would need an increase on the Meds Pantoprazole since you increased my intake to 2x daily     Thanks   Delano Osuna

## 2021-08-17 ENCOUNTER — TELEPHONE (OUTPATIENT)
Dept: GASTROENTEROLOGY | Age: 66
End: 2021-08-17

## 2021-10-11 RX ORDER — PANTOPRAZOLE SODIUM 40 MG/1
TABLET, DELAYED RELEASE ORAL
Qty: 60 TABLET | Refills: 0 | Status: SHIPPED | OUTPATIENT
Start: 2021-10-11 | End: 2022-01-31

## 2021-10-19 DIAGNOSIS — F43.23 ADJUSTMENT DISORDER WITH MIXED ANXIETY AND DEPRESSED MOOD: ICD-10-CM

## 2021-10-20 DIAGNOSIS — F43.23 ADJUSTMENT DISORDER WITH MIXED ANXIETY AND DEPRESSED MOOD: ICD-10-CM

## 2021-10-20 RX ORDER — FLUOXETINE 10 MG/1
CAPSULE ORAL
Qty: 90 CAPSULE | Refills: 1 | Status: SHIPPED | OUTPATIENT
Start: 2021-10-20 | End: 2022-03-13

## 2021-10-20 NOTE — TELEPHONE ENCOUNTER
Patient requesting medication refill. Please approve or deny this request.    Rx requested:  Requested Prescriptions     Pending Prescriptions Disp Refills    FLUoxetine (PROZAC) 10 MG capsule 90 capsule 1     Sig: TAKE 1 CAPSULE BY MOUTH ONCE DAILY. TAKE  IN  ADDITION  TO  20  MG  PROZAC  TO  MAKE  A  TOTAL  OF  30  MG  DAILY         Last Office Visit:   11/25/2020      Next Visit Date:  No future appointments.

## 2021-10-22 RX ORDER — FLUOXETINE 10 MG/1
CAPSULE ORAL
Qty: 90 CAPSULE | Refills: 1 | OUTPATIENT
Start: 2021-10-22

## 2021-11-15 DIAGNOSIS — K21.9 GASTROESOPHAGEAL REFLUX DISEASE WITHOUT ESOPHAGITIS: ICD-10-CM

## 2021-11-15 DIAGNOSIS — R10.13 EPIGASTRIC ABDOMINAL PAIN: ICD-10-CM

## 2021-11-16 RX ORDER — SUCRALFATE 1 G/1
1 TABLET ORAL 4 TIMES DAILY
Qty: 120 TABLET | Refills: 0 | OUTPATIENT
Start: 2021-11-16

## 2021-11-17 NOTE — TELEPHONE ENCOUNTER
Called patient to schedule a follow up appointment LVM. Patient will need a f/u in order to get a refill on medication.

## 2021-12-06 ENCOUNTER — TELEPHONE (OUTPATIENT)
Dept: GASTROENTEROLOGY | Age: 66
End: 2021-12-06

## 2021-12-06 NOTE — TELEPHONE ENCOUNTER
The patient does not want to schedule a follow up visit at this time. She will call back to schedule once she needs her medication.

## 2021-12-13 RX ORDER — POLYETHYLENE GLYCOL 3350 17 G/17G
POWDER, FOR SOLUTION ORAL
Qty: 510 G | Refills: 0 | Status: SHIPPED | OUTPATIENT
Start: 2021-12-13 | End: 2022-02-18

## 2022-01-03 DIAGNOSIS — I10 ESSENTIAL HYPERTENSION: ICD-10-CM

## 2022-01-03 DIAGNOSIS — N95.1 VASOMOTOR SYMPTOMS DUE TO MENOPAUSE: ICD-10-CM

## 2022-01-04 RX ORDER — HYDROCHLOROTHIAZIDE 25 MG/1
TABLET ORAL
Qty: 90 TABLET | Refills: 0 | Status: SHIPPED | OUTPATIENT
Start: 2022-01-04 | End: 2022-03-13

## 2022-01-04 RX ORDER — FLUOXETINE HYDROCHLORIDE 20 MG/1
CAPSULE ORAL
Qty: 60 CAPSULE | Refills: 0 | Status: SHIPPED | OUTPATIENT
Start: 2022-01-04 | End: 2022-04-05

## 2022-01-06 ENCOUNTER — OFFICE VISIT (OUTPATIENT)
Dept: PRIMARY CARE CLINIC | Age: 67
End: 2022-01-06
Payer: COMMERCIAL

## 2022-01-06 VITALS
HEART RATE: 73 BPM | BODY MASS INDEX: 29.84 KG/M2 | RESPIRATION RATE: 18 BRPM | WEIGHT: 168.4 LBS | DIASTOLIC BLOOD PRESSURE: 66 MMHG | HEIGHT: 63 IN | OXYGEN SATURATION: 98 % | SYSTOLIC BLOOD PRESSURE: 120 MMHG

## 2022-01-06 DIAGNOSIS — R35.0 FREQUENCY OF MICTURITION: ICD-10-CM

## 2022-01-06 DIAGNOSIS — Z00.00 ANNUAL PHYSICAL EXAM: Primary | ICD-10-CM

## 2022-01-06 DIAGNOSIS — I10 ESSENTIAL HYPERTENSION: ICD-10-CM

## 2022-01-06 DIAGNOSIS — R41.3 MEMORY DEFICIT: ICD-10-CM

## 2022-01-06 DIAGNOSIS — Z78.0 POST-MENOPAUSAL: ICD-10-CM

## 2022-01-06 DIAGNOSIS — Z12.39 SCREENING BREAST EXAMINATION: ICD-10-CM

## 2022-01-06 LAB
BACTERIA: NEGATIVE /HPF
BILIRUBIN URINE: NEGATIVE
BLOOD, URINE: NEGATIVE
CLARITY: CLEAR
COLOR: YELLOW
EPITHELIAL CELLS, UA: NORMAL /HPF (ref 0–5)
GLUCOSE URINE: NEGATIVE MG/DL
HYALINE CASTS: NORMAL /HPF (ref 0–5)
KETONES, URINE: NEGATIVE MG/DL
LEUKOCYTE ESTERASE, URINE: NEGATIVE
NITRITE, URINE: NEGATIVE
PH UA: 5.5 (ref 5–9)
PROTEIN UA: NEGATIVE MG/DL
RBC UA: NORMAL /HPF (ref 0–5)
SPECIFIC GRAVITY UA: 1.01 (ref 1–1.03)
UROBILINOGEN, URINE: 0.2 E.U./DL
WBC UA: NORMAL /HPF (ref 0–5)

## 2022-01-06 PROCEDURE — 99397 PER PM REEVAL EST PAT 65+ YR: CPT | Performed by: INTERNAL MEDICINE

## 2022-01-06 PROCEDURE — 99214 OFFICE O/P EST MOD 30 MIN: CPT | Performed by: INTERNAL MEDICINE

## 2022-01-06 SDOH — ECONOMIC STABILITY: FOOD INSECURITY: WITHIN THE PAST 12 MONTHS, THE FOOD YOU BOUGHT JUST DIDN'T LAST AND YOU DIDN'T HAVE MONEY TO GET MORE.: NEVER TRUE

## 2022-01-06 SDOH — ECONOMIC STABILITY: TRANSPORTATION INSECURITY
IN THE PAST 12 MONTHS, HAS LACK OF TRANSPORTATION KEPT YOU FROM MEETINGS, WORK, OR FROM GETTING THINGS NEEDED FOR DAILY LIVING?: NO

## 2022-01-06 SDOH — ECONOMIC STABILITY: FOOD INSECURITY: WITHIN THE PAST 12 MONTHS, YOU WORRIED THAT YOUR FOOD WOULD RUN OUT BEFORE YOU GOT MONEY TO BUY MORE.: NEVER TRUE

## 2022-01-06 SDOH — ECONOMIC STABILITY: TRANSPORTATION INSECURITY
IN THE PAST 12 MONTHS, HAS THE LACK OF TRANSPORTATION KEPT YOU FROM MEDICAL APPOINTMENTS OR FROM GETTING MEDICATIONS?: NO

## 2022-01-06 ASSESSMENT — ENCOUNTER SYMPTOMS
SHORTNESS OF BREATH: 0
RHINORRHEA: 0
NAUSEA: 0
SINUS PAIN: 0
VOMITING: 0
SINUS PRESSURE: 0
COUGH: 0
SORE THROAT: 0
ABDOMINAL PAIN: 0
WHEEZING: 0
DIARRHEA: 0

## 2022-01-06 ASSESSMENT — PATIENT HEALTH QUESTIONNAIRE - PHQ9
9. THOUGHTS THAT YOU WOULD BE BETTER OFF DEAD, OR OF HURTING YOURSELF: 0
3. TROUBLE FALLING OR STAYING ASLEEP: 0
10. IF YOU CHECKED OFF ANY PROBLEMS, HOW DIFFICULT HAVE THESE PROBLEMS MADE IT FOR YOU TO DO YOUR WORK, TAKE CARE OF THINGS AT HOME, OR GET ALONG WITH OTHER PEOPLE: 0
6. FEELING BAD ABOUT YOURSELF - OR THAT YOU ARE A FAILURE OR HAVE LET YOURSELF OR YOUR FAMILY DOWN: 0
SUM OF ALL RESPONSES TO PHQ QUESTIONS 1-9: 0
SUM OF ALL RESPONSES TO PHQ QUESTIONS 1-9: 0
5. POOR APPETITE OR OVEREATING: 0
8. MOVING OR SPEAKING SO SLOWLY THAT OTHER PEOPLE COULD HAVE NOTICED. OR THE OPPOSITE, BEING SO FIGETY OR RESTLESS THAT YOU HAVE BEEN MOVING AROUND A LOT MORE THAN USUAL: 0
7. TROUBLE CONCENTRATING ON THINGS, SUCH AS READING THE NEWSPAPER OR WATCHING TELEVISION: 0
SUM OF ALL RESPONSES TO PHQ QUESTIONS 1-9: 0
4. FEELING TIRED OR HAVING LITTLE ENERGY: 0
SUM OF ALL RESPONSES TO PHQ QUESTIONS 1-9: 0
2. FEELING DOWN, DEPRESSED OR HOPELESS: 0

## 2022-01-06 ASSESSMENT — SOCIAL DETERMINANTS OF HEALTH (SDOH): HOW HARD IS IT FOR YOU TO PAY FOR THE VERY BASICS LIKE FOOD, HOUSING, MEDICAL CARE, AND HEATING?: NOT HARD AT ALL

## 2022-01-06 NOTE — PROGRESS NOTES
Subjective:      Patient ID: Hasmukh Storm is a 73 y.o. female      Annual physical exam   HPI   Patient presents for annual physical examination today. PMHx: GERD, hypertension and hyperlipidemia -controlled on meds   Last pap test: negative in 2018    Last colonoscopy: negative in 2020    Last mammogram: negative in 2020     Last tetanus shot: 2017      Hx zoster vaccination? declines       Frequent urination x 1 month   Assoc urgency and stress incontinence. No burning urination. No help from OTC herbs. Hx recurrent UTI. Memory impairment x 2 years   Forgets recent conversations or events. No word-finding difficulty, no attention deficit or disorientation. Denies getting lost and still able to multitask. B12 low normal, RPR negative, TSH normal.   No gait difficulty. Past Medical History:   Diagnosis Date    Anxiety     Appendicitis     Chronic sinusitis 8/7/2017    Depression     DNS (deviated nasal septum) 8/7/2017    Hyperlipidemia     Hypertension     Hypertrophy of nasal turbinates 8/7/2017    Kidney stones     Osteoarthritis     Seasonal allergies      Past Surgical History:   Procedure Laterality Date    COSMETIC SURGERY      KIDNEY STONE SURGERY      LUMBAR LAMINECTOMY      ROTATOR CUFF REPAIR  07/25/2016    DR. DUNCAN    SINUS ENDOSCOPY N/A 8/10/2017    SEPTOPLASTY MICRODEBRIDER ASSISTED TURBINOPLASTY AND OUT-FRACTURING BILATERAL MAXILLARY BALLOON SINUPLASTY RIGHT FRONTAL BALLOON SINUPLASTY INTRANASAL ETHMOIDECTOMY  performed by Sandy Champion MD at [address] [place] [address]      TUBAL LIGATION       Social History     Socioeconomic History    Marital status:      Spouse name: Not on file    Number of children: Not on file    Years of education: Not on file    Highest education level: Not on file   Occupational History    Not on file   Tobacco Use    Smoking status: Former Smoker     Packs/day: 2.00     Years: 22.00     Pack years: 44.00 Start date:      Quit date: 1993     Years since quittin.0    Smokeless tobacco: Never Used   Vaping Use    Vaping Use: Never used   Substance and Sexual Activity    Alcohol use: Yes     Alcohol/week: 0.0 standard drinks     Comment: occ.  Drug use: No    Sexual activity: Yes     Partners: Male   Other Topics Concern    Not on file   Social History Narrative    Not on file     Social Determinants of Health     Financial Resource Strain: Low Risk     Difficulty of Paying Living Expenses: Not hard at all   Food Insecurity: No Food Insecurity    Worried About Running Out of Food in the Last Year: Never true    [address] in the Last Year: Never true   Transportation Needs: No Transportation Needs    Lack of Transportation (Medical): No    Lack of Transportation (Non-Medical):  No   Physical Activity:     Days of Exercise per Week: Not on file    Minutes of Exercise per Session: Not on file   Stress:     Feeling of Stress : Not on file   Social Connections:     Frequency of Communication with Friends and Family: Not on file    Frequency of Social Gatherings with Friends and Family: Not on file    Attends Protestant Services: Not on file    Active Member of [address] [place] [address] or Organizations: Not on file    Attends Club or Organization Meetings: Not on file    Marital Status: Not on file   Intimate Partner Violence:     Fear of Current or Ex-Partner: Not on file    Emotionally Abused: Not on file    Physically Abused: Not on file    Sexually Abused: Not on file   Housing Stability:     Unable to Pay for Housing in the Last Year: Not on file    Number of [place] in the Last Year: Not on file    Unstable Housing in the Last Year: Not on file     Family History   Problem Relation Age of Onset    High Blood Pressure Mother     High Cholesterol Mother     High Blood Pressure Father     High Cholesterol Father     No Known Problems Daughter     No Known Problems Son      Allergies: Glycopyrrolate, Carvedilol, Remeron [mirtazapine], and Zithromax [azithromycin]  Patient Active Problem List   Diagnosis    Anxiety    Depression    Primary osteoarthritis involving multiple joints    Mixed hyperlipidemia    Essential hypertension    Seasonal allergies    Kidney stones    Appendicitis    Palpitations    Spondylosis of lumbar region without myelopathy or radiculopathy    Sacroiliitis (HCC)    DDD (degenerative disc disease), lumbar    Cervical spondylosis without myelopathy    Chronic sinusitis    DNS (deviated nasal septum)    Hypertrophy of nasal turbinates    Gastroesophageal reflux disease without esophagitis    Left foot pain     Current Outpatient Medications on File Prior to Visit   Medication Sig Dispense Refill    hydroCHLOROthiazide (HYDRODIURIL) 25 MG tablet Take 1 tablet by mouth once daily 90 tablet 0    FLUoxetine (PROZAC) 20 MG capsule Take 1 capsule by mouth once daily 60 capsule 0    polyethylene glycol (GLYCOLAX) 17 GM/SCOOP powder TAKE 17 G BY MOUTH ONCE DAILY 510 g 0    FLUoxetine (PROZAC) 10 MG capsule TAKE 1 CAPSULE BY MOUTH ONCE DAILY.  TAKE  IN  ADDITION  TO  20  MG  PROZAC  TO  MAKE  A  TOTAL  OF  30  MG  DAILY 90 capsule 1    pantoprazole (PROTONIX) 40 MG tablet Take 1 tablet by mouth once daily 60 tablet 0    montelukast (SINGULAIR) 10 MG tablet Take 1 tablet by mouth once daily 90 tablet 3    rosuvastatin (CRESTOR) 40 MG tablet Take 1 tablet by mouth once daily 90 tablet 3    Psyllium (METAMUCIL FIBER PO) Take by mouth      levocetirizine (XYZAL) 5 MG tablet Take 1 tablet by mouth nightly 30 tablet 5    linaCLOtide (LINZESS) 72 MCG CAPS capsule Take 1 capsule by mouth every morning (before breakfast) (Patient not taking: Reported on 7/22/2021) 30 capsule 3    sucralfate (CARAFATE) 1 GM tablet Take 1 tablet by mouth 4 times daily 120 tablet 3    mometasone (NASONEX) 50 MCG/ACT nasal spray USE 1 SPRAY(S) IN EACH NOSTRIL TWICE DAILY       No current facility-administered medications on file prior to visit. Review of Systems   Constitutional: Negative for chills, diaphoresis, fatigue and fever. HENT: Negative for congestion, ear discharge, ear pain, rhinorrhea, sinus pressure, sinus pain, sneezing and sore throat. Respiratory: Negative for cough, shortness of breath and wheezing. Cardiovascular: Negative for chest pain. Gastrointestinal: Negative for abdominal pain, diarrhea, nausea and vomiting. Endocrine: Negative for cold intolerance and heat intolerance. Genitourinary: Negative for dysuria and frequency. Neurological: Negative for dizziness and light-headedness. Psychiatric/Behavioral: Positive for confusion. Negative for decreased concentration, dysphoric mood, hallucinations, sleep disturbance and suicidal ideas. The patient is not nervous/anxious and is not hyperactive. Objective:   /66   Pulse 73   Resp 18   Ht 5' 3\" (1.6 m)   Wt 168 lb 6.4 oz (76.4 kg)   LMP 01/15/2006   SpO2 98%   BMI 29.83 kg/m²     Physical Exam  Constitutional:       General: She is not in acute distress. Appearance: She is not diaphoretic. Cardiovascular:      Rate and Rhythm: Normal rate and regular rhythm. Heart sounds: Normal heart sounds, S1 normal and S2 normal.   Pulmonary:      Effort: Pulmonary effort is normal. No respiratory distress. Breath sounds: Normal breath sounds. No wheezing or rales. Chest:      Chest wall: No tenderness. Abdominal:      General: Bowel sounds are normal.      Tenderness: There is no abdominal tenderness. Neurological:      General: No focal deficit present. Mental Status: She is alert. Cranial Nerves: No cranial nerve deficit. Assessment:       Diagnosis Orders   1. Annual physical exam  CBC With Auto Differential    Comprehensive Metabolic Panel    Lipid, Fasting    TSH with Reflex    Hemoglobin A1C   2.  Frequency of micturition  Urinalysis    URINALYSIS, MICRO 3. Memory deficit  Aleta Castellanos MD,  [facility]    Methylmalonic Acid, Serum    likely normal aging   4. Essential hypertension Controlled Zinc    Magnesium   5. Post-menopausal  DEXA BONE DENSITY AXIAL SKELETON   6. Screening breast examination  SON DIGITAL SCREEN W OR WO CAD BILATERAL       Plan:      Orders Placed This Encounter   Procedures    SON DIGITAL SCREEN W OR WO CAD BILATERAL     Standing Status:   Future     Standing Expiration Date:   3/8/2023     Order Specific Question:   Reason for exam:     Answer:   screening    DEXA BONE DENSITY AXIAL SKELETON     Standing Status:   Future     Standing Expiration Date:   1/6/2023    CBC With Auto Differential     Standing Status:   Future     Standing Expiration Date:   1/6/2023    Comprehensive Metabolic Panel     Standing Status:   Future     Standing Expiration Date:   1/6/2023    Lipid, Fasting     Standing Status:   Future     Standing Expiration Date:   1/6/2023    TSH with Reflex     Standing Status:   Future     Standing Expiration Date:   1/6/2023    Hemoglobin A1C     Standing Status:   Future     Standing Expiration Date:   1/6/2023    Urinalysis     Standing Status:   Future     Standing Expiration Date:   1/6/2023    URINALYSIS, MICRO     Standing Status:   Future     Standing Expiration Date:   1/6/2023    Methylmalonic Acid, Serum     Standing Status:   Future     Standing Expiration Date:   1/6/2023    Zinc     Standing Status:   Future     Standing Expiration Date:   1/6/2023    Magnesium     Standing Status:   Future     Standing Expiration Date:   1/6/2023   Aleta Castellanos MD,  [facility]     Referral Priority:   Routine     Referral Type:   Eval and Treat     Referral Reason:   Specialty Services Required     Referred to Provider:   Miriam Gan MD     Requested Specialty:   Neurology     Number of Visits Requested:   1     No orders of the defined types were placed in this encounter.     Return in about 6 months (around 7/6/2022) for follow up, with PCP.

## 2022-01-09 DIAGNOSIS — N32.81 OVERACTIVE BLADDER: Primary | ICD-10-CM

## 2022-01-10 DIAGNOSIS — Z00.00 ANNUAL PHYSICAL EXAM: ICD-10-CM

## 2022-01-10 DIAGNOSIS — R41.3 MEMORY DEFICIT: ICD-10-CM

## 2022-01-10 DIAGNOSIS — I10 ESSENTIAL HYPERTENSION: ICD-10-CM

## 2022-01-10 LAB
ALBUMIN SERPL-MCNC: 4.5 G/DL (ref 3.5–4.6)
ALP BLD-CCNC: 50 U/L (ref 40–130)
ALT SERPL-CCNC: 19 U/L (ref 0–33)
ANION GAP SERPL CALCULATED.3IONS-SCNC: 10 MEQ/L (ref 9–15)
AST SERPL-CCNC: 20 U/L (ref 0–35)
BASOPHILS ABSOLUTE: 0 K/UL (ref 0–0.2)
BASOPHILS RELATIVE PERCENT: 0.9 %
BILIRUB SERPL-MCNC: 0.8 MG/DL (ref 0.2–0.7)
BUN BLDV-MCNC: 14 MG/DL (ref 8–23)
CALCIUM SERPL-MCNC: 9 MG/DL (ref 8.5–9.9)
CHLORIDE BLD-SCNC: 102 MEQ/L (ref 95–107)
CHOLESTEROL, FASTING: 142 MG/DL (ref 0–199)
CO2: 29 MEQ/L (ref 20–31)
CREAT SERPL-MCNC: 0.76 MG/DL (ref 0.5–0.9)
EOSINOPHILS ABSOLUTE: 0.1 K/UL (ref 0–0.7)
EOSINOPHILS RELATIVE PERCENT: 3.3 %
GFR AFRICAN AMERICAN: >60
GFR NON-AFRICAN AMERICAN: >60
GLOBULIN: 2.5 G/DL (ref 2.3–3.5)
GLUCOSE BLD-MCNC: 89 MG/DL (ref 70–99)
HBA1C MFR BLD: 5.1 % (ref 4.8–5.9)
HCT VFR BLD CALC: 40.4 % (ref 37–47)
HDLC SERPL-MCNC: 64 MG/DL (ref 40–59)
HEMOGLOBIN: 13.7 G/DL (ref 12–16)
LDL CHOLESTEROL CALCULATED: 63 MG/DL (ref 0–129)
LYMPHOCYTES ABSOLUTE: 1.5 K/UL (ref 1–4.8)
LYMPHOCYTES RELATIVE PERCENT: 33 %
MAGNESIUM: 2 MG/DL (ref 1.7–2.4)
MCH RBC QN AUTO: 32.3 PG (ref 27–31.3)
MCHC RBC AUTO-ENTMCNC: 34 % (ref 33–37)
MCV RBC AUTO: 94.9 FL (ref 82–100)
MONOCYTES ABSOLUTE: 0.4 K/UL (ref 0.2–0.8)
MONOCYTES RELATIVE PERCENT: 9.3 %
NEUTROPHILS ABSOLUTE: 2.4 K/UL (ref 1.4–6.5)
NEUTROPHILS RELATIVE PERCENT: 53.5 %
PDW BLD-RTO: 12.8 % (ref 11.5–14.5)
PLATELET # BLD: 211 K/UL (ref 130–400)
POTASSIUM SERPL-SCNC: 3.6 MEQ/L (ref 3.4–4.9)
RBC # BLD: 4.26 M/UL (ref 4.2–5.4)
SODIUM BLD-SCNC: 141 MEQ/L (ref 135–144)
TOTAL PROTEIN: 7 G/DL (ref 6.3–8)
TRIGLYCERIDE, FASTING: 74 MG/DL (ref 0–150)
TSH REFLEX: 2.85 UIU/ML (ref 0.44–3.86)
WBC # BLD: 4.4 K/UL (ref 4.8–10.8)

## 2022-01-11 ENCOUNTER — OFFICE VISIT (OUTPATIENT)
Dept: GASTROENTEROLOGY | Age: 67
End: 2022-01-11
Payer: COMMERCIAL

## 2022-01-11 VITALS — WEIGHT: 164 LBS | HEART RATE: 74 BPM | BODY MASS INDEX: 29.06 KG/M2 | OXYGEN SATURATION: 100 % | HEIGHT: 63 IN

## 2022-01-11 DIAGNOSIS — R14.0 ABDOMINAL BLOATING: ICD-10-CM

## 2022-01-11 DIAGNOSIS — N32.81 OVERACTIVE BLADDER: Primary | ICD-10-CM

## 2022-01-11 DIAGNOSIS — R10.13 EPIGASTRIC ABDOMINAL PAIN: ICD-10-CM

## 2022-01-11 DIAGNOSIS — R10.12 LUQ PAIN: ICD-10-CM

## 2022-01-11 DIAGNOSIS — K21.9 GASTROESOPHAGEAL REFLUX DISEASE WITHOUT ESOPHAGITIS: Primary | ICD-10-CM

## 2022-01-11 DIAGNOSIS — Z86.19 HISTORY OF HELICOBACTER INFECTION: ICD-10-CM

## 2022-01-11 DIAGNOSIS — K59.00 CONSTIPATION, UNSPECIFIED CONSTIPATION TYPE: ICD-10-CM

## 2022-01-11 DIAGNOSIS — R14.2 BURPING: ICD-10-CM

## 2022-01-11 PROCEDURE — 99214 OFFICE O/P EST MOD 30 MIN: CPT | Performed by: NURSE PRACTITIONER

## 2022-01-11 RX ORDER — FAMOTIDINE 20 MG/1
20 TABLET, FILM COATED ORAL 2 TIMES DAILY
Qty: 60 TABLET | Refills: 3 | Status: SHIPPED | OUTPATIENT
Start: 2022-01-11 | End: 2022-09-30 | Stop reason: ALTCHOICE

## 2022-01-11 RX ORDER — ACTIVATED CHARCOAL 260 MG
CAPSULE ORAL
Qty: 120 CAPSULE | Refills: 3 | Status: SHIPPED | OUTPATIENT
Start: 2022-01-11

## 2022-01-11 NOTE — PATIENT INSTRUCTIONS
- Pantoprazole 1 time per day ( Hold 10 days before stool sample)    - Stool sample    - Activated charcol capsules up to 4 times per day ( do not take with other medications)    - Increase to 2 capfuls of Miralax

## 2022-01-11 NOTE — PROGRESS NOTES
Gastroenterology Clinic Follow up Visit    Estephania Astorga  [id number]  Chief Complaint   Patient presents with    Follow-up    Gastroesophageal Reflux    Constipation     HPI: 73 y.o. female following up after last GI clinic on 7/22/21. Interval change: Patient continues to report excess belching/bloating all day long, worse with laying down despite taking pantoprazole 40 mg and carafate daily. She does report her GERD symptoms are improved with pantoprazole. States last week she had an episode of vomiting after laying in bed burping for 2 to 3 hours. Has tried FD nasreen in the past, but it was too costly/unsure if it helped. She denies nausea/hematemesis, dysphagia or unintentional weight loss. Patient reports intermittent LUQ/epigastric pain/bloating that occurs around every 2 weeks, lasting up to 4 hours. States she is having bowel movements daily now, [place] stool scale 3, however she states her stools can be hard at times, tried stool softener however this has not helped much. She reports taking 1-1/2 tablespoons of Metamucil and 1 capful of MiraLAX daily. States she drinks about 32 ounces of water per day. States she has had colonoscopy many years ago, however is not keen on repeating due to difficulty with bowel prep. She denies hematochezia or melena. She denies smoking, ETOH, or illicit drug use. She does not use NSAIDs, takes tylenol as needed. HPI from last GI clinic visit on 7/22/21 summarized below:  Patient reports minimal improvement in her lower GI symptoms since her last visit in March. She continues to have small bowel movements, a couple of times per day, that are hard and she feels as though she has to strain. States she has tried Linzess in the past, however this gave her diarrhea so she stopped it. States she takes 3 tablespoons of fiber per day. She was using Polyethylene glycol powder rx sent to her pharmacy, however she ran out of it and her pharmacy says it is on hold. States she does not drink much water during the day, maybe a bottle per day. Additionally, she states she always feels bloated/gassy. She also states she thought she saw some blood on her stool in the toilet a couple of weeks ago, however she is unsure if it was truly blood. She denies diarrhea or melena. She also endorses a single episode of right upper quadrant pain (sharp/stabbing) last Tuesday/Wednesday (July 13/14th 2021) that lasted about 2 days and she presented to [facility] emergency room. She states in the emergency room she had a ultrasound of her abdomen and lab work and was told she had a cyst on her liver. However, patient states she has had this cyst for ~10 years and it is unchanged. States her pain went away after receiving IV morphine and it has not returned since then. She does endorse a single episode yesterday, July 21, 2021, where she bent over and felt as though she had a brick in her periumbilical area, felt a sharp pain, and then it went away. She denies shortness of breath, chest pain or palpitations. She endorses moderate improvement in her GERD symptoms while taking Protonix 40 mg daily. States that she only has regurgitation/acidic taste in her mouth around once per month now. However, she states she still burps constantly during the day and it worsens at night, especially when she lays on her left side. Patient states she tried FD guard in the past but stopped taking it due to financial reasons and she was taking so many other medications as well. States she cannot remember if it helped or not. She denies nausea, vomiting, hematemesis, dysphagia, or weight loss. Patient states up until 1 week ago she was taking meloxicam for her shoulder.   States she took it for about 10 days.     HPI from last GI clinic visit on 3/9/2021  summarized below:  Patient reports improvement in symptoms, did not take [place] Sandeep as advised at last visit as she is having daily bowel movements.  She does report to straining with every bowel movement.  She also reports having hard pellet-like stools.  Not keen on pursuing Linzess. On pantoprazole for reflux symptoms, reports good symptom control, has recurrence of symptoms if she misses a couple of doses. Continues to have symptoms of burping and belching.  Not on any supplements or over-the-counter medication for the symptoms.     HPI at 1/21/2021 visit:  Patient was given Carafate for her epigastric pain, Helicobacter pylori stool test was performed-negative, advised to take Metamucil and MiraLAX for constipation-patient is taking both MiraLAX and Metamucil with suboptimal response, continues to have 2-3 bowel movements a week. Reflux symptoms well controlled with Protonix, patient requesting refill     HPI at 12/1/2020 visit:  Previously seen by Dr. Leah Gonzalez in 2017 with complaints of left upper quadrant pain, GERD and IBS.  Patient has also recently seen GI at [facility] in May, 2019.  She did undergo an upper endoscopy in 2019, records are not available.  Negative Cologuard test last year.  No family history of colon cancer.  History of Helicobacter pylori infection. Patient with ongoing complaints of epigastric pain, belching, bloating, nausea with GERD.  No dysphagia.  Her symptoms come in waves, her last episode was 2 weeks ago and is worse at night.  No NSAID use.  Alcohol 2-3 times per week.  Non-smoker.  2 cups of coffee per day. Avoids spicy and acidic foods, but eats a lot of snacks throughout the day (cookies, chocolates, chips).  Pantoprazole twice daily, typically helps.  No dysphagia, odynophagia or regurgitation.   Patient has a bowel movement 4-5 times per week.  Experiences abdominal bloating.  Has to strain to have a BM.  Never feels fully evacuated.  Stool is like hard small balls.  Will experience occasional loose stool.  Takes Metamucil daily.  Does not drink enough water throughout the day.  No melena or hematochezia.  Sedentary lifestyle. No chest pain, shortness of breath, or palpitations.  No fever or chills.     Previous GI work up/Endoscopic investigations:  Cologuard 2020- Normal negative   EGD 6/18/2019 @ : Regular Z-line. Normal esophagus. Erythematous mucosa in the antrum. Single gastric polyp-resected. Normal duodenal bulb and second portion of the duodenum. Colonoscopy -many years ago, no records available    Review of Systems   All other systems reviewed and are negative. Past medical history, past surgical history, medication list, social and familyhistory reviewed    Pulse 74, height 5' 3\" (1.6 m), weight 164 lb (74.4 kg), last menstrual period 01/15/2006, SpO2 100 %, not currently breastfeeding. Physical Exam  Constitutional:       General: She is not in acute distress. Appearance: Normal appearance. She is normal weight. She is not ill-appearing. HENT:      Head: Normocephalic and atraumatic. Eyes:      General: No scleral icterus. Cardiovascular:      Rate and Rhythm: Normal rate and regular rhythm. Pulses: Normal pulses. Pulmonary:      Effort: Pulmonary effort is normal. No respiratory distress. Breath sounds: Normal breath sounds. Abdominal:      General: Bowel sounds are normal. There is no distension. Palpations: Abdomen is soft. There is no mass. Tenderness: There is abdominal tenderness (mild) in the epigastric area and left upper quadrant. There is no guarding or rebound. Musculoskeletal:         General: Normal range of motion. Lymphadenopathy:      Cervical: No cervical adenopathy. Skin:     General: Skin is warm and dry. Coloration: Skin is not jaundiced. Neurological:      Mental Status: She is alert and oriented to person, place, and time. Psychiatric:         Mood and Affect: Mood normal.         Behavior: Behavior normal.         Thought Content:  Thought content normal.         Judgment: Judgment normal.       Laboratory, Pathology, Radiology reviewed in detail with relevantimportant investigations summarized below:    Lab Results   Component Value Date    WBC 4.4 (L) 01/10/2022    HGB 13.7 01/10/2022    HCT 40.4 01/10/2022    MCV 94.9 01/10/2022     01/10/2022     Lab Results   Component Value Date    ALT 19 01/10/2022    AST 20 01/10/2022    ALKPHOS 50 01/10/2022    BILITOT 0.8 (H) 01/10/2022     Gallbladder US 7/15/21: No cholelithiasis or evidence of cholecystitis. No findings to explain right upper quadrant pain. CBD measures 4.5 mm. Unchanged hepatic cyst.    Assessment and Plan:  Claire Guallpa 73 y.o. female with longstanding history of constipation, modest improvement with taking Metamucil 1.5 tablespoons and MiraLAX 1 capful daily. Patient with constellation of continued upper GI symptoms including GERD, epigastric/LUQ pain, constant burping/abdominal bloating (tried FD guard in the past but too costly/?ineffective). 1. Gastroesophageal reflux disease without esophagitis  2. Epigastric abdominal pain  3. LUQ pain  4. Abdominal bloating  5. Burping  6. History of Helicobacter infection  -Continue Protonix 40 mg by mouth daily. Add Pepcid 20 mg by mouth twice daily as needed.  -Patient with remote history of H. pylori infection, will check stool antigen. Advised patient to hold PPI therapy for 10 to 14 days prior to stool specimen. Patient may take OTC medications and Pepcid while holding PPI. -Trial of Charcocaps 4 times daily as needed.  -Will consider EGD if patient's symptoms persist at follow-up. - Advised on the correct dose and timing of the PPI, Preferably 1/2 hour before breakfast. If symptoms are worse at night would recommend taking it half an hour before dinner.  - Pathophysiology and etiology of reflux discussed at length, with help of images.   - Anti-reflux lifestyle modification discussed at length   --Avoid spicy acidic based foods   --Limit coffee, tea, alcohol use   --Limit the amount of food during meal time, avoid gorging   --Avoid bedtime snacks and eat meals 3 to 4 hrs before lying down   --Elevate the head of the bed with blocks   --Weight reduction advised and discussed at length     7. Constipation, unspecified constipation type  -Increase MiraLAX to 2 capfuls daily.  -Increase fiber supplement to 2 spoonfuls daily    Return in about 6 weeks (around 2/22/2022).     Mickel Halsted, APRN - CNP   Staff Gastroenterology Nurse Practitioner  [facility]

## 2022-01-13 LAB — ZINC: 77.8 UG/DL (ref 60–120)

## 2022-01-15 DIAGNOSIS — N32.81 OVERACTIVE BLADDER: Primary | ICD-10-CM

## 2022-01-15 LAB — METHYLMALONIC ACID: 0.22 UMOL/L (ref 0–0.4)

## 2022-01-19 ENCOUNTER — HOSPITAL ENCOUNTER (OUTPATIENT)
Dept: WOMENS IMAGING | Age: 67
Discharge: HOME | End: 2022-01-21
Payer: COMMERCIAL

## 2022-01-19 VITALS — HEIGHT: 63 IN | BODY MASS INDEX: 29.05 KG/M2

## 2022-01-19 DIAGNOSIS — R10.13 EPIGASTRIC ABDOMINAL PAIN: ICD-10-CM

## 2022-01-19 DIAGNOSIS — Z12.39 SCREENING BREAST EXAMINATION: ICD-10-CM

## 2022-01-19 DIAGNOSIS — K21.9 GASTROESOPHAGEAL REFLUX DISEASE WITHOUT ESOPHAGITIS: ICD-10-CM

## 2022-01-19 DIAGNOSIS — Z12.31 ENCOUNTER FOR SCREENING MAMMOGRAM FOR BREAST CANCER: ICD-10-CM

## 2022-01-19 DIAGNOSIS — Z78.0 POST-MENOPAUSAL: ICD-10-CM

## 2022-01-19 PROCEDURE — 77063 BREAST TOMOSYNTHESIS BI: CPT

## 2022-01-19 PROCEDURE — 77080 DXA BONE DENSITY AXIAL: CPT

## 2022-01-21 RX ORDER — SUCRALFATE 1 G/1
1 TABLET ORAL 4 TIMES DAILY
Qty: 120 TABLET | Refills: 3 | Status: SHIPPED | OUTPATIENT
Start: 2022-01-21

## 2022-01-22 DIAGNOSIS — Z86.19 HISTORY OF HELICOBACTER INFECTION: ICD-10-CM

## 2022-01-25 DIAGNOSIS — M85.80 OSTEOPENIA, UNSPECIFIED LOCATION: Primary | ICD-10-CM

## 2022-01-26 LAB — H PYLORI ANTIGEN STOOL: NEGATIVE

## 2022-01-31 RX ORDER — PANTOPRAZOLE SODIUM 40 MG/1
TABLET, DELAYED RELEASE ORAL
Qty: 60 TABLET | Refills: 0 | Status: SHIPPED | OUTPATIENT
Start: 2022-01-31 | End: 2022-02-07

## 2022-02-01 DIAGNOSIS — M85.80 OSTEOPENIA, UNSPECIFIED LOCATION: ICD-10-CM

## 2022-02-02 LAB — VITAMIN D 25-HYDROXY: 30 NG/ML (ref 30–100)

## 2022-02-07 RX ORDER — PANTOPRAZOLE SODIUM 40 MG/1
TABLET, DELAYED RELEASE ORAL
Qty: 30 TABLET | Refills: 0 | Status: SHIPPED | OUTPATIENT
Start: 2022-02-07 | End: 2022-05-11

## 2022-02-18 RX ORDER — POLYETHYLENE GLYCOL 3350 17 G/17G
POWDER, FOR SOLUTION ORAL
Qty: 510 G | Refills: 0 | Status: SHIPPED | OUTPATIENT
Start: 2022-02-18 | End: 2022-03-28

## 2022-02-19 DIAGNOSIS — E78.2 MIXED HYPERLIPIDEMIA: ICD-10-CM

## 2022-02-21 RX ORDER — ROSUVASTATIN CALCIUM 40 MG/1
TABLET, COATED ORAL
Qty: 90 TABLET | Refills: 3 | Status: SHIPPED | OUTPATIENT
Start: 2022-02-21 | End: 2022-05-13

## 2022-03-11 DIAGNOSIS — I10 ESSENTIAL HYPERTENSION: ICD-10-CM

## 2022-03-11 DIAGNOSIS — F43.23 ADJUSTMENT DISORDER WITH MIXED ANXIETY AND DEPRESSED MOOD: ICD-10-CM

## 2022-03-13 RX ORDER — HYDROCHLOROTHIAZIDE 25 MG/1
TABLET ORAL
Qty: 90 TABLET | Refills: 3 | Status: SHIPPED | OUTPATIENT
Start: 2022-03-13

## 2022-03-13 RX ORDER — FLUOXETINE 10 MG/1
CAPSULE ORAL
Qty: 90 CAPSULE | Refills: 1 | Status: SHIPPED | OUTPATIENT
Start: 2022-03-13 | End: 2022-09-13

## 2022-03-15 ENCOUNTER — OFFICE VISIT (OUTPATIENT)
Dept: OBGYN CLINIC | Age: 67
End: 2022-03-15
Payer: COMMERCIAL

## 2022-03-15 VITALS
SYSTOLIC BLOOD PRESSURE: 102 MMHG | HEIGHT: 63 IN | WEIGHT: 166 LBS | BODY MASS INDEX: 29.41 KG/M2 | HEART RATE: 64 BPM | DIASTOLIC BLOOD PRESSURE: 62 MMHG

## 2022-03-15 DIAGNOSIS — N32.81 OAB (OVERACTIVE BLADDER): Primary | ICD-10-CM

## 2022-03-15 DIAGNOSIS — N32.81 OAB (OVERACTIVE BLADDER): ICD-10-CM

## 2022-03-15 LAB
BILIRUBIN URINE: NEGATIVE
BLOOD, URINE: NEGATIVE
CLARITY: CLEAR
COLOR: YELLOW
GLUCOSE URINE: NEGATIVE MG/DL
KETONES, URINE: NEGATIVE MG/DL
LEUKOCYTE ESTERASE, URINE: NEGATIVE
NITRITE, URINE: NEGATIVE
PH UA: 6.5 (ref 5–9)
PROTEIN UA: NEGATIVE MG/DL
SPECIFIC GRAVITY UA: 1.01 (ref 1–1.03)
UROBILINOGEN, URINE: 0.2 E.U./DL

## 2022-03-15 PROCEDURE — 99203 OFFICE O/P NEW LOW 30 MIN: CPT | Performed by: OBSTETRICS & GYNECOLOGY

## 2022-03-15 RX ORDER — SOLIFENACIN SUCCINATE 5 MG/1
5 TABLET, FILM COATED ORAL DAILY
Qty: 15 TABLET | Refills: 0 | Status: SHIPPED | OUTPATIENT
Start: 2022-03-15 | End: 2022-03-15

## 2022-03-15 RX ORDER — SOLIFENACIN SUCCINATE 10 MG/1
10 TABLET, FILM COATED ORAL DAILY
Qty: 30 TABLET | Refills: 0 | Status: SHIPPED | OUTPATIENT
Start: 2022-03-15 | End: 2023-03-15

## 2022-03-15 NOTE — PROGRESS NOTES
Malathi Guadalupe is a 78 y.o. female who presents here today for complaints of Other (OAB. Referred by Dr. Teri Biswas. )    Frequent urination during the day every hour . Patient currently is on HCTZ, but mentions she was having similar symptoms even before she started the HCTZ recently . And symptoms are getting worse. Nocturia 2-3 times. .      Vitals:  /62 (Site: Left Upper Arm, Position: Sitting, Cuff Size: Medium Adult)   Pulse 64   Ht 5' 3\" (1.6 m)   Wt 166 lb (75.3 kg)   LMP 01/15/2006   BMI 29.41 kg/m²   Allergies:  Glycopyrrolate, Carvedilol, Remeron [mirtazapine], and Zithromax [azithromycin]  Past Medical History:   Diagnosis Date    Anxiety     Appendicitis     Chronic sinusitis 2017    Depression     DNS (deviated nasal septum) 2017    Hyperlipidemia     Hypertension     Hypertrophy of nasal turbinates 2017    Kidney stones     Osteoarthritis     Seasonal allergies      Past Surgical History:   Procedure Laterality Date    COSMETIC SURGERY      KIDNEY STONE SURGERY      LUMBAR LAMINECTOMY      ROTATOR CUFF REPAIR  2016    DR. DUNCAN    SINUS ENDOSCOPY N/A 8/10/2017    SEPTOPLASTY MICRODEBRIDER ASSISTED TURBINOPLASTY AND OUT-FRACTURING BILATERAL MAXILLARY BALLOON SINUPLASTY RIGHT FRONTAL BALLOON SINUPLASTY INTRANASAL ETHMOIDECTOMY  performed by [facility]      TUBAL LIGATION       OB History        2    Para   2    Term   2            AB        Living           SAB        IAB        Ectopic        Molar        Multiple        Live Births                  Family History   Problem Relation Age of Onset    High Blood Pressure Mother     High Cholesterol Mother     High Blood Pressure Father     High Cholesterol Father     No Known Problems Daughter     No Known Problems Son      Social History     Socioeconomic History    Marital status:      Spouse name: Not on file    Number of children: Not on file    Years of education: Not on file    Highest education level: Not on file   Occupational History    Not on file   Tobacco Use    Smoking status: Former Smoker     Packs/day: 2.00     Years: 22.00     Pack years: 44.00     Start date:      Quit date: 1993     Years since quittin.2    Smokeless tobacco: Never Used   Vaping Use    Vaping Use: Never used   Substance and Sexual Activity    Alcohol use: Yes     Alcohol/week: 0.0 standard drinks     Comment: occ.  Drug use: No    Sexual activity: Yes     Partners: Male   Other Topics Concern    Not on file   Social History Narrative    Not on file     Social Determinants of Health     Financial Resource Strain: Low Risk     Difficulty of Paying Living Expenses: Not hard at all   Food Insecurity: No Food Insecurity    Worried About Running Out of Food in the Last Year: Never true    [address] in the Last Year: Never true   Transportation Needs: No Transportation Needs    Lack of Transportation (Medical): No    Lack of Transportation (Non-Medical):  No   Physical Activity:     Days of Exercise per Week: Not on file    Minutes of Exercise per Session: Not on file   Stress:     Feeling of Stress : Not on file   Social Connections:     Frequency of Communication with Friends and Family: Not on file    Frequency of Social Gatherings with Friends and Family: Not on file    Attends Spiritism Services: Not on file    Active Member of Clubs or Organizations: Not on file    Attends Club or Organization Meetings: Not on file    Marital Status: Not on file   Intimate Partner Violence:     Fear of Current or Ex-Partner: Not on file    Emotionally Abused: Not on file    Physically Abused: Not on file    Sexually Abused: Not on file   Housing Stability:     Unable to Pay for Housing in the Last Year: Not on file    Number of [place] in the Last Year: Not on file    Unstable Housing in the Last Year: Not on file       Contraceptive method:  none    Patient's medications, allergies, past medical, surgical, social and family histories were reviewed and updated as appropriate. Review of Systems  As per chief complaint   All other systems reviewed and are negative. urgency, frequency, incontinence, nocturia    Physical Exam:  Vitals:  /62 (Site: Left Upper Arm, Position: Sitting, Cuff Size: Medium Adult)   Pulse 64   Ht 5' 3\" (1.6 m)   Wt 166 lb (75.3 kg)   LMP 01/15/2006   BMI 29.41 kg/m²   Lungs: CTAB   Heart : Regular S1/S2, no M/R/G  Abdomen: Soft , NT, ND , + BS   Pelvic exam : deferred    Assessment:      Diagnosis Orders   1. OAB (overactive bladder)         Plan:     Trial of vesicare 10 mg po daily for 2 weeks  Return in 2 weeks   Urine for analysis and culture      No orders of the defined types were placed in this encounter. Orders Placed This Encounter   Medications    DISCONTD: solifenacin (VESICARE) 5 MG tablet     Sig: Take 1 tablet by mouth daily     Dispense:  15 tablet     Refill:  0    solifenacin (VESICARE) 10 MG tablet     Sig: Take 1 tablet by mouth daily     Dispense:  30 tablet     Refill:  0       Follow Up:  Return in about 2 weeks (around 3/29/2022) for medication assessment.         Shameka Fischer MD

## 2022-03-17 DIAGNOSIS — N95.1 VASOMOTOR SYMPTOMS DUE TO MENOPAUSE: ICD-10-CM

## 2022-03-17 LAB — URINE CULTURE, ROUTINE: NORMAL

## 2022-03-17 RX ORDER — FLUOXETINE HYDROCHLORIDE 20 MG/1
CAPSULE ORAL
Qty: 60 CAPSULE | Refills: 0 | OUTPATIENT
Start: 2022-03-17

## 2022-03-24 DIAGNOSIS — N95.1 VASOMOTOR SYMPTOMS DUE TO MENOPAUSE: ICD-10-CM

## 2022-03-24 RX ORDER — FLUOXETINE HYDROCHLORIDE 20 MG/1
CAPSULE ORAL
Qty: 60 CAPSULE | Refills: 0 | OUTPATIENT
Start: 2022-03-24

## 2022-03-28 RX ORDER — POLYETHYLENE GLYCOL 3350 17 G/17G
POWDER, FOR SOLUTION ORAL
Qty: 510 G | Refills: 3 | Status: SHIPPED | OUTPATIENT
Start: 2022-03-28

## 2022-04-04 DIAGNOSIS — N95.1 VASOMOTOR SYMPTOMS DUE TO MENOPAUSE: ICD-10-CM

## 2022-04-07 RX ORDER — FLUOXETINE HYDROCHLORIDE 20 MG/1
CAPSULE ORAL
Qty: 60 CAPSULE | Refills: 3 | Status: SHIPPED | OUTPATIENT
Start: 2022-04-07

## 2022-05-11 DIAGNOSIS — J30.9 ALLERGIC RHINITIS, UNSPECIFIED SEASONALITY, UNSPECIFIED TRIGGER: ICD-10-CM

## 2022-05-11 RX ORDER — MONTELUKAST SODIUM 10 MG/1
TABLET ORAL
Qty: 90 TABLET | Refills: 3 | Status: SHIPPED | OUTPATIENT
Start: 2022-05-11

## 2022-05-11 RX ORDER — PANTOPRAZOLE SODIUM 40 MG/1
TABLET, DELAYED RELEASE ORAL
Qty: 30 TABLET | Refills: 0 | Status: SHIPPED | OUTPATIENT
Start: 2022-05-11 | End: 2022-06-14

## 2022-06-14 RX ORDER — PANTOPRAZOLE SODIUM 40 MG/1
TABLET, DELAYED RELEASE ORAL
Qty: 30 TABLET | Refills: 3 | Status: SHIPPED | OUTPATIENT
Start: 2022-06-14 | End: 2022-09-30 | Stop reason: SDUPTHER

## 2022-07-06 ENCOUNTER — OFFICE VISIT (OUTPATIENT)
Dept: PRIMARY CARE CLINIC | Age: 67
End: 2022-07-06
Payer: COMMERCIAL

## 2022-07-06 VITALS
RESPIRATION RATE: 18 BRPM | BODY MASS INDEX: 28.28 KG/M2 | TEMPERATURE: 97.4 F | WEIGHT: 159.6 LBS | HEART RATE: 79 BPM | HEIGHT: 63 IN | OXYGEN SATURATION: 99 % | DIASTOLIC BLOOD PRESSURE: 66 MMHG | SYSTOLIC BLOOD PRESSURE: 122 MMHG

## 2022-07-06 DIAGNOSIS — R53.83 FATIGUE, UNSPECIFIED TYPE: Primary | ICD-10-CM

## 2022-07-06 DIAGNOSIS — R52 GENERALIZED BODY ACHES: ICD-10-CM

## 2022-07-06 DIAGNOSIS — R53.83 FATIGUE, UNSPECIFIED TYPE: ICD-10-CM

## 2022-07-06 LAB
ALBUMIN SERPL-MCNC: 4.7 G/DL (ref 3.5–4.6)
ALP BLD-CCNC: 49 U/L (ref 40–130)
ALT SERPL-CCNC: 23 U/L (ref 0–33)
ANION GAP SERPL CALCULATED.3IONS-SCNC: 15 MEQ/L (ref 9–15)
AST SERPL-CCNC: 19 U/L (ref 0–35)
BASOPHILS ABSOLUTE: 0 K/UL (ref 0–0.2)
BASOPHILS RELATIVE PERCENT: 0.3 %
BILIRUB SERPL-MCNC: 1.2 MG/DL (ref 0.2–0.7)
BUN BLDV-MCNC: 18 MG/DL (ref 8–23)
CALCIUM SERPL-MCNC: 9.9 MG/DL (ref 8.5–9.9)
CHLORIDE BLD-SCNC: 96 MEQ/L (ref 95–107)
CO2: 27 MEQ/L (ref 20–31)
CREAT SERPL-MCNC: 0.8 MG/DL (ref 0.5–0.9)
EOSINOPHILS ABSOLUTE: 0.2 K/UL (ref 0–0.7)
EOSINOPHILS RELATIVE PERCENT: 2.3 %
GFR AFRICAN AMERICAN: >60
GFR NON-AFRICAN AMERICAN: >60
GLOBULIN: 2.4 G/DL (ref 2.3–3.5)
GLUCOSE BLD-MCNC: 91 MG/DL (ref 70–99)
HCT VFR BLD CALC: 42.8 % (ref 37–47)
HEMOGLOBIN: 14.4 G/DL (ref 12–16)
LYMPHOCYTES ABSOLUTE: 2.2 K/UL (ref 1–4.8)
LYMPHOCYTES RELATIVE PERCENT: 33.1 %
MCH RBC QN AUTO: 32.1 PG (ref 27–31.3)
MCHC RBC AUTO-ENTMCNC: 33.7 % (ref 33–37)
MCV RBC AUTO: 95.2 FL (ref 82–100)
MONOCYTES ABSOLUTE: 0.6 K/UL (ref 0.2–0.8)
MONOCYTES RELATIVE PERCENT: 9.6 %
NEUTROPHILS ABSOLUTE: 3.7 K/UL (ref 1.4–6.5)
NEUTROPHILS RELATIVE PERCENT: 54.7 %
PDW BLD-RTO: 13.1 % (ref 11.5–14.5)
PLATELET # BLD: 262 K/UL (ref 130–400)
POTASSIUM SERPL-SCNC: 3.5 MEQ/L (ref 3.4–4.9)
RBC # BLD: 4.49 M/UL (ref 4.2–5.4)
SODIUM BLD-SCNC: 138 MEQ/L (ref 135–144)
TOTAL PROTEIN: 7.1 G/DL (ref 6.3–8)
VITAMIN D 25-HYDROXY: 34.4 NG/ML
WBC # BLD: 6.7 K/UL (ref 4.8–10.8)

## 2022-07-06 PROCEDURE — 99214 OFFICE O/P EST MOD 30 MIN: CPT | Performed by: INTERNAL MEDICINE

## 2022-07-06 PROCEDURE — 1123F ACP DISCUSS/DSCN MKR DOCD: CPT | Performed by: INTERNAL MEDICINE

## 2022-07-06 RX ORDER — ESTRADIOL 0.1 MG/G
CREAM VAGINAL
COMMUNITY
Start: 2022-06-22

## 2022-07-06 ASSESSMENT — ENCOUNTER SYMPTOMS
RHINORRHEA: 0
WHEEZING: 0
SORE THROAT: 0
NAUSEA: 0
SHORTNESS OF BREATH: 0
SINUS PRESSURE: 1
SINUS PAIN: 0
VOMITING: 0
DIARRHEA: 0
COUGH: 1
ABDOMINAL PAIN: 0

## 2022-07-06 NOTE — PROGRESS NOTES
Subjective:      Patient ID: Servando Madrid is a 78 y.o. female    Weakness, chills x 1 week  HPI  Pt presents with 1 week of sudden onset tremors, chills, generalized body pain. Assoc dry cough, runnynose. Assoc abd pain and headache. NO SOB or wheezing. Assoc loss of taste and smell. Advised this is suggestive of COVID. Declines COVID testing. Past Medical History:   Diagnosis Date    Anxiety     Appendicitis     Chronic sinusitis 2017    Depression     DNS (deviated nasal septum) 2017    Hyperlipidemia     Hypertension     Hypertrophy of nasal turbinates 2017    Kidney stones     Osteoarthritis     Seasonal allergies      Past Surgical History:   Procedure Laterality Date    COSMETIC SURGERY      KIDNEY STONE SURGERY      LUMBAR LAMINECTOMY      ROTATOR CUFF REPAIR  2016    DR. DUNCAN    SINUS ENDOSCOPY N/A 8/10/2017    SEPTOPLASTY MICRODEBRIDER ASSISTED TURBINOPLASTY AND OUT-FRACTURING BILATERAL MAXILLARY BALLOON SINUPLASTY RIGHT FRONTAL BALLOON SINUPLASTY INTRANASAL ETHMOIDECTOMY  performed by Kitty Handley MD at [address] [place] [address]      TUBAL LIGATION       Social History     Socioeconomic History    Marital status:      Spouse name: Not on file    Number of children: Not on file    Years of education: Not on file    Highest education level: Not on file   Occupational History    Not on file   Tobacco Use    Smoking status: Former Smoker     Packs/day: 2.00     Years: 22.00     Pack years: 44.00     Start date:      Quit date: 1993     Years since quittin.5    Smokeless tobacco: Never Used   Vaping Use    Vaping Use: Never used   Substance and Sexual Activity    Alcohol use: Yes     Alcohol/week: 0.0 standard drinks     Comment: occ.     Drug use: No    Sexual activity: Yes     Partners: Male   Other Topics Concern    Not on file   Social History Narrative    Not on file     Social Determinants of Health Financial Resource Strain: Low Risk     Difficulty of Paying Living Expenses: Not hard at all   Food Insecurity: No Food Insecurity    Worried About Running Out of Food in the Last Year: Never true    P[place] of Food in the Last Year: Never true   Transportation Needs: No Transportation Needs    Lack of Transportation (Medical): No    Lack of Transportation (Non-Medical): No   Physical Activity:     Days of Exercise per Week: Not on file    Minutes of Exercise per Session: Not on file   Stress:     Feeling of Stress : Not on file   Social Connections:     Frequency of Communication with Friends and Family: Not on file    Frequency of Social Gatherings with Friends and Family: Not on file    Attends Jehovah's witness Services: Not on file    Active Member of [address] [place] [address] [employer] or Organizations: Not on file    Attends Club or Organization Meetings: Not on file    Marital Status: Not on file   Intimate Partner Violence:     Fear of Current or Ex-Partner: Not on file    Emotionally Abused: Not on file    Physically Abused: Not on file    Sexually Abused: Not on file   Housing Stability:     Unable to Pay for Housing in the Last Year: Not on file    Number of [place] in the Last Year: Not on file    Unstable Housing in the Last Year: Not on file     Family History   Problem Relation Age of Onset    High Blood Pressure Mother     High Cholesterol Mother     High Blood Pressure Father     High Cholesterol Father     No Known Problems Daughter     No Known Problems Son      Allergies:  Patient has no known allergies.   Patient Active Problem List   Diagnosis    Anxiety    Depression    Primary osteoarthritis involving multiple joints    Mixed hyperlipidemia    Essential hypertension    Seasonal allergies    Kidney stones    Appendicitis    Palpitations    Spondylosis of lumbar region without myelopathy or radiculopathy    Sacroiliitis ([address]    DDD (degenerative disc disease), lumbar    Cervical spondylosis without myelopathy    Chronic sinusitis    DNS (deviated nasal septum)    Hypertrophy of nasal turbinates    Gastroesophageal reflux disease without esophagitis    Left foot pain     Current Outpatient Medications on File Prior to Visit   Medication Sig Dispense Refill    estradiol (ESTRACE) 0.1 MG/GM vaginal cream       pantoprazole (PROTONIX) 40 MG tablet Take 1 tablet by mouth once daily 30 tablet 3    montelukast (SINGULAIR) 10 MG tablet Take 1 tablet by mouth once daily 90 tablet 3    FLUoxetine (PROZAC) 20 MG capsule Take 1 capsule by mouth once daily 60 capsule 3    polyethylene glycol (GLYCOLAX) 17 GM/SCOOP powder TAKE 10 GRAMS BY MOUTH ONCE DAILY. MIX IT WITH LIQUID AND DRINK 510 g 3    FLUoxetine (PROZAC) 10 MG capsule TAKE 1 CAPSULE BY MOUTH ONCE DAILY IN  ADDITION  TO  20MG  CAP  FOR  A  TOTAL  OF  30MG  DAILY 90 capsule 1    hydroCHLOROthiazide (HYDRODIURIL) 25 MG tablet Take 1 tablet by mouth once daily 90 tablet 3    Psyllium (METAMUCIL FIBER PO) Take by mouth      mometasone (NASONEX) 50 MCG/ACT nasal spray USE 1 SPRAY(S) IN EACH NOSTRIL TWICE DAILY      levocetirizine (XYZAL) 5 MG tablet Take 1 tablet by mouth nightly 30 tablet 5    solifenacin (VESICARE) 10 MG tablet Take 1 tablet by mouth daily 30 tablet 0    sucralfate (CARAFATE) 1 GM tablet Take 1 tablet by mouth 4 times daily (Patient not taking: Reported on 3/15/2022) 120 tablet 3    famotidine (PEPCID) 20 MG tablet Take 1 tablet by mouth 2 times daily (Patient not taking: Reported on 3/15/2022) 60 tablet 3    activated vegetable charcoal (CHARCOCAPS) 260 MG capsule Take 1 capsule 4 times a day as needed 120 capsule 3     No current facility-administered medications on file prior to visit. Review of Systems   Constitutional: Negative for chills, diaphoresis, fatigue and fever. HENT: Positive for congestion and sinus pressure.  Negative for ear discharge, ear pain, rhinorrhea, sinus pain, sneezing and sore throat. Respiratory: Positive for cough. Negative for shortness of breath and wheezing. Cardiovascular: Negative for chest pain. Gastrointestinal: Negative for abdominal pain, diarrhea, nausea and vomiting. Endocrine: Negative for cold intolerance and heat intolerance. Genitourinary: Negative for dysuria and frequency. Neurological: Negative for dizziness and light-headedness. Objective:   /66   Pulse 79   Temp 97.4 °F (36.3 °C)   Resp 18   Ht 5' 3\" (1.6 m)   Wt 159 lb 9.6 oz (72.4 kg)   LMP 01/15/2006   SpO2 99%   BMI 28.27 kg/m²     Physical Exam  Constitutional:       General: She is not in acute distress. Appearance: She is not diaphoretic. Cardiovascular:      Rate and Rhythm: Normal rate and regular rhythm. Pulses: Normal pulses. Heart sounds: Normal heart sounds, S1 normal and S2 normal.   Pulmonary:      Effort: Pulmonary effort is normal. No respiratory distress. Breath sounds: Normal breath sounds. No wheezing or rales. Chest:      Chest wall: No tenderness. Abdominal:      Tenderness: There is no abdominal tenderness. Assessment:       Diagnosis Orders   1. Fatigue, unspecified type  CBC with Auto Differential    Comprehensive Metabolic Panel    Vitamin D 25 Hydroxy   2. Generalized body aches  CBC with Auto Differential    Comprehensive Metabolic Panel    Vitamin D 25 Hydroxy    declines COVID testing      Plan:      Orders Placed This Encounter   Procedures    CBC with Auto Differential     Standing Status:   Future     Number of Occurrences:   1     Standing Expiration Date:   7/6/2023    Comprehensive Metabolic Panel     Standing Status:   Future     Number of Occurrences:   1     Standing Expiration Date:   7/6/2023    Vitamin D 25 Hydroxy     Standing Status:   Future     Number of Occurrences:   1     Standing Expiration Date:   7/6/2023     No orders of the defined types were placed in this encounter.   Rest, OTC APAP/ibuprofen and fluid intake recommended. Return if symptoms worsen or fail to improve.

## 2022-07-10 DIAGNOSIS — E80.6 BILIRUBINEMIA: Primary | ICD-10-CM

## 2022-07-12 ENCOUNTER — TELEPHONE (OUTPATIENT)
Dept: PRIMARY CARE CLINIC | Age: 67
End: 2022-07-12

## 2022-07-12 DIAGNOSIS — F43.23 ADJUSTMENT DISORDER WITH MIXED ANXIETY AND DEPRESSED MOOD: Primary | ICD-10-CM

## 2022-07-12 RX ORDER — BUSPIRONE HYDROCHLORIDE 7.5 MG/1
7.5 TABLET ORAL 3 TIMES DAILY PRN
Qty: 60 TABLET | Refills: 1 | Status: SHIPPED | OUTPATIENT
Start: 2022-07-12 | End: 2022-08-11

## 2022-07-13 DIAGNOSIS — E80.6 BILIRUBINEMIA: ICD-10-CM

## 2022-07-13 LAB
BILIRUB SERPL-MCNC: 0.7 MG/DL (ref 0.2–0.7)
BILIRUBIN DIRECT: <0.2 MG/DL (ref 0–0.4)
BILIRUBIN, INDIRECT: NORMAL MG/DL (ref 0–0.6)

## 2022-09-13 DIAGNOSIS — F43.23 ADJUSTMENT DISORDER WITH MIXED ANXIETY AND DEPRESSED MOOD: ICD-10-CM

## 2022-09-13 RX ORDER — FLUOXETINE 10 MG/1
CAPSULE ORAL
Qty: 90 CAPSULE | Refills: 0 | Status: SHIPPED | OUTPATIENT
Start: 2022-09-13

## 2022-09-17 DIAGNOSIS — J32.9 SINUSITIS, UNSPECIFIED CHRONICITY, UNSPECIFIED LOCATION: Primary | ICD-10-CM

## 2022-09-17 DIAGNOSIS — E78.2 MIXED HYPERLIPIDEMIA: ICD-10-CM

## 2022-09-17 RX ORDER — AMOXICILLIN 500 MG/1
500 CAPSULE ORAL 3 TIMES DAILY
Qty: 21 CAPSULE | Refills: 0 | Status: SHIPPED | OUTPATIENT
Start: 2022-09-17 | End: 2022-09-24

## 2022-09-30 ENCOUNTER — OFFICE VISIT (OUTPATIENT)
Dept: PRIMARY CARE CLINIC | Age: 67
End: 2022-09-30
Payer: COMMERCIAL

## 2022-09-30 VITALS
BODY MASS INDEX: 28.31 KG/M2 | WEIGHT: 159.8 LBS | OXYGEN SATURATION: 96 % | RESPIRATION RATE: 18 BRPM | HEIGHT: 63 IN | SYSTOLIC BLOOD PRESSURE: 116 MMHG | TEMPERATURE: 97.7 F | DIASTOLIC BLOOD PRESSURE: 70 MMHG | HEART RATE: 84 BPM

## 2022-09-30 DIAGNOSIS — K29.00 ACUTE SUPERFICIAL GASTRITIS WITHOUT HEMORRHAGE: ICD-10-CM

## 2022-09-30 DIAGNOSIS — R26.89 IMBALANCE: ICD-10-CM

## 2022-09-30 DIAGNOSIS — R35.0 FREQUENCY OF URINATION: ICD-10-CM

## 2022-09-30 DIAGNOSIS — J01.40 SUBACUTE PANSINUSITIS: Primary | ICD-10-CM

## 2022-09-30 LAB
BACTERIA: NEGATIVE /HPF
BILIRUBIN URINE: NEGATIVE
BLOOD, URINE: NEGATIVE
CLARITY: CLEAR
COLOR: YELLOW
EPITHELIAL CELLS, UA: NORMAL /HPF (ref 0–5)
GLUCOSE URINE: NEGATIVE MG/DL
HYALINE CASTS: NORMAL /HPF (ref 0–5)
KETONES, URINE: NEGATIVE MG/DL
LEUKOCYTE ESTERASE, URINE: ABNORMAL
NITRITE, URINE: NEGATIVE
PH UA: 5.5 (ref 5–9)
PROTEIN UA: NEGATIVE MG/DL
RBC UA: NORMAL /HPF (ref 0–5)
SPECIFIC GRAVITY UA: 1.01 (ref 1–1.03)
UROBILINOGEN, URINE: 0.2 E.U./DL
WBC UA: NORMAL /HPF (ref 0–5)

## 2022-09-30 PROCEDURE — 1123F ACP DISCUSS/DSCN MKR DOCD: CPT | Performed by: INTERNAL MEDICINE

## 2022-09-30 PROCEDURE — 99214 OFFICE O/P EST MOD 30 MIN: CPT | Performed by: INTERNAL MEDICINE

## 2022-09-30 RX ORDER — METHYLPREDNISOLONE 4 MG/1
TABLET ORAL
Qty: 1 KIT | Refills: 0 | Status: SHIPPED | OUTPATIENT
Start: 2022-09-30

## 2022-09-30 RX ORDER — BUSPIRONE HYDROCHLORIDE 7.5 MG/1
TABLET ORAL
COMMUNITY
Start: 2022-09-04

## 2022-09-30 RX ORDER — MECLIZINE HYDROCHLORIDE 25 MG/1
25 TABLET ORAL 3 TIMES DAILY PRN
Qty: 15 TABLET | Refills: 0 | Status: SHIPPED | OUTPATIENT
Start: 2022-09-30

## 2022-09-30 RX ORDER — PANTOPRAZOLE SODIUM 40 MG/1
TABLET, DELAYED RELEASE ORAL
Qty: 30 TABLET | Refills: 0 | OUTPATIENT
Start: 2022-09-30

## 2022-09-30 RX ORDER — PANTOPRAZOLE SODIUM 40 MG/1
40 TABLET, DELAYED RELEASE ORAL 2 TIMES DAILY
Qty: 60 TABLET | Refills: 3 | Status: SHIPPED | OUTPATIENT
Start: 2022-09-30

## 2022-09-30 RX ORDER — LEVOFLOXACIN 750 MG/1
750 TABLET ORAL DAILY
Qty: 5 TABLET | Refills: 0 | Status: SHIPPED | OUTPATIENT
Start: 2022-09-30 | End: 2022-10-05

## 2022-09-30 ASSESSMENT — ENCOUNTER SYMPTOMS
COUGH: 1
VOMITING: 0
NAUSEA: 0
DIARRHEA: 0
SHORTNESS OF BREATH: 0
SINUS PRESSURE: 1
WHEEZING: 0
ABDOMINAL PAIN: 1
SINUS PAIN: 1

## 2022-09-30 NOTE — PROGRESS NOTES
Subjective:      Patient ID: Debbie Galarza is a 78 y.o. female    Facial pain x 4  weeks   HPI  Pt presents with 4 weeks of  sinus congestion and facial pain. There's been assoc dry cough, no wheezing. One episode  of , SOB with exertion yesterday-now resolved. Assoc right ear pain, no sore throat. No known sick or COVID contacts. Never vaccinated against COVID. No improvement on amoxicillin. Assoc imbalance and spinning sensation. No slurred or weakness. Not a vegetarian. Epigastric abd pain has been intermittent despite PPI, per GI. Hx H pylori infection. No bloody or black stools. Past Medical History:   Diagnosis Date    Anxiety     Appendicitis     Chronic sinusitis 2017    Depression     DNS (deviated nasal septum) 2017    Hyperlipidemia     Hypertension     Hypertrophy of nasal turbinates 2017    Kidney stones     Osteoarthritis     Seasonal allergies      Past Surgical History:   Procedure Laterality Date    COSMETIC SURGERY      KIDNEY STONE SURGERY      LUMBAR LAMINECTOMY      ROTATOR CUFF REPAIR  2016    DR. DUNCAN    SINUS ENDOSCOPY N/A 8/10/2017    SEPTOPLASTY MICRODEBRIDER ASSISTED TURBINOPLASTY AND OUT-FRACTURING BILATERAL MAXILLARY BALLOON SINUPLASTY RIGHT FRONTAL BALLOON SINUPLASTY INTRANASAL ETHMOIDECTOMY  performed by Haley Fry MD at [address] [facility]       Social History     Socioeconomic History    Marital status:      Spouse name: Not on file    Number of children: Not on file    Years of education: Not on file    Highest education level: Not on file   Occupational History    Not on file   Tobacco Use    Smoking status: Former     Packs/day: 2.00     Years: 22.00     Pack years: 44.00     Types: Cigarettes     Start date:      Quit date: 1993     Years since quittin.7    Smokeless tobacco: Never   Vaping Use    Vaping Use: Never used   Substance and Sexual Activity    Alcohol use:  Yes Alcohol/week: 0.0 standard drinks     Comment: occ. Drug use: No    Sexual activity: Yes     Partners: Male   Other Topics Concern    Not on file   Social History Narrative    Not on file     Social Determinants of Health     Financial Resource Strain: Low Risk     Difficulty of Paying Living Expenses: Not hard at all   Food Insecurity: No Food Insecurity    Worried About [address] in the Last Year: Never true    [address] [employer] in the Last Year: Never true   Transportation Needs: No Transportation Needs    Lack of Transportation (Medical): No    Lack of Transportation (Non-Medical): No   Physical Activity: Not on file   Stress: Not on file   Social Connections: Not on file   Intimate Partner Violence: Not on file   Housing Stability: Not on file     Family History   Problem Relation Age of Onset    High Blood Pressure Mother     High Cholesterol Mother     High Blood Pressure Father     High Cholesterol Father     No Known Problems Daughter     No Known Problems Son    Allergies:  Patient has no known allergies.   Patient Active Problem List   Diagnosis    Anxiety    Depression    Primary osteoarthritis involving multiple joints    Mixed hyperlipidemia    Essential hypertension    Seasonal allergies    Kidney stones    Appendicitis    Palpitations    Spondylosis of lumbar region without myelopathy or radiculopathy    Sacroiliitis (HCC)    DDD (degenerative disc disease), lumbar    Cervical spondylosis without myelopathy    Chronic sinusitis    DNS (deviated nasal septum)    Hypertrophy of nasal turbinates    Gastroesophageal reflux disease without esophagitis    Left foot pain     Current Outpatient Medications on File Prior to Visit   Medication Sig Dispense Refill    FLUoxetine (PROZAC) 10 MG capsule TAKE 1 CAPSULE BY MOUTH ONCE DAILY IN  ADDITION  TO  20MG  CAP  FOR  A  TOTAL  OF  30MG  DAILY 90 capsule 0    estradiol (ESTRACE) 0.1 MG/GM vaginal cream       montelukast (SINGULAIR) 10 MG tablet Take 1 tablet by mouth once daily 90 tablet 3    FLUoxetine (PROZAC) 20 MG capsule Take 1 capsule by mouth once daily 60 capsule 3    polyethylene glycol (GLYCOLAX) 17 GM/SCOOP powder TAKE 10 GRAMS BY MOUTH ONCE DAILY. MIX IT WITH LIQUID AND DRINK 510 g 3    hydroCHLOROthiazide (HYDRODIURIL) 25 MG tablet Take 1 tablet by mouth once daily 90 tablet 3    sucralfate (CARAFATE) 1 GM tablet Take 1 tablet by mouth 4 times daily 120 tablet 3    Psyllium (METAMUCIL FIBER PO) Take by mouth      mometasone (NASONEX) 50 MCG/ACT nasal spray USE 1 SPRAY(S) IN EACH NOSTRIL TWICE DAILY      levocetirizine (XYZAL) 5 MG tablet Take 1 tablet by mouth nightly 30 tablet 5    busPIRone (BUSPAR) 7.5 MG tablet TAKE 1 TABLET BY MOUTH THREE TIMES DAILY AS NEEDED FOR ANXIETY      solifenacin (VESICARE) 10 MG tablet Take 1 tablet by mouth daily 30 tablet 0    activated vegetable charcoal (CHARCOCAPS) 260 MG capsule Take 1 capsule 4 times a day as needed 120 capsule 3     No current facility-administered medications on file prior to visit. Review of Systems   Constitutional:  Negative for chills, diaphoresis, fatigue and fever. HENT:  Positive for ear pain, sinus pressure, sinus pain and sneezing. Negative for congestion and ear discharge. Respiratory:  Positive for cough. Negative for shortness of breath and wheezing. Cardiovascular:  Negative for chest pain. Gastrointestinal:  Positive for abdominal pain. Negative for diarrhea, nausea and vomiting. Endocrine: Negative for cold intolerance and heat intolerance. Genitourinary:  Negative for dysuria and frequency. Neurological:  Positive for dizziness and light-headedness. Negative for numbness. Objective:   /70   Pulse 84   Temp 97.7 °F (36.5 °C)   Resp 18   Ht 5' 3\" (1.6 m)   Wt 159 lb 12.8 oz (72.5 kg)   LMP 01/15/2006   SpO2 96%   BMI 28.31 kg/m²     Physical Exam  Constitutional:       General: She is not in acute distress.      Appearance: She is not diaphoretic. Cardiovascular:      Rate and Rhythm: Normal rate and regular rhythm. Pulses: Normal pulses. Heart sounds: Normal heart sounds, S1 normal and S2 normal.   Pulmonary:      Effort: Pulmonary effort is normal. No respiratory distress. Breath sounds: Normal breath sounds. No wheezing or rales. Chest:      Chest wall: No tenderness. Abdominal:      General: Bowel sounds are normal.      Tenderness: There is abdominal tenderness (epigastric TTP). Neurological:      Mental Status: She is alert. Assessment:       Diagnosis Orders   1. Subacute pansinusitis  levoFLOXacin (LEVAQUIN) 750 MG tablet      2. Imbalance  meclizine (ANTIVERT) 25 MG tablet    Vitamin B12    Methylmalonic Acid, Serum      3. Frequency of urination  Urinalysis      4. Acute superficial gastritis without hemorrhage  pantoprazole (PROTONIX) 40 MG tablet    hx H pylori infection         Plan:      Orders Placed This Encounter   Procedures    Vitamin B12     Standing Status:   Future     Number of Occurrences:   1     Standing Expiration Date:   9/30/2023    Methylmalonic Acid, Serum     Standing Status:   Future     Number of Occurrences:   1     Standing Expiration Date:   9/30/2023    Urinalysis     Standing Status:   Future     Number of Occurrences:   1     Standing Expiration Date:   9/30/2023   Will see GI ASAP for H pulor testing  Return in about 1 month (around 10/30/2022) for follow up, with PCP.

## 2022-10-01 LAB — VITAMIN B-12: 457 PG/ML (ref 232–1245)

## 2022-10-06 DIAGNOSIS — R42 DIZZINESS: Primary | ICD-10-CM

## 2022-10-06 DIAGNOSIS — R26.89 IMBALANCE: ICD-10-CM

## 2022-10-06 DIAGNOSIS — R51.9 FACIAL PAIN: ICD-10-CM

## 2022-10-07 LAB — METHYLMALONIC ACID: 0.22 UMOL/L (ref 0–0.4)

## 2022-11-08 RX ORDER — POLYETHYLENE GLYCOL 3350 17 G/17G
POWDER, FOR SOLUTION ORAL
Qty: 510 G | Refills: 0 | Status: SHIPPED | OUTPATIENT
Start: 2022-11-08

## 2022-11-08 RX ORDER — BUSPIRONE HYDROCHLORIDE 7.5 MG/1
TABLET ORAL
Qty: 60 TABLET | Refills: 2 | Status: SHIPPED | OUTPATIENT
Start: 2022-11-08

## 2022-11-27 DIAGNOSIS — N95.1 VASOMOTOR SYMPTOMS DUE TO MENOPAUSE: ICD-10-CM

## 2022-12-04 DIAGNOSIS — R35.0 FREQUENCY OF URINATION: Primary | ICD-10-CM

## 2022-12-04 RX ORDER — FLUOXETINE HYDROCHLORIDE 20 MG/1
CAPSULE ORAL
Qty: 60 CAPSULE | Refills: 1 | Status: SHIPPED | OUTPATIENT
Start: 2022-12-04

## 2022-12-06 ENCOUNTER — NURSE TRIAGE (OUTPATIENT)
Dept: OTHER | Facility: CLINIC | Age: 67
End: 2022-12-06

## 2022-12-06 ENCOUNTER — OFFICE VISIT (OUTPATIENT)
Dept: PRIMARY CARE CLINIC | Age: 67
End: 2022-12-06
Payer: COMMERCIAL

## 2022-12-06 VITALS
HEART RATE: 93 BPM | TEMPERATURE: 97.3 F | SYSTOLIC BLOOD PRESSURE: 120 MMHG | DIASTOLIC BLOOD PRESSURE: 62 MMHG | WEIGHT: 153.8 LBS | HEIGHT: 63 IN | OXYGEN SATURATION: 97 % | RESPIRATION RATE: 18 BRPM | BODY MASS INDEX: 27.25 KG/M2

## 2022-12-06 DIAGNOSIS — E78.2 MIXED HYPERLIPIDEMIA: ICD-10-CM

## 2022-12-06 DIAGNOSIS — R35.0 FREQUENCY OF URINATION: ICD-10-CM

## 2022-12-06 DIAGNOSIS — U07.1 COVID-19: Primary | ICD-10-CM

## 2022-12-06 DIAGNOSIS — N39.0 URINARY TRACT INFECTION WITH HEMATURIA, SITE UNSPECIFIED: Primary | ICD-10-CM

## 2022-12-06 DIAGNOSIS — R31.9 URINARY TRACT INFECTION WITH HEMATURIA, SITE UNSPECIFIED: Primary | ICD-10-CM

## 2022-12-06 DIAGNOSIS — J10.1 INFLUENZA A: ICD-10-CM

## 2022-12-06 LAB
BACTERIA: ABNORMAL /HPF
BILIRUBIN URINE: ABNORMAL
BLOOD, URINE: NEGATIVE
CHOLESTEROL, FASTING: 275 MG/DL (ref 0–199)
CLARITY: ABNORMAL
COLOR: ABNORMAL
EPITHELIAL CELLS, UA: ABNORMAL /HPF (ref 0–5)
GLUCOSE URINE: NEGATIVE MG/DL
HDLC SERPL-MCNC: 59 MG/DL (ref 40–59)
INFLUENZA A ANTIBODY: POSITIVE
INFLUENZA B ANTIBODY: NEGATIVE
KETONES, URINE: ABNORMAL MG/DL
LDL CHOLESTEROL CALCULATED: 192 MG/DL (ref 0–129)
LEUKOCYTE ESTERASE, URINE: ABNORMAL
Lab: ABNORMAL
NITRITE, URINE: NEGATIVE
PERFORMING INSTRUMENT: ABNORMAL
PH UA: 6 (ref 5–9)
PROTEIN UA: ABNORMAL MG/DL
QC PASS/FAIL: ABNORMAL
RBC UA: ABNORMAL /HPF (ref 0–2)
RSV ANTIGEN: NEGATIVE
S PYO AG THROAT QL: NORMAL
SARS-COV-2, POC: DETECTED
SPECIFIC GRAVITY UA: 1.02 (ref 1–1.03)
TRIGLYCERIDE, FASTING: 121 MG/DL (ref 0–150)
UROBILINOGEN, URINE: 0.2 E.U./DL
WBC UA: ABNORMAL /HPF (ref 0–5)

## 2022-12-06 PROCEDURE — 86756 RESPIRATORY VIRUS ANTIBODY: CPT | Performed by: INTERNAL MEDICINE

## 2022-12-06 PROCEDURE — 3074F SYST BP LT 130 MM HG: CPT | Performed by: INTERNAL MEDICINE

## 2022-12-06 PROCEDURE — 87804 INFLUENZA ASSAY W/OPTIC: CPT | Performed by: INTERNAL MEDICINE

## 2022-12-06 PROCEDURE — 1123F ACP DISCUSS/DSCN MKR DOCD: CPT | Performed by: INTERNAL MEDICINE

## 2022-12-06 PROCEDURE — 3078F DIAST BP <80 MM HG: CPT | Performed by: INTERNAL MEDICINE

## 2022-12-06 PROCEDURE — 87880 STREP A ASSAY W/OPTIC: CPT | Performed by: INTERNAL MEDICINE

## 2022-12-06 PROCEDURE — 87426 SARSCOV CORONAVIRUS AG IA: CPT | Performed by: INTERNAL MEDICINE

## 2022-12-06 PROCEDURE — 99214 OFFICE O/P EST MOD 30 MIN: CPT | Performed by: INTERNAL MEDICINE

## 2022-12-06 RX ORDER — BENZONATATE 100 MG/1
100-200 CAPSULE ORAL 3 TIMES DAILY PRN
Qty: 60 CAPSULE | Refills: 0 | Status: SHIPPED | OUTPATIENT
Start: 2022-12-06 | End: 2022-12-13

## 2022-12-06 RX ORDER — OSELTAMIVIR PHOSPHATE 75 MG/1
75 CAPSULE ORAL 2 TIMES DAILY
Qty: 10 CAPSULE | Refills: 0 | Status: SHIPPED | OUTPATIENT
Start: 2022-12-06 | End: 2022-12-11

## 2022-12-06 RX ORDER — CIPROFLOXACIN 500 MG/1
500 TABLET, FILM COATED ORAL 2 TIMES DAILY
Qty: 14 TABLET | Refills: 0 | Status: SHIPPED | OUTPATIENT
Start: 2022-12-06 | End: 2022-12-13

## 2022-12-06 RX ORDER — AZELASTINE 1 MG/ML
SPRAY, METERED NASAL
COMMUNITY
Start: 2022-11-01

## 2022-12-06 ASSESSMENT — ENCOUNTER SYMPTOMS
WHEEZING: 0
NAUSEA: 1
SORE THROAT: 1
DIARRHEA: 0
VOMITING: 1
SINUS PRESSURE: 1
ABDOMINAL PAIN: 0
SINUS PAIN: 1
COUGH: 1
RHINORRHEA: 0
SHORTNESS OF BREATH: 0

## 2022-12-06 NOTE — PROGRESS NOTES
Subjective:      Patient ID: Mariel Pack is a 78 y.o. female    Cough x 4 days   HPI  Pt presents with  4 days of dry cough. No assoc chest pain or chest congestion. Assoc sinus congestion and headache. No SOB or wheezing, no fever but had chills. No generalized body aches. Assoc sore throat, no nausea or vomiting.   has similar symptoms. Never vaccinated against COVID. Assoc loss of appetite, no nasuea or vomiting. Assoc non bloody watery diarrhea-now resolved. Initially declined testing for COVID, RSV, flu and strep due to lack of trust in the system. However she had a change of mind after I explained the tests are reliable and she would benefit from testing and targeted treatment for each condition, if positive. Past Medical History:   Diagnosis Date    Anxiety     Appendicitis     Chronic sinusitis 8/7/2017    Depression     DNS (deviated nasal septum) 8/7/2017    Hyperlipidemia     Hypertension     Hypertrophy of nasal turbinates 8/7/2017    Kidney stones     Osteoarthritis     Seasonal allergies      Past Surgical History:   Procedure Laterality Date    COSMETIC SURGERY      KIDNEY STONE SURGERY      LUMBAR LAMINECTOMY      ROTATOR CUFF REPAIR  07/25/2016    DR. DUNCAN    SINUS ENDOSCOPY N/A 8/10/2017    SEPTOPLASTY MICRODEBRIDER ASSISTED TURBINOPLASTY AND OUT-FRACTURING BILATERAL MAXILLARY BALLOON SINUPLASTY RIGHT FRONTAL BALLOON SINUPLASTY INTRANASAL ETHMOIDECTOMY  performed by Christine Lopez MD at [address] [facility]       Social History     Socioeconomic History    Marital status:      Spouse name: Not on file    Number of children: Not on file    Years of education: Not on file    Highest education level: Not on file   Occupational History    Not on file   Tobacco Use    Smoking status: Former     Packs/day: 2.00     Years: 22.00     Pack years: 44.00     Types: Cigarettes     Start date: 27     Quit date: 1/1/1993     Years since quittin.9    Smokeless tobacco: Never   Vaping Use    Vaping Use: Never used   Substance and Sexual Activity    Alcohol use: Yes     Alcohol/week: 0.0 standard drinks     Comment: occ. Drug use: No    Sexual activity: Yes     Partners: Male   Other Topics Concern    Not on file   Social History Narrative    Not on file     Social Determinants of Health     Financial Resource Strain: Low Risk     Difficulty of Paying Living Expenses: Not hard at all   Food Insecurity: No Food Insecurity    Worried About [address] in the Last Year: Never true    [address] [facility] [employer] in the Last Year: Never true   Transportation Needs: No Transportation Needs    Lack of Transportation (Medical): No    Lack of Transportation (Non-Medical):  No   Physical Activity: Not on file   Stress: Not on file   Social Connections: Not on file   Intimate Partner Violence: Not on file   Housing Stability: Not on file     Family History   Problem Relation Age of Onset    High Blood Pressure Mother     High Cholesterol Mother     High Blood Pressure Father     High Cholesterol Father     No Known Problems Daughter     No Known Problems Son      Allergies:  Levonorgestrel-ethinyl estrad  Patient Active Problem List   Diagnosis    Anxiety    Depression    Primary osteoarthritis involving multiple joints    Mixed hyperlipidemia    Essential hypertension    Seasonal allergies    Kidney stones    Appendicitis    Palpitations    Spondylosis of lumbar region without myelopathy or radiculopathy    Sacroiliitis (HCC)    DDD (degenerative disc disease), lumbar    Cervical spondylosis without myelopathy    Chronic sinusitis    DNS (deviated nasal septum)    Hypertrophy of nasal turbinates    Gastroesophageal reflux disease without esophagitis    Left foot pain     Current Outpatient Medications on File Prior to Visit   Medication Sig Dispense Refill    azelastine (ASTELIN) 0.1 % nasal spray 1 spray in each nostril twice daily for 30 days FLUoxetine (PROZAC) 20 MG capsule Take 1 capsule by mouth once daily 60 capsule 1    busPIRone (BUSPAR) 7.5 MG tablet TAKE 1 TABLET BY MOUTH THREE TIMES DAILY AS NEEDED FOR ANXIETY 60 tablet 2    polyethylene glycol (GLYCOLAX) 17 GM/SCOOP powder TAKE 10 GRAMS ORALLY ONCE A DAY MIX IT WITH LIQUID AND DRINK 510 g 0    methylPREDNISolone (MEDROL, NILESH,) 4 MG tablet Take by mouth. 1 kit 0    meclizine (ANTIVERT) 25 MG tablet Take 1 tablet by mouth 3 times daily as needed for Dizziness or Nausea 15 tablet 0    pantoprazole (PROTONIX) 40 MG tablet Take 1 tablet by mouth in the morning and at bedtime 60 tablet 3    FLUoxetine (PROZAC) 10 MG capsule TAKE 1 CAPSULE BY MOUTH ONCE DAILY IN  ADDITION  TO  20MG  CAP  FOR  A  TOTAL  OF  30MG  DAILY 90 capsule 0    estradiol (ESTRACE) 0.1 MG/GM vaginal cream       montelukast (SINGULAIR) 10 MG tablet Take 1 tablet by mouth once daily 90 tablet 3    solifenacin (VESICARE) 10 MG tablet Take 1 tablet by mouth daily 30 tablet 0    hydroCHLOROthiazide (HYDRODIURIL) 25 MG tablet Take 1 tablet by mouth once daily 90 tablet 3    sucralfate (CARAFATE) 1 GM tablet Take 1 tablet by mouth 4 times daily 120 tablet 3    Psyllium (METAMUCIL FIBER PO) Take by mouth      mometasone (NASONEX) 50 MCG/ACT nasal spray USE 1 SPRAY(S) IN EACH NOSTRIL TWICE DAILY      levocetirizine (XYZAL) 5 MG tablet Take 1 tablet by mouth nightly 30 tablet 5     No current facility-administered medications on file prior to visit. Review of Systems   Constitutional:  Negative for chills, diaphoresis, fatigue and fever. HENT:  Positive for sinus pressure, sinus pain and sore throat. Negative for congestion, ear discharge, ear pain, rhinorrhea and sneezing. Respiratory:  Positive for cough. Negative for shortness of breath and wheezing. Cardiovascular:  Negative for chest pain. Gastrointestinal:  Positive for nausea and vomiting. Negative for abdominal pain and diarrhea.    Endocrine: Negative for cold intolerance and heat intolerance. Genitourinary:  Negative for dysuria and frequency. Neurological:  Positive for headaches. Negative for dizziness and light-headedness. Objective:   /62   Pulse 93   Temp 97.3 °F (36.3 °C)   Resp 18   Ht 5' 3\" (1.6 m)   Wt 153 lb 12.8 oz (69.8 kg)   LMP 01/15/2006   SpO2 97%   BMI 27.24 kg/m²     Physical Exam  Constitutional:       General: She is not in acute distress. Appearance: She is not diaphoretic. Cardiovascular:      Rate and Rhythm: Normal rate and regular rhythm. Pulses: Normal pulses. Heart sounds: Normal heart sounds, S1 normal and S2 normal.   Pulmonary:      Effort: Pulmonary effort is normal. No respiratory distress. Breath sounds: Normal breath sounds. No wheezing or rales. Chest:      Chest wall: No tenderness. Abdominal:      General: Bowel sounds are normal.      Tenderness: There is no abdominal tenderness. Neurological:      Mental Status: She is alert. Assessment:       Diagnosis Orders   1. COVID-19  POCT rapid strep A    POCT COVID-19, Antigen    nirmatrelvir/ritonavir (PAXLOVID) 20 x 150 MG & 10 x 100MG TBPK    benzonatate (TESSALON) 100 MG capsule    POCT RSV      2. Influenza A  POCT rapid strep A    POCT Influenza A/B    oseltamivir (TAMIFLU) 75 MG capsule    benzonatate (TESSALON) 100 MG capsule    POCT RSV        Plan:      Orders Placed This Encounter   Procedures    POCT rapid strep A    POCT COVID-19, Antigen     Order Specific Question:   Pregnant?      Answer:   No    POCT Influenza A/B    POCT RSV   COVID positive  Results for POC orders placed in visit on 12/06/22   POCT RSV   Result Value Ref Range    RSV Antigen negative    POCT Influenza A/B   Result Value Ref Range    Influenza A Ab positive (A)     Influenza B Ab negative    POCT rapid strep A   Result Value Ref Range    Strep A Ag None Detected None Detected     Orders Placed This Encounter   Medications    nirmatrelvir/ritonavir

## 2022-12-06 NOTE — TELEPHONE ENCOUNTER
Received call from [facility] at [facility] with Red Flag Complaint. Subjective: Caller states \"Not been feeling good, headache\"     Current Symptoms: Headache - face, forehead and temples  Facial pain    Onset: 4 days ago    Pain Severity: 7/10    Temperature: Chills     What has been tried: OTC    History related to the current reason for call: Chronic sinusitis    Recommended disposition: See in Office Today    Care advice provided, patient verbalizes understanding; denies any other questions or concerns; instructed to call back for any new or worsening symptoms. Patient/Caller agrees with recommended disposition; writer provided warm transfer to Anastacia Patel at [facility] for appointment scheduling     Attention Provider: Thank you for allowing me to participate in the care of your patient. The patient was connected to triage in response to information provided to the ECC/PSC. Please do not respond through this encounter as the response is not directed to a shared pool.       Reason for Disposition   Patient wants to be seen    Protocols used: Headache-ADULT-[place]

## 2022-12-24 DIAGNOSIS — F43.23 ADJUSTMENT DISORDER WITH MIXED ANXIETY AND DEPRESSED MOOD: ICD-10-CM

## 2022-12-27 RX ORDER — FLUOXETINE 10 MG/1
CAPSULE ORAL
Qty: 90 CAPSULE | Refills: 0 | OUTPATIENT
Start: 2022-12-27

## 2023-01-06 DIAGNOSIS — F43.23 ADJUSTMENT DISORDER WITH MIXED ANXIETY AND DEPRESSED MOOD: ICD-10-CM

## 2023-01-09 RX ORDER — FLUOXETINE 10 MG/1
CAPSULE ORAL
Qty: 90 CAPSULE | Refills: 0 | OUTPATIENT
Start: 2023-01-09

## 2023-01-10 DIAGNOSIS — F43.23 ADJUSTMENT DISORDER WITH MIXED ANXIETY AND DEPRESSED MOOD: ICD-10-CM

## 2023-01-11 RX ORDER — FLUOXETINE 10 MG/1
CAPSULE ORAL
Qty: 90 CAPSULE | Refills: 0 | OUTPATIENT
Start: 2023-01-11

## 2023-01-13 DIAGNOSIS — K29.00 ACUTE SUPERFICIAL GASTRITIS WITHOUT HEMORRHAGE: ICD-10-CM

## 2023-01-13 DIAGNOSIS — F43.23 ADJUSTMENT DISORDER WITH MIXED ANXIETY AND DEPRESSED MOOD: ICD-10-CM

## 2023-01-13 RX ORDER — FLUOXETINE 10 MG/1
CAPSULE ORAL
Qty: 90 CAPSULE | Refills: 0 | OUTPATIENT
Start: 2023-01-13

## 2023-01-13 RX ORDER — PANTOPRAZOLE SODIUM 40 MG/1
TABLET, DELAYED RELEASE ORAL
Qty: 30 TABLET | Refills: 0 | Status: SHIPPED | OUTPATIENT
Start: 2023-01-13

## 2023-01-16 DIAGNOSIS — F43.23 ADJUSTMENT DISORDER WITH MIXED ANXIETY AND DEPRESSED MOOD: ICD-10-CM

## 2023-01-16 RX ORDER — FLUOXETINE 10 MG/1
CAPSULE ORAL
Qty: 90 CAPSULE | Refills: 1 | Status: SHIPPED | OUTPATIENT
Start: 2023-01-16

## 2023-02-22 LAB
APPEARANCE, URINE: CLEAR
BILIRUBIN, URINE: NEGATIVE
BLOOD, URINE: NEGATIVE
COLOR, URINE: NORMAL
GLUCOSE, URINE: NEGATIVE MG/DL
KETONES, URINE: NEGATIVE MG/DL
LEUKOCYTE ESTERASE, URINE: NEGATIVE
NITRITE, URINE: NEGATIVE
PH, URINE: 7 (ref 5–8)
PROTEIN, URINE: NEGATIVE MG/DL
SPECIFIC GRAVITY, URINE: 1.01 (ref 1–1.03)
UROBILINOGEN, URINE: <2 MG/DL (ref 0–1.9)

## 2023-02-23 LAB — URINE CULTURE: NORMAL

## 2023-02-24 LAB
ALANINE AMINOTRANSFERASE (SGPT) (U/L) IN SER/PLAS: 12 U/L (ref 7–45)
ALBUMIN (G/DL) IN SER/PLAS: 4.3 G/DL (ref 3.4–5)
ALKALINE PHOSPHATASE (U/L) IN SER/PLAS: 40 U/L (ref 33–136)
ANION GAP IN SER/PLAS: 12 MMOL/L (ref 10–20)
ASPARTATE AMINOTRANSFERASE (SGOT) (U/L) IN SER/PLAS: 14 U/L (ref 9–39)
BASOPHILS (10*3/UL) IN BLOOD BY AUTOMATED COUNT: 0.03 X10E9/L (ref 0–0.1)
BASOPHILS/100 LEUKOCYTES IN BLOOD BY AUTOMATED COUNT: 0.7 % (ref 0–2)
BILIRUBIN TOTAL (MG/DL) IN SER/PLAS: 1.1 MG/DL (ref 0–1.2)
CALCIUM (MG/DL) IN SER/PLAS: 9 MG/DL (ref 8.6–10.3)
CARBON DIOXIDE, TOTAL (MMOL/L) IN SER/PLAS: 29 MMOL/L (ref 21–32)
CHLORIDE (MMOL/L) IN SER/PLAS: 101 MMOL/L (ref 98–107)
CHOLESTEROL (MG/DL) IN SER/PLAS: 276 MG/DL (ref 0–199)
CHOLESTEROL IN HDL (MG/DL) IN SER/PLAS: 63.8 MG/DL
CHOLESTEROL/HDL RATIO: 4.3
CREATININE (MG/DL) IN SER/PLAS: 0.84 MG/DL (ref 0.5–1.05)
EOSINOPHILS (10*3/UL) IN BLOOD BY AUTOMATED COUNT: 0.15 X10E9/L (ref 0–0.7)
EOSINOPHILS/100 LEUKOCYTES IN BLOOD BY AUTOMATED COUNT: 3.4 % (ref 0–6)
ERYTHROCYTE DISTRIBUTION WIDTH (RATIO) BY AUTOMATED COUNT: 12.9 % (ref 11.5–14.5)
ERYTHROCYTE MEAN CORPUSCULAR HEMOGLOBIN CONCENTRATION (G/DL) BY AUTOMATED: 33 G/DL (ref 32–36)
ERYTHROCYTE MEAN CORPUSCULAR VOLUME (FL) BY AUTOMATED COUNT: 98 FL (ref 80–100)
ERYTHROCYTES (10*6/UL) IN BLOOD BY AUTOMATED COUNT: 4.07 X10E12/L (ref 4–5.2)
GFR FEMALE: 76 ML/MIN/1.73M2
GLUCOSE (MG/DL) IN SER/PLAS: 85 MG/DL (ref 74–99)
HEMATOCRIT (%) IN BLOOD BY AUTOMATED COUNT: 39.7 % (ref 36–46)
HEMOGLOBIN (G/DL) IN BLOOD: 13.1 G/DL (ref 12–16)
IMMATURE GRANULOCYTES/100 LEUKOCYTES IN BLOOD BY AUTOMATED COUNT: 0.2 % (ref 0–0.9)
LDL: 190 MG/DL (ref 0–99)
LEUKOCYTES (10*3/UL) IN BLOOD BY AUTOMATED COUNT: 4.4 X10E9/L (ref 4.4–11.3)
LYMPHOCYTES (10*3/UL) IN BLOOD BY AUTOMATED COUNT: 1.47 X10E9/L (ref 1.2–4.8)
LYMPHOCYTES/100 LEUKOCYTES IN BLOOD BY AUTOMATED COUNT: 33.4 % (ref 13–44)
MONOCYTES (10*3/UL) IN BLOOD BY AUTOMATED COUNT: 0.48 X10E9/L (ref 0.1–1)
MONOCYTES/100 LEUKOCYTES IN BLOOD BY AUTOMATED COUNT: 10.9 % (ref 2–10)
NEUTROPHILS (10*3/UL) IN BLOOD BY AUTOMATED COUNT: 2.26 X10E9/L (ref 1.2–7.7)
NEUTROPHILS/100 LEUKOCYTES IN BLOOD BY AUTOMATED COUNT: 51.4 % (ref 40–80)
PLATELETS (10*3/UL) IN BLOOD AUTOMATED COUNT: 259 X10E9/L (ref 150–450)
POTASSIUM (MMOL/L) IN SER/PLAS: 3.6 MMOL/L (ref 3.5–5.3)
PROTEIN TOTAL: 6.9 G/DL (ref 6.4–8.2)
SODIUM (MMOL/L) IN SER/PLAS: 138 MMOL/L (ref 136–145)
TRIGLYCERIDE (MG/DL) IN SER/PLAS: 109 MG/DL (ref 0–149)
UREA NITROGEN (MG/DL) IN SER/PLAS: 14 MG/DL (ref 6–23)
VLDL: 22 MG/DL (ref 0–40)

## 2023-03-07 LAB
DEAMIDATED GLIADIN PEPTIDE IGA: <1 U/ML (ref 0–14)
DEAMIDATED GLIADIN PEPTIDE IGG: <1 U/ML (ref 0–14)
TISSUE TRANSGLUTAMINASE IGG: <1 U/ML (ref 0–14)
TISSUE TRANSGLUTAMINASE, IGA: <1 U/ML (ref 0–14)

## 2023-03-13 LAB — HELICOBACTER PYLORI AG: NEGATIVE

## 2023-03-31 DIAGNOSIS — F32.A DEPRESSION, UNSPECIFIED DEPRESSION TYPE: ICD-10-CM

## 2023-03-31 DIAGNOSIS — N95.1 VASOMOTOR SYMPTOMS DUE TO MENOPAUSE: ICD-10-CM

## 2023-03-31 DIAGNOSIS — I10 BENIGN ESSENTIAL HTN: Primary | ICD-10-CM

## 2023-03-31 PROBLEM — M70.62 TROCHANTERIC BURSITIS OF LEFT HIP: Status: ACTIVE | Noted: 2023-03-31

## 2023-03-31 PROBLEM — N93.9 VAGINAL HEMORRHAGE: Status: ACTIVE | Noted: 2023-03-31

## 2023-03-31 PROBLEM — M25.519 SHOULDER PAIN: Status: ACTIVE | Noted: 2023-03-31

## 2023-03-31 PROBLEM — M70.70 BURSITIS OF HIP: Status: ACTIVE | Noted: 2023-03-31

## 2023-03-31 PROBLEM — M79.673 FOOT PAIN: Status: ACTIVE | Noted: 2023-03-31

## 2023-03-31 PROBLEM — M25.559 HIP PAIN: Status: ACTIVE | Noted: 2023-03-31

## 2023-03-31 PROBLEM — R10.9 ABDOMINAL PAIN: Status: ACTIVE | Noted: 2023-03-31

## 2023-03-31 PROBLEM — M47.816 DEGENERATIVE ARTHRITIS OF LUMBAR SPINE: Status: ACTIVE | Noted: 2023-03-31

## 2023-03-31 PROBLEM — M25.819 SHOULDER IMPINGEMENT: Status: ACTIVE | Noted: 2023-03-31

## 2023-03-31 PROBLEM — M76.30 IT BAND SYNDROME: Status: ACTIVE | Noted: 2023-03-31

## 2023-03-31 PROBLEM — K58.1 IRRITABLE BOWEL SYNDROME WITH PREDOMINANT CONSTIPATION: Status: ACTIVE | Noted: 2023-03-31

## 2023-03-31 PROBLEM — K58.9 IBS (IRRITABLE BOWEL SYNDROME): Status: ACTIVE | Noted: 2023-03-31

## 2023-03-31 PROBLEM — F41.9 ANXIETY: Status: ACTIVE | Noted: 2023-03-31

## 2023-03-31 PROBLEM — N39.0 URINARY TRACT INFECTION: Status: ACTIVE | Noted: 2023-03-31

## 2023-03-31 PROBLEM — M25.579 ANKLE PAIN: Status: ACTIVE | Noted: 2023-03-31

## 2023-03-31 PROBLEM — R14.2 BELCHING: Status: ACTIVE | Noted: 2023-03-31

## 2023-03-31 PROBLEM — R19.8 ABDOMINAL WEAKNESS: Status: ACTIVE | Noted: 2023-03-31

## 2023-03-31 PROBLEM — M25.562 LEFT KNEE PAIN: Status: ACTIVE | Noted: 2023-03-31

## 2023-03-31 PROBLEM — M75.100 ROTATOR CUFF TEAR: Status: ACTIVE | Noted: 2023-03-31

## 2023-03-31 PROBLEM — M54.50 LOWER BACK PAIN: Status: ACTIVE | Noted: 2023-03-31

## 2023-03-31 PROBLEM — N39.0 RECURRENT UTI: Status: ACTIVE | Noted: 2023-03-31

## 2023-03-31 PROBLEM — E87.6 HYPOKALEMIA: Status: ACTIVE | Noted: 2023-03-31

## 2023-03-31 PROBLEM — R39.15 URINARY URGENCY: Status: ACTIVE | Noted: 2023-03-31

## 2023-03-31 PROBLEM — M25.529 ELBOW PAIN: Status: ACTIVE | Noted: 2023-03-31

## 2023-03-31 PROBLEM — N95.2 ATROPHY OF VAGINA: Status: ACTIVE | Noted: 2023-03-31

## 2023-03-31 PROBLEM — R14.0 ABDOMINAL BLOATING: Status: ACTIVE | Noted: 2023-03-31

## 2023-03-31 PROBLEM — R68.89 POSTURAL INSTABILITY OF TRUNK: Status: ACTIVE | Noted: 2023-03-31

## 2023-03-31 PROBLEM — M43.10 SPONDYLOLISTHESIS: Status: ACTIVE | Noted: 2023-03-31

## 2023-03-31 PROBLEM — M76.61 ACHILLES TENDINITIS OF RIGHT LOWER EXTREMITY: Status: ACTIVE | Noted: 2023-03-31

## 2023-03-31 RX ORDER — POLYETHYLENE GLYCOL 3350 17 G/17G
POWDER, FOR SOLUTION ORAL
COMMUNITY

## 2023-03-31 RX ORDER — MONTELUKAST SODIUM 10 MG/1
TABLET ORAL
COMMUNITY
Start: 2019-05-01 | End: 2024-02-01 | Stop reason: SDUPTHER

## 2023-03-31 RX ORDER — IPRATROPIUM BROMIDE 42 UG/1
SPRAY, METERED NASAL
COMMUNITY
Start: 2018-08-21 | End: 2023-07-25 | Stop reason: ALTCHOICE

## 2023-03-31 RX ORDER — BUSPIRONE HYDROCHLORIDE 7.5 MG/1
1 TABLET ORAL DAILY
COMMUNITY
Start: 2023-02-22 | End: 2023-04-14 | Stop reason: SDUPTHER

## 2023-03-31 RX ORDER — FLUOXETINE HYDROCHLORIDE 20 MG/1
1 CAPSULE ORAL DAILY
COMMUNITY
Start: 2023-02-22 | End: 2023-03-31 | Stop reason: SDUPTHER

## 2023-03-31 RX ORDER — HYDROCHLOROTHIAZIDE 25 MG/1
TABLET ORAL
COMMUNITY
Start: 2018-08-28 | End: 2023-03-31 | Stop reason: SDUPTHER

## 2023-03-31 RX ORDER — ROSUVASTATIN CALCIUM 10 MG/1
1 TABLET, COATED ORAL NIGHTLY
COMMUNITY
Start: 2023-02-28 | End: 2024-01-26 | Stop reason: SDUPTHER

## 2023-03-31 RX ORDER — PANTOPRAZOLE SODIUM 40 MG/1
40 TABLET, DELAYED RELEASE ORAL
COMMUNITY
Start: 2023-02-22 | End: 2023-08-01 | Stop reason: SDUPTHER

## 2023-03-31 RX ORDER — BENZONATATE 100 MG/1
CAPSULE ORAL
COMMUNITY
Start: 2022-12-06 | End: 2023-07-25 | Stop reason: ALTCHOICE

## 2023-03-31 RX ORDER — ESTRADIOL 0.1 MG/G
CREAM VAGINAL
COMMUNITY
Start: 2021-03-03 | End: 2024-02-28

## 2023-03-31 RX ORDER — MECLIZINE HYDROCHLORIDE 25 MG/1
TABLET ORAL
COMMUNITY
Start: 2022-09-30 | End: 2023-07-25 | Stop reason: ALTCHOICE

## 2023-03-31 RX ORDER — SUCRALFATE 1 G/1
1 TABLET ORAL EVERY 6 HOURS
COMMUNITY
Start: 2022-12-16 | End: 2023-12-01 | Stop reason: SDUPTHER

## 2023-03-31 RX ORDER — LEVOCETIRIZINE DIHYDROCHLORIDE 5 MG/1
TABLET, FILM COATED ORAL
COMMUNITY
Start: 2021-03-03

## 2023-03-31 RX ORDER — METHYLPREDNISOLONE 4 MG/1
TABLET ORAL
COMMUNITY
Start: 2022-09-30 | End: 2023-07-25 | Stop reason: ALTCHOICE

## 2023-03-31 RX ORDER — HYDROCHLOROTHIAZIDE 25 MG/1
TABLET ORAL
Qty: 90 TABLET | Refills: 1 | Status: SHIPPED | OUTPATIENT
Start: 2023-03-31 | End: 2023-09-15

## 2023-03-31 RX ORDER — LEVOFLOXACIN 750 MG/1
1 TABLET ORAL DAILY
COMMUNITY
Start: 2022-09-30 | End: 2023-07-25 | Stop reason: ALTCHOICE

## 2023-03-31 RX ORDER — CIPROFLOXACIN 500 MG/1
1 TABLET ORAL 2 TIMES DAILY
COMMUNITY
Start: 2022-12-07 | End: 2023-06-29 | Stop reason: SDUPTHER

## 2023-03-31 RX ORDER — FLUOXETINE HYDROCHLORIDE 20 MG/1
20 CAPSULE ORAL DAILY
Qty: 90 CAPSULE | Refills: 3 | Status: SHIPPED | OUTPATIENT
Start: 2023-03-31 | End: 2023-09-22 | Stop reason: SDUPTHER

## 2023-03-31 RX ORDER — MELOXICAM 15 MG/1
1 TABLET ORAL DAILY PRN
COMMUNITY
Start: 2021-06-28 | End: 2023-07-25 | Stop reason: ALTCHOICE

## 2023-03-31 RX ORDER — OSELTAMIVIR PHOSPHATE 75 MG/1
1 CAPSULE ORAL 2 TIMES DAILY
COMMUNITY
Start: 2022-12-08 | End: 2023-07-25 | Stop reason: ALTCHOICE

## 2023-04-01 RX ORDER — FLUOXETINE HYDROCHLORIDE 20 MG/1
CAPSULE ORAL
Qty: 60 CAPSULE | Refills: 2 | Status: SHIPPED | OUTPATIENT
Start: 2023-04-01

## 2023-04-14 DIAGNOSIS — F41.9 ANXIETY: Primary | ICD-10-CM

## 2023-04-14 NOTE — TELEPHONE ENCOUNTER
Rx Refill Request Telephone Encounter    Name:  Aaron Gilbert  :  [id number]  Medication Name:  Buspirone 7.5 mg  Specific Pharmacy location:  [facility]   Date of last appointment:  3/2  Date of next appointment:  NA  Best number to reach patient:  [phone]

## 2023-04-15 RX ORDER — BUSPIRONE HYDROCHLORIDE 7.5 MG/1
7.5 TABLET ORAL 2 TIMES DAILY PRN
Qty: 60 TABLET | Refills: 1 | Status: SHIPPED | OUTPATIENT
Start: 2023-04-15 | End: 2023-07-19

## 2023-04-21 NOTE — PATIENT INSTRUCTIONS

## 2023-05-12 DIAGNOSIS — J30.9 ALLERGIC RHINITIS, UNSPECIFIED SEASONALITY, UNSPECIFIED TRIGGER: ICD-10-CM

## 2023-05-12 RX ORDER — MONTELUKAST SODIUM 10 MG/1
TABLET ORAL
Qty: 90 TABLET | Refills: 2 | Status: SHIPPED | OUTPATIENT
Start: 2023-05-12

## 2023-05-26 DIAGNOSIS — K29.00 ACUTE SUPERFICIAL GASTRITIS WITHOUT HEMORRHAGE: ICD-10-CM

## 2023-05-26 RX ORDER — PANTOPRAZOLE SODIUM 40 MG/1
TABLET, DELAYED RELEASE ORAL
Qty: 60 TABLET | Refills: 0 | Status: SHIPPED | OUTPATIENT
Start: 2023-05-26

## 2023-06-29 ENCOUNTER — OFFICE VISIT (OUTPATIENT)
Dept: PRIMARY CARE | Facility: CLINIC | Age: 68
End: 2023-06-29
Payer: COMMERCIAL

## 2023-06-29 VITALS
HEART RATE: 87 BPM | SYSTOLIC BLOOD PRESSURE: 124 MMHG | WEIGHT: 160.6 LBS | RESPIRATION RATE: 16 BRPM | DIASTOLIC BLOOD PRESSURE: 80 MMHG | BODY MASS INDEX: 28.46 KG/M2 | TEMPERATURE: 98.2 F | HEIGHT: 63 IN | OXYGEN SATURATION: 96 %

## 2023-06-29 DIAGNOSIS — N39.0 RECURRENT UTI: Primary | ICD-10-CM

## 2023-06-29 DIAGNOSIS — N28.1 RENAL CYST, ACQUIRED, LEFT: ICD-10-CM

## 2023-06-29 PROBLEM — R14.2 BELCHING: Status: RESOLVED | Noted: 2023-03-31 | Resolved: 2023-06-29

## 2023-06-29 LAB
POC APPEARANCE, URINE: ABNORMAL
POC BILIRUBIN, URINE: NEGATIVE
POC BLOOD, URINE: NEGATIVE
POC COLOR, URINE: ABNORMAL
POC GLUCOSE, URINE: NEGATIVE MG/DL
POC KETONES, URINE: NEGATIVE MG/DL
POC LEUKOCYTES, URINE: ABNORMAL
POC NITRITE,URINE: NEGATIVE
POC PH, URINE: 6.5 PH
POC PROTEIN, URINE: NEGATIVE MG/DL
POC SPECIFIC GRAVITY, URINE: 1.02
POC UROBILINOGEN, URINE: 0.2 EU/DL

## 2023-06-29 PROCEDURE — 87086 URINE CULTURE/COLONY COUNT: CPT

## 2023-06-29 PROCEDURE — 1036F TOBACCO NON-USER: CPT | Performed by: FAMILY MEDICINE

## 2023-06-29 PROCEDURE — 81002 URINALYSIS NONAUTO W/O SCOPE: CPT | Performed by: FAMILY MEDICINE

## 2023-06-29 PROCEDURE — 99213 OFFICE O/P EST LOW 20 MIN: CPT | Performed by: FAMILY MEDICINE

## 2023-06-29 PROCEDURE — 1159F MED LIST DOCD IN RCRD: CPT | Performed by: FAMILY MEDICINE

## 2023-06-29 RX ORDER — CIPROFLOXACIN 500 MG/1
500 TABLET ORAL 2 TIMES DAILY
Qty: 20 TABLET | Refills: 0 | Status: SHIPPED | OUTPATIENT
Start: 2023-06-29 | End: 2023-07-09

## 2023-06-29 ASSESSMENT — PATIENT HEALTH QUESTIONNAIRE - PHQ9
SUM OF ALL RESPONSES TO PHQ9 QUESTIONS 1 & 2: 0
1. LITTLE INTEREST OR PLEASURE IN DOING THINGS: NOT AT ALL
2. FEELING DOWN, DEPRESSED OR HOPELESS: NOT AT ALL

## 2023-06-29 ASSESSMENT — ENCOUNTER SYMPTOMS
LOSS OF SENSATION IN FEET: 0
DEPRESSION: 0
OCCASIONAL FEELINGS OF UNSTEADINESS: 0

## 2023-06-29 ASSESSMENT — LIFESTYLE VARIABLES
AUDIT-C TOTAL SCORE: 0
HOW OFTEN DO YOU HAVE A DRINK CONTAINING ALCOHOL: NEVER
HOW OFTEN DO YOU HAVE SIX OR MORE DRINKS ON ONE OCCASION: NEVER
SKIP TO QUESTIONS 9-10: 1
HOW MANY STANDARD DRINKS CONTAINING ALCOHOL DO YOU HAVE ON A TYPICAL DAY: PATIENT DOES NOT DRINK

## 2023-06-29 NOTE — PATIENT INSTRUCTIONS
Patient was instructed to drink plenty of fluids including water and cranberry juice. Take medications as instructed. Follow-up if symptoms persist or worsen otherwise as needed.     Will obtain IO UA, C&S today. Will call patient with results when available.    Patient was started on:   Cipro 500 mg p.o. twice daily for 10 days.  She may finish the amoxicillin as well for her dental infection.    Rx(s) sent to pharmacy.     Patient was instructed to follow-up with her gynecologist, Dr. Fish, for further evaluation of her urinary frequency and straining with urination.

## 2023-06-29 NOTE — PROGRESS NOTES
Subjective   Patient ID: Aaron Gilbert is a 68 y.o. female who presents for Urinary Problem, Bladder Problem, and Follow-up. I last saw the patient on 2/22/23.     HPI   Patient states that she is not feeling too good. She states that last night she got up many times, every 30 minutes, to urinate and also caught herself feeling dizzy at times. Patient also c/o fatigue . Onset 3 days ago. Patient has been taking herbs for her symptoms. Denies burning with urination, but admits to odor to the urine.     She started Cipro 500 mg, 2 days ago.     She also c/o frequent headaches and recurrent sinus issues and thinks maybe this could be the cause.     Review of Systems  Except positives as noted in the CC & HPI      Constitutional: Denies fevers, chills, night sweats, fatigue, weight changes, change in appetite    Eyes: Denies blurry vision, double vision    ENT: Denies otalgia, trouble hearing, tinnitus, vertigo, nasal congestion, rhinorrhea, sore throat    Neck: Denies swelling, masses    Cardiovascular: Denies chest pain, palpitations, edema, orthopnea, syncope    Respiratory: Denies dyspnea, cough, wheezing, postural nocturnal dyspnea    Gastrointestinal: Denies abdominal pain, nausea, vomiting, diarrhea, constipation, melena, hematochezia    Genitourinary: Denies dysuria, hematuria, frequency, urgency, urine odor   Musculoskeletal: Denies back pain, neck pain, arthralgias, myalgias    Integumentary: Denies skin lesions, rashes, masses    Neurological: Denies dizziness, headaches, confusion, limb weakness, paresthesias, syncope, convulsions    Psychiatric: Denies depression, anxiety, homicidal ideations, suicidal ideations, sleep disturbances    Endocrine: Denies polyphagia, polydipsia, polyuria, weakness, hair thinning, heat intolerance, cold intolerance, weight changes    Heme/Lymph: Denies easy bruising, easy bleeding, swollen glands     Objective   /80   Pulse 87   Temp 36.8 °C (98.2 °F)   Resp 16   Ht  "1.6 m (5' 3\")   Wt 72.8 kg (160 lb 9.6 oz)   SpO2 96%   BMI 28.45 kg/m²     Physical Exam  Gen. Appearance - well-developed, well-nourished, 68 y.o., White female in no acute distress.     Skin - warm, pink and dry without rash or concerning lesions.    Mental Status - alert and oriented times 3. Normal mood and affect appropriate to mood.     Neck - supple without lymphadenopathy. Carotid pulses are normal without bruits. Thyroid is normal in midline without nodules.    Chest - lungs are clear to auscultation without rales, rhonchi or wheezes.     Heart - regular, rate, and rhythm without murmurs, rubs or gallops.     Abdomen - soft, mildly obese, nontender, nondistended. No masses, hepatomegaly or splenomegaly is noted. No rebound, rigidity or guarding is noted. Bowel sounds are normoactive. No CVA tenderness.     Extremities - no cyanosis, clubbing or edema. Pedal pulses are 2+ normal at the dorsalis pedis and posterior tibial pulses bilaterally.     Neurological - cranial nerves II through XII are grossly intact. Motor strength 5/5 at all fours.     Results  Reviewed renal US results from 3/3/23.      Assessment/Plan   1. Recurrent UTI          Patient was instructed to drink plenty of fluids including water and cranberry juice. Take medications as instructed. Follow-up if symptoms persist or worsen otherwise as needed.     Will obtain IO UA, C&S today. Will call patient with results when available.    Patient was started on:   Cipro 500 mg p.o. twice daily for 10 days.  She may finish the amoxicillin as well for her dental infection.    Rx(s) sent to pharmacy.     Patient was instructed to follow-up with her gynecologist, Dr. Fish, for further evaluation of her urinary frequency and straining with urination.      Scribe Attestation  By signing my name below, I, Liam Cline   attest that this documentation has been prepared under the direction and in the presence of Landon Wells MD.   "

## 2023-07-01 LAB — URINE CULTURE: NORMAL

## 2023-07-18 DIAGNOSIS — F41.9 ANXIETY: ICD-10-CM

## 2023-07-19 RX ORDER — BUSPIRONE HYDROCHLORIDE 7.5 MG/1
TABLET ORAL
Qty: 60 TABLET | Refills: 3 | Status: SHIPPED | OUTPATIENT
Start: 2023-07-19 | End: 2023-11-13

## 2023-07-25 ENCOUNTER — TELEPHONE (OUTPATIENT)
Dept: PRIMARY CARE | Facility: CLINIC | Age: 68
End: 2023-07-25
Payer: COMMERCIAL

## 2023-07-25 DIAGNOSIS — E78.00 ELEVATED CHOLESTEROL: ICD-10-CM

## 2023-07-25 DIAGNOSIS — E78.2 MIXED HYPERLIPIDEMIA: Primary | ICD-10-CM

## 2023-07-25 NOTE — TELEPHONE ENCOUNTER
Patient called in stating she is looking to have her cholesterol checked. She is requesting lab orders for this  Please Advise

## 2023-07-26 NOTE — TELEPHONE ENCOUNTER
LMOM informing patient fasting labs ordered and need to be completed after starting Rx Rosuvastatin. Advised patient to call office back w/ any questions.

## 2023-07-28 ENCOUNTER — LAB (OUTPATIENT)
Dept: LAB | Facility: LAB | Age: 68
End: 2023-07-28
Payer: COMMERCIAL

## 2023-07-28 DIAGNOSIS — E78.00 ELEVATED CHOLESTEROL: ICD-10-CM

## 2023-07-28 LAB
ALANINE AMINOTRANSFERASE (SGPT) (U/L) IN SER/PLAS: 15 U/L (ref 7–45)
ALBUMIN (G/DL) IN SER/PLAS: 4.2 G/DL (ref 3.4–5)
ALKALINE PHOSPHATASE (U/L) IN SER/PLAS: 42 U/L (ref 33–136)
ASPARTATE AMINOTRANSFERASE (SGOT) (U/L) IN SER/PLAS: 18 U/L (ref 9–39)
BILIRUBIN DIRECT (MG/DL) IN SER/PLAS: 0.2 MG/DL (ref 0–0.3)
BILIRUBIN TOTAL (MG/DL) IN SER/PLAS: 1 MG/DL (ref 0–1.2)
CHOLESTEROL (MG/DL) IN SER/PLAS: 164 MG/DL (ref 0–199)
CHOLESTEROL IN HDL (MG/DL) IN SER/PLAS: 56.6 MG/DL
CHOLESTEROL/HDL RATIO: 2.9
LDL: 93 MG/DL (ref 0–99)
PROTEIN TOTAL: 6.4 G/DL (ref 6.4–8.2)
TRIGLYCERIDE (MG/DL) IN SER/PLAS: 70 MG/DL (ref 0–149)
VLDL: 14 MG/DL (ref 0–40)

## 2023-07-28 PROCEDURE — 80076 HEPATIC FUNCTION PANEL: CPT

## 2023-07-28 PROCEDURE — 36415 COLL VENOUS BLD VENIPUNCTURE: CPT

## 2023-07-28 PROCEDURE — 80061 LIPID PANEL: CPT

## 2023-08-01 DIAGNOSIS — K58.1 IRRITABLE BOWEL SYNDROME WITH PREDOMINANT CONSTIPATION: Primary | ICD-10-CM

## 2023-08-01 RX ORDER — PANTOPRAZOLE SODIUM 40 MG/1
40 TABLET, DELAYED RELEASE ORAL
Qty: 30 TABLET | Refills: 0 | Status: SHIPPED | OUTPATIENT
Start: 2023-08-01 | End: 2023-08-18

## 2023-08-01 NOTE — TELEPHONE ENCOUNTER
Rx Refill Request Telephone Encounter    Name:  Aaron Gilbert  :  [id number]  Medication Name:  pantoprazole 40mg   Specific Pharmacy location:  [facility]   Date of last appointment: 23   Date of next appointment:  n/a  Best number to reach patient:  [phone]

## 2023-08-17 DIAGNOSIS — K58.1 IRRITABLE BOWEL SYNDROME WITH PREDOMINANT CONSTIPATION: ICD-10-CM

## 2023-08-18 RX ORDER — PANTOPRAZOLE SODIUM 40 MG/1
40 TABLET, DELAYED RELEASE ORAL
Qty: 30 TABLET | Refills: 5 | Status: SHIPPED | OUTPATIENT
Start: 2023-08-18 | End: 2024-02-09 | Stop reason: SDUPTHER

## 2023-09-15 DIAGNOSIS — I10 BENIGN ESSENTIAL HTN: ICD-10-CM

## 2023-09-15 RX ORDER — HYDROCHLOROTHIAZIDE 25 MG/1
TABLET ORAL
Qty: 90 TABLET | Refills: 1 | Status: SHIPPED | OUTPATIENT
Start: 2023-09-15 | End: 2024-03-02

## 2023-09-15 NOTE — TELEPHONE ENCOUNTER
Medication has been pended to provider for approval.     LV: 6/29/2023   NV:  Visit date not found

## 2023-09-22 DIAGNOSIS — F32.A DEPRESSION, UNSPECIFIED DEPRESSION TYPE: Primary | ICD-10-CM

## 2023-09-22 RX ORDER — FLUOXETINE HYDROCHLORIDE 20 MG/1
20 CAPSULE ORAL DAILY
Qty: 30 CAPSULE | Refills: 0 | Status: SHIPPED | OUTPATIENT
Start: 2023-09-22 | End: 2024-01-26 | Stop reason: SDUPTHER

## 2023-09-22 NOTE — TELEPHONE ENCOUNTER
Rx Refill Request Telephone Encounter    Name:  Aaron Gilbert  :  [id number]  Medication Name:  fluoxetine 20mg  Specific Pharmacy location:  [facility]   Date of last appointment:  23  Date of next appointment: n/a    Best number to reach patient:  [phone]

## 2023-11-11 DIAGNOSIS — F41.9 ANXIETY: Primary | ICD-10-CM

## 2023-11-13 RX ORDER — BUSPIRONE HYDROCHLORIDE 7.5 MG/1
TABLET ORAL
Qty: 60 TABLET | Refills: 5 | Status: SHIPPED | OUTPATIENT
Start: 2023-11-13 | End: 2023-12-01 | Stop reason: DRUGHIGH

## 2023-11-24 PROBLEM — J34.3 HYPERTROPHY OF NASAL TURBINATES: Chronic | Status: ACTIVE | Noted: 2017-08-07

## 2023-11-24 PROBLEM — K21.9 GASTROESOPHAGEAL REFLUX DISEASE WITHOUT ESOPHAGITIS: Status: ACTIVE | Noted: 2017-09-27

## 2023-11-24 RX ORDER — SOLIFENACIN SUCCINATE 10 MG/1
1 TABLET, FILM COATED ORAL DAILY
COMMUNITY
Start: 2022-03-15 | End: 2023-12-01

## 2023-11-24 RX ORDER — MECLIZINE HYDROCHLORIDE 25 MG/1
25 TABLET ORAL
COMMUNITY
Start: 2022-09-30 | End: 2023-12-01 | Stop reason: ALTCHOICE

## 2023-11-24 RX ORDER — SIMVASTATIN 20 MG/1
20 TABLET, FILM COATED ORAL
COMMUNITY
Start: 2019-03-04 | End: 2023-12-01 | Stop reason: ALTCHOICE

## 2023-11-24 RX ORDER — MIRABEGRON 25 MG/1
1 TABLET, FILM COATED, EXTENDED RELEASE ORAL NIGHTLY
COMMUNITY
Start: 2023-07-06 | End: 2023-12-01 | Stop reason: ALTCHOICE

## 2023-11-24 RX ORDER — IBUPROFEN 600 MG/1
600 TABLET ORAL EVERY 8 HOURS PRN
COMMUNITY
Start: 2023-06-27 | End: 2023-12-01 | Stop reason: ALTCHOICE

## 2023-11-24 RX ORDER — MOMETASONE FUROATE 50 UG/1
1 SPRAY, METERED NASAL 2 TIMES DAILY
COMMUNITY
Start: 2020-01-06

## 2023-11-24 RX ORDER — AMOXICILLIN 500 MG/1
CAPSULE ORAL
COMMUNITY
Start: 2023-10-17 | End: 2023-12-01 | Stop reason: ALTCHOICE

## 2023-11-24 RX ORDER — ACETAMINOPHEN 500 MG
500 TABLET ORAL EVERY 8 HOURS PRN
COMMUNITY
Start: 2023-06-27

## 2023-11-24 RX ORDER — NIRMATRELVIR AND RITONAVIR 300-100 MG
KIT ORAL
COMMUNITY
Start: 2022-12-06 | End: 2023-12-01 | Stop reason: ALTCHOICE

## 2023-11-24 RX ORDER — HYDROCODONE BITARTRATE AND ACETAMINOPHEN 5; 325 MG/1; MG/1
TABLET ORAL
COMMUNITY
Start: 2016-05-12 | End: 2023-12-01 | Stop reason: ALTCHOICE

## 2023-11-24 RX ORDER — ESOMEPRAZOLE MAGNESIUM 40 MG/1
40 CAPSULE, DELAYED RELEASE ORAL
COMMUNITY
Start: 2019-03-04 | End: 2023-12-01

## 2023-12-01 ENCOUNTER — OFFICE VISIT (OUTPATIENT)
Dept: PRIMARY CARE | Facility: CLINIC | Age: 68
End: 2023-12-01
Payer: COMMERCIAL

## 2023-12-01 VITALS
TEMPERATURE: 97.7 F | HEART RATE: 62 BPM | DIASTOLIC BLOOD PRESSURE: 73 MMHG | SYSTOLIC BLOOD PRESSURE: 117 MMHG | WEIGHT: 166.2 LBS | BODY MASS INDEX: 29.44 KG/M2 | OXYGEN SATURATION: 99 %

## 2023-12-01 DIAGNOSIS — J30.2 SEASONAL ALLERGIES: ICD-10-CM

## 2023-12-01 DIAGNOSIS — H61.21 IMPACTED CERUMEN OF RIGHT EAR: ICD-10-CM

## 2023-12-01 DIAGNOSIS — Z12.11 SCREENING FOR COLON CANCER: ICD-10-CM

## 2023-12-01 DIAGNOSIS — K58.1 IRRITABLE BOWEL SYNDROME WITH PREDOMINANT CONSTIPATION: ICD-10-CM

## 2023-12-01 DIAGNOSIS — F41.9 ANXIETY: ICD-10-CM

## 2023-12-01 DIAGNOSIS — E66.3 OVERWEIGHT WITH BODY MASS INDEX (BMI) OF 29 TO 29.9 IN ADULT: ICD-10-CM

## 2023-12-01 DIAGNOSIS — K21.9 GASTROESOPHAGEAL REFLUX DISEASE WITHOUT ESOPHAGITIS: ICD-10-CM

## 2023-12-01 DIAGNOSIS — Z00.00 HEALTHCARE MAINTENANCE: Primary | ICD-10-CM

## 2023-12-01 PROCEDURE — 3008F BODY MASS INDEX DOCD: CPT | Performed by: FAMILY MEDICINE

## 2023-12-01 PROCEDURE — 1036F TOBACCO NON-USER: CPT | Performed by: FAMILY MEDICINE

## 2023-12-01 PROCEDURE — 99397 PER PM REEVAL EST PAT 65+ YR: CPT | Performed by: FAMILY MEDICINE

## 2023-12-01 PROCEDURE — 1159F MED LIST DOCD IN RCRD: CPT | Performed by: FAMILY MEDICINE

## 2023-12-01 RX ORDER — SUCRALFATE 1 G/1
1 TABLET ORAL
Qty: 90 TABLET | Refills: 3 | Status: SHIPPED | OUTPATIENT
Start: 2023-12-01

## 2023-12-01 RX ORDER — BUSPIRONE HYDROCHLORIDE 15 MG/1
15 TABLET ORAL 3 TIMES DAILY
Qty: 90 TABLET | Refills: 5 | Status: SHIPPED | OUTPATIENT
Start: 2023-12-01 | End: 2024-05-29

## 2023-12-01 ASSESSMENT — PATIENT HEALTH QUESTIONNAIRE - PHQ9
SUM OF ALL RESPONSES TO PHQ9 QUESTIONS 1 AND 2: 0
1. LITTLE INTEREST OR PLEASURE IN DOING THINGS: NOT AT ALL
2. FEELING DOWN, DEPRESSED OR HOPELESS: NOT AT ALL

## 2023-12-01 NOTE — PATIENT INSTRUCTIONS
Patient to continue current medications (with any exceptions as noted) and diet. Follow-up in 6 month(s) otherwise as needed.      Patient is to return for fasting CBC, CMP, lipid panel labs at their convenience prior to her next appointment. Fasting is nothing to eat or drink except water or black coffee for 8-12 hours. Will call patient with results when available.     Increase Buspirone to 15 mg.     Patient was given refill(s) on:   Buspirone HCl 15 mg, TAKE 1 TAB BY MOUTH THREE TIMES DAILY   Sucralfate 1 gm, TAKE 1 TAB BY MOUTH THREE TIMES DAILY    Rx(s) sent to pharmacy.     Patient was ordered a Cologuard test for colorectal cancer screening. Patient will be contacted by [employer] to verify home address, insurance, etc. Once the kit is received and completed, patient is to ship the kit back via [employer] on a Monday or Tuesday to ensure it is received by [employer] for processing in the appropriate time frame. Patient may drop off the completed kit at any [employer] store or schedule a pick-up at their home, whichever is more convenient. Failure to ship on a Monday or Tuesday could cause their sample to become invalid for testing due to the delay.     Patient declines flu vaccine.     Recommend patient receive pneumonia vaccine, she will think about it.

## 2023-12-01 NOTE — PROGRESS NOTES
Subjective   Patient ID: Aaron Gilbert is a 68 y.o. female who presents for Annual Exam. I last saw the patient 6/29/2023.     HPI   Patient sees Dr. Fish for gynecology.     No medical concerns. She states that she has been doing well.     She states that Buspirone 7.5 mg does not seem to be helping her anxiety anymore.     She was sent a message that Pantoprazole will no longer be covered by her insurance. She has tried all other OTC with no relief. She states that Pantoprazole is the only medication that helps her GERD symptoms.     Review of Systems  Except positives as noted in the CC & HPI      Constitutional: Denies fevers, chills, night sweats, fatigue, weight changes, change in appetite    Eyes: Denies blurry vision, double vision    ENT: Denies otalgia, trouble hearing, tinnitus, vertigo, nasal congestion, rhinorrhea, sore throat    Neck: Denies swelling, masses    Cardiovascular: Denies chest pain, palpitations, edema, orthopnea, syncope    Respiratory: Denies dyspnea, cough, wheezing, postural nocturnal dyspnea    Gastrointestinal: Denies abdominal pain, nausea, vomiting, diarrhea, constipation, melena, hematochezia    Genitourinary: Denies dysuria, hematuria, frequency, urgency    Musculoskeletal: Denies back pain, neck pain, arthralgias, myalgias    Integumentary: Denies skin lesions, rashes, masses    Neurological: Denies dizziness, headaches, confusion, limb weakness, paresthesias, syncope, convulsions    Psychiatric: Denies depression, homicidal ideations, suicidal ideations, sleep disturbances    Endocrine: Denies polyphagia, polydipsia, polyuria, weakness, hair thinning, heat intolerance, cold intolerance, weight changes    Heme/Lymph: Denies easy bruising, easy bleeding, swollen glands    Objective   /73   Pulse 62   Temp 36.5 °C (97.7 °F) (Temporal)   Wt 75.4 kg (166 lb 3.2 oz)   SpO2 99%   BMI 29.44 kg/m²     Physical Exam  Gen. Appearance - well-developed, well-nourished, 68  y.o., White female in no acute distress.     Skin - warm, pink and dry without rash or concerning lesions.     Mental Status - alert and oriented times 3. Normal mood and affect appropriate to mood.     Ears - Right ear was impacted with cerumen. Procedure: Using an ear loop, cerumen was removed atraumatically from the right ear with moderate difficulty. Cerumen irrigation was performed in the office with water and peroxide with success by the medical assistant. The patient tolerated the procedure well.     Mouth - pharynx is pink without exudates. Dentition is normal appearing. Tongue and uvula move in the midline.     Neck - supple without lymphadenopathy. Carotid pulses are normal without bruits. Thyroid is normal in midline without nodules.    Chest - lungs are clear to auscultation without rales, rhonchi or wheezes.     Heart - regular, rate, and rhythm without murmurs, rubs or gallops.     Abdomen - soft, mildly obese, nontender, nondistended. No masses, hepatomegaly or splenomegaly is noted. No rebound, rigidity or guarding is noted. Bowel sounds are normoactive.     Extremities - no cyanosis, clubbing or edema. Pedal pulses are 2+ normal at the dorsalis pedis and posterior tibial pulses bilaterally.     Neurological - cranial nerves II through XII are grossly intact. Motor strength 5/5 at all fours.     Assessment/Plan   1. Healthcare maintenance        2. Gastroesophageal reflux disease without esophagitis  sucralfate (Carafate) 1 gram tablet    [facility] - PCP - Established      3. Irritable bowel syndrome with predominant constipation  Follow [facility] - PCP - Established      4. Anxiety  busPIRone (Buspar) 15 mg tablet    Follow Up [facility] - PCP - Established      5. Seasonal allergies        6. Impacted cerumen of right ear        7. Overweight with body mass index (BMI) of 29 to 29.9 in adult  CBC and Auto Differential    Comprehensive  Metabolic Panel      8. Screening for colon cancer  Cologuard® colon cancer screening    Cologuard® colon cancer screening      Patient to continue current medications (with any exceptions as noted) and diet. Follow-up in 6 month(s) otherwise as needed.      Patient is to return for fasting CBC, CMP, lipid panel labs at their convenience prior to her next appointment. Fasting is nothing to eat or drink except water or black coffee for 8-12 hours. Will call patient with results when available.     Increase Buspirone to 15 mg.     Patient was given refill(s) on:   Buspirone HCl 15 mg, TAKE 1 TAB BY MOUTH THREE TIMES DAILY   Sucralfate 1 gm, TAKE 1 TAB BY MOUTH THREE TIMES DAILY    Rx(s) sent to pharmacy.     Patient was ordered a Cologuard test for colorectal cancer screening. Patient will be contacted by [employer] to verify home address, insurance, etc. Once the kit is received and completed, patient is to ship the kit back via [employer] on a Monday or Tuesday to ensure it is received by [employer] for processing in the appropriate time frame. Patient may drop off the completed kit at any [employer] store or schedule a pick-up at their home, whichever is more convenient. Failure to ship on a Monday or Tuesday could cause their sample to become invalid for testing due to the delay.     Patient declines flu vaccine.     Recommend patient receive pneumonia vaccine, she will think about it.         Scribe Attestation  By signing my name below, Ladonna HERNANDEZ Scribe   attest that this documentation has been prepared under the direction and in the presence of Landon Wells MD.

## 2023-12-15 DIAGNOSIS — F43.23 ADJUSTMENT DISORDER WITH MIXED ANXIETY AND DEPRESSED MOOD: ICD-10-CM

## 2023-12-16 RX ORDER — FLUOXETINE 10 MG/1
CAPSULE ORAL
Qty: 90 CAPSULE | Refills: 0 | Status: SHIPPED | OUTPATIENT
Start: 2023-12-16

## 2024-01-19 DIAGNOSIS — F43.23 ADJUSTMENT DISORDER WITH MIXED ANXIETY AND DEPRESSED MOOD: ICD-10-CM

## 2024-01-20 RX ORDER — FLUOXETINE 10 MG/1
CAPSULE ORAL
Qty: 60 CAPSULE | Refills: 0 | Status: SHIPPED | OUTPATIENT
Start: 2024-01-20

## 2024-01-26 DIAGNOSIS — E78.00 ELEVATED CHOLESTEROL: ICD-10-CM

## 2024-01-26 DIAGNOSIS — F32.A DEPRESSION, UNSPECIFIED DEPRESSION TYPE: Primary | ICD-10-CM

## 2024-01-26 RX ORDER — ROSUVASTATIN CALCIUM 10 MG/1
10 TABLET, COATED ORAL NIGHTLY
Qty: 90 TABLET | Refills: 3 | Status: SHIPPED | OUTPATIENT
Start: 2024-01-26 | End: 2024-02-14

## 2024-01-26 RX ORDER — FLUOXETINE HYDROCHLORIDE 20 MG/1
20 CAPSULE ORAL DAILY
Qty: 90 CAPSULE | Refills: 3 | Status: SHIPPED | OUTPATIENT
Start: 2024-01-26

## 2024-01-26 NOTE — TELEPHONE ENCOUNTER
Problem:  Care  Goal: Moosic assessment and vital signs within acceptable range  Outcome: Outcome Met, Continue evaluating goal progress toward completion  Goal: Moosic has at least two successful feeding interactions  Outcome: Outcome Met, Continue evaluating goal progress toward completion     Problem: Breastfeeding  Goal: Successful breastfeeding, as evidenced by proper suck/swallow, <10% weight loss, adequate void/stool.  Outcome: Outcome Met, Continue evaluating goal progress toward completion      Rx Refill Request Telephone Encounter    Name:  Aaron Gilbert  :  [id number]  Medication Name:  FLUoxetine (PROzac) 20 mg capsule and rosuvastatin (Crestor) 10 mg   Specific Pharmacy location:  [facility]   Date of last appointment:  2023  Date of next appointment:  2024  Best number to reach patient:  [phone]

## 2024-01-30 DIAGNOSIS — J30.9 ALLERGIC RHINITIS, UNSPECIFIED SEASONALITY, UNSPECIFIED TRIGGER: ICD-10-CM

## 2024-01-31 RX ORDER — MONTELUKAST SODIUM 10 MG/1
TABLET ORAL
Qty: 90 TABLET | Refills: 0 | OUTPATIENT
Start: 2024-01-31

## 2024-02-01 DIAGNOSIS — J30.2 SEASONAL ALLERGIES: Primary | ICD-10-CM

## 2024-02-01 RX ORDER — MONTELUKAST SODIUM 10 MG/1
10 TABLET ORAL DAILY
Qty: 90 TABLET | Refills: 3 | Status: SHIPPED | OUTPATIENT
Start: 2024-02-01

## 2024-02-01 NOTE — TELEPHONE ENCOUNTER
Rx Refill Request Telephone Encounter    Name:  Aaron Gilbert  :  [id number]  Medication Name:  MONTELUKAST 10MG   Specific Pharmacy location:  [facility] [place]  Date of last appointment:  23  Date of next appointment:  6-3-24  Best number to reach patient:  [phone]

## 2024-02-05 ENCOUNTER — APPOINTMENT (OUTPATIENT)
Dept: PRIMARY CARE | Facility: CLINIC | Age: 69
End: 2024-02-05
Payer: COMMERCIAL

## 2024-02-09 DIAGNOSIS — K58.1 IRRITABLE BOWEL SYNDROME WITH PREDOMINANT CONSTIPATION: ICD-10-CM

## 2024-02-09 RX ORDER — PANTOPRAZOLE SODIUM 40 MG/1
40 TABLET, DELAYED RELEASE ORAL
Qty: 30 TABLET | Refills: 5 | Status: SHIPPED | OUTPATIENT
Start: 2024-02-09

## 2024-02-09 NOTE — TELEPHONE ENCOUNTER
Rx Refill Request     Name: Aaron Gilbert  :  1955   Medication Name:  PANTOPRAZOLE 40MG  Specific Pharmacy location:  [facility]  Date of last appointment:  2023   Date of next appointment:  6/3/2024   Best number to reach patient:  [phone]

## 2024-02-14 DIAGNOSIS — E78.00 ELEVATED CHOLESTEROL: Primary | ICD-10-CM

## 2024-02-14 RX ORDER — ROSUVASTATIN CALCIUM 10 MG/1
10 TABLET, COATED ORAL NIGHTLY
Qty: 90 TABLET | Refills: 3 | Status: SHIPPED | OUTPATIENT
Start: 2024-02-14

## 2024-02-14 NOTE — TELEPHONE ENCOUNTER
Recent Visits  Date Type Provider Dept   12/01/23 Office Visit Landon Wells MD Do [id number] [facility]   06/29/23 Office Visit Landon Wells MD Do [id number] [facility]   Showing recent visits within past 540 days and meeting all other requirements  Future Appointments  Date Type Provider Dept   06/03/24 Appointment Landon Wells MD Do [id number] [facility]   Showing future appointments within next 180 days and meeting all other requirements

## 2024-02-27 DIAGNOSIS — N90.5 VULVAR ATROPHY: Primary | ICD-10-CM

## 2024-02-28 RX ORDER — ESTRADIOL 0.1 MG/G
CREAM VAGINAL
Qty: 43 G | Refills: 11 | Status: SHIPPED | OUTPATIENT
Start: 2024-02-28

## 2024-03-01 DIAGNOSIS — I10 BENIGN ESSENTIAL HTN: ICD-10-CM

## 2024-03-01 NOTE — TELEPHONE ENCOUNTER
Recent Visits  Date Type Provider Dept   12/01/23 Office Visit Landon Wells MD Do [id number] [facility]   06/29/23 Office Visit Landon Wells MD Do [id number] [facility]   Showing recent visits within past 540 days and meeting all other requirements  Future Appointments  Date Type Provider Dept   06/03/24 Appointment Landon Wells MD Do [id number] [facility]   Showing future appointments within next 180 days and meeting all other requirements

## 2024-03-02 RX ORDER — HYDROCHLOROTHIAZIDE 25 MG/1
TABLET ORAL
Qty: 90 TABLET | Refills: 3 | Status: SHIPPED | OUTPATIENT
Start: 2024-03-02

## 2024-04-10 ENCOUNTER — APPOINTMENT (OUTPATIENT)
Dept: ORTHOPEDIC SURGERY | Facility: CLINIC | Age: 69
End: 2024-04-10
Payer: COMMERCIAL

## 2024-04-11 ENCOUNTER — HOSPITAL ENCOUNTER (OUTPATIENT)
Dept: RADIOLOGY | Facility: CLINIC | Age: 69
Discharge: HOME | End: 2024-04-11
Payer: COMMERCIAL

## 2024-04-11 ENCOUNTER — OFFICE VISIT (OUTPATIENT)
Dept: ORTHOPEDIC SURGERY | Facility: CLINIC | Age: 69
End: 2024-04-11
Payer: COMMERCIAL

## 2024-04-11 ENCOUNTER — APPOINTMENT (OUTPATIENT)
Dept: ORTHOPEDIC SURGERY | Facility: CLINIC | Age: 69
End: 2024-04-11
Payer: COMMERCIAL

## 2024-04-11 VITALS — HEIGHT: 63 IN | BODY MASS INDEX: 28.53 KG/M2 | WEIGHT: 161 LBS

## 2024-04-11 DIAGNOSIS — M75.81 ROTATOR CUFF TENDINITIS, RIGHT: ICD-10-CM

## 2024-04-11 DIAGNOSIS — M25.511 ACUTE PAIN OF RIGHT SHOULDER: ICD-10-CM

## 2024-04-11 DIAGNOSIS — M25.551 PAIN OF RIGHT HIP: ICD-10-CM

## 2024-04-11 DIAGNOSIS — M70.61 TROCHANTERIC BURSITIS OF RIGHT HIP: ICD-10-CM

## 2024-04-11 DIAGNOSIS — M54.16 LUMBAR RADICULOPATHY, RIGHT: Primary | ICD-10-CM

## 2024-04-11 PROCEDURE — 99214 OFFICE O/P EST MOD 30 MIN: CPT | Performed by: ORTHOPAEDIC SURGERY

## 2024-04-11 PROCEDURE — 2500000004 HC RX 250 GENERAL PHARMACY W/ HCPCS (ALT 636 FOR OP/ED): Performed by: ORTHOPAEDIC SURGERY

## 2024-04-11 PROCEDURE — 3008F BODY MASS INDEX DOCD: CPT | Performed by: ORTHOPAEDIC SURGERY

## 2024-04-11 PROCEDURE — 2500000005 HC RX 250 GENERAL PHARMACY W/O HCPCS: Performed by: ORTHOPAEDIC SURGERY

## 2024-04-11 PROCEDURE — 73030 X-RAY EXAM OF SHOULDER: CPT | Mod: RT

## 2024-04-11 PROCEDURE — 20610 DRAIN/INJ JOINT/BURSA W/O US: CPT | Performed by: ORTHOPAEDIC SURGERY

## 2024-04-11 PROCEDURE — 73502 X-RAY EXAM HIP UNI 2-3 VIEWS: CPT | Mod: RT

## 2024-04-11 PROCEDURE — 20610 DRAIN/INJ JOINT/BURSA W/O US: CPT | Mod: RT | Performed by: ORTHOPAEDIC SURGERY

## 2024-04-11 PROCEDURE — 73502 X-RAY EXAM HIP UNI 2-3 VIEWS: CPT | Mod: RIGHT SIDE | Performed by: ORTHOPAEDIC SURGERY

## 2024-04-11 PROCEDURE — 73030 X-RAY EXAM OF SHOULDER: CPT | Mod: RIGHT SIDE | Performed by: ORTHOPAEDIC SURGERY

## 2024-04-11 PROCEDURE — 99214 OFFICE O/P EST MOD 30 MIN: CPT | Mod: 25 | Performed by: ORTHOPAEDIC SURGERY

## 2024-04-11 RX ORDER — BETAMETHASONE SODIUM PHOSPHATE AND BETAMETHASONE ACETATE 3; 3 MG/ML; MG/ML
2 INJECTION, SUSPENSION INTRA-ARTICULAR; INTRALESIONAL; INTRAMUSCULAR; SOFT TISSUE
Status: COMPLETED | OUTPATIENT
Start: 2024-04-11 | End: 2024-04-11

## 2024-04-11 RX ORDER — LIDOCAINE HYDROCHLORIDE 10 MG/ML
5 INJECTION INFILTRATION; PERINEURAL
Status: COMPLETED | OUTPATIENT
Start: 2024-04-11 | End: 2024-04-11

## 2024-04-11 RX ORDER — PREDNISONE 10 MG/1
TABLET ORAL
Qty: 30 TABLET | Refills: 0 | Status: SHIPPED | OUTPATIENT
Start: 2024-04-11 | End: 2024-06-10 | Stop reason: ALTCHOICE

## 2024-04-11 RX ADMIN — BETAMETHASONE SODIUM PHOSPHATE AND BETAMETHASONE ACETATE 2 ML: 3; 3 INJECTION, SUSPENSION INTRA-ARTICULAR; INTRALESIONAL; INTRAMUSCULAR at 08:45

## 2024-04-11 RX ADMIN — LIDOCAINE HYDROCHLORIDE 5 ML: 10 INJECTION, SOLUTION INFILTRATION; PERINEURAL at 08:45

## 2024-04-11 NOTE — PROGRESS NOTES
History of present illness: Patient combination of hip trochanteric bursitis low back mechanical pain on the right side    Also with right periscapular shoulder impingement pain for 3 months no real traumatic issues    In the past she had a left hip bursa shot with therapy and did well    Physical exam:    General: No acute distress or breathing difficulty or discomfort, pleasant and cooperative with the examination.    Extremities: The right shoulder was inspected and was found to have no obvious deformity.  There was tenderness to touch over the lateral edges of the shoulder over the rotator cuff insertion.  Active forward flexion 120 degrees, external rotation to 60 degrees, abduction to 50 degrees, and internal rotation to the level of L2.    At this time the shoulder is neurovascular tact and neurosensory intact.  Motor intact C5-T1.  There was no obvious neck pain or radiculopathy noted.  There was no gross instability or adhesive capsulitis symptoms.  There was no evidence of apprehension or apprehension suppression.    Strength was tested in 4 planes with weakness in the supraspinatus strength testing and external rotation position.  There was no strength deficit in internal rotation.  Impingement signs were positive both supine and standing for impingement test type I and II.  There was mild pain over the bicipital groove with a positive speeds sign    Before aspiration injection the benefits of a cortisone injection including infection, local skin irritation, skin atrophy, calcification, continued pain and discomfort, elevated blood sugar, burning, failure to relieve pain, possible late infection were discussed with the patient.    Postprocedure discomfort can be alleviated with additional medications, ice, elevation, rest over the first 24 hours as recommended.    Patient verbalized understanding and wanted to proceed with the planned procedure.    After informed consent was provided and allergies  verified, the patient was positioned appropriately on thel bed.  The right shoulder to be aspirated and injected was prepped and draped in a sterile fashion.  The skin was anesthetized with ethyl chloride spray.  A joint aspiration was to be performed an 18-gauge needle was used otherwise a 22-gauge needle was used to inject the appropriate joint.    Joint injection was performed with a mixture of 5 cc 1% lidocaine plain and 2 cc Celestone Soluspan 6 mg per mL.  The needle was removed and the puncture site closed and sealed with a Band-Aid.  The patient tolerated the procedure well.    Most of the pain on the right hip bursa is more over the low back SI joint    She can flex and abduct rotate well she is not really exquisitely tender over the tip of the trochanteric process so we will hold off on injecting the right hip    Diagnostic studies: X-rays show well-positioned hip joint with a small calcific spur off the greater troches right hip otherwise and unremarkable 2 views right shoulder with a little AC joint arthritis    Impression: Right rotator cuff tendinitis periscapular tendinitis    Mild hip trochanteric bursitis right    Low back mechanical pain due to arthritis    Plan: Injection right shoulder    PT for shoulder back and hip    Prednisone Dosepak for back and hip    Stretching rehab program can be beneficial I will see her back in 4 to 6 weeks if she fails to improve we will refer her to our spine team    With regard to the shoulder if she fails to improve MRI imaging of the shoulder would be in order  L Inj/Asp: R subacromial bursa on 4/11/2024 8:45 AM  Indications: pain  Details: 22 G needle, lateral approach  Medications: 2 mL betamethasone acet,sod phos 6 mg/mL; 5 mL lidocaine 10 mg/mL (1 %)  Outcome: tolerated well, no immediate complications  Procedure, treatment alternatives, risks and benefits explained, specific risks discussed. Consent was given by the patient. Immediately prior to procedure a  time out was called to verify the correct patient, procedure, equipment, support staff and site/side marked as required.

## 2024-04-27 DIAGNOSIS — R14.0 ABDOMINAL BLOATING: ICD-10-CM

## 2024-04-27 DIAGNOSIS — K21.9 GASTROESOPHAGEAL REFLUX DISEASE WITHOUT ESOPHAGITIS: ICD-10-CM

## 2024-04-27 DIAGNOSIS — R19.5 POSITIVE COLORECTAL CANCER SCREENING USING COLOGUARD TEST: Primary | ICD-10-CM

## 2024-04-27 LAB — NONINV COLON CA DNA+OCC BLD SCRN STL QL: POSITIVE

## 2024-04-29 ENCOUNTER — APPOINTMENT (OUTPATIENT)
Dept: PHYSICAL THERAPY | Facility: CLINIC | Age: 69
End: 2024-04-29
Payer: COMMERCIAL

## 2024-05-14 ENCOUNTER — APPOINTMENT (OUTPATIENT)
Dept: PHYSICAL THERAPY | Facility: CLINIC | Age: 69
End: 2024-05-14

## 2024-05-16 ENCOUNTER — APPOINTMENT (OUTPATIENT)
Dept: ORTHOPEDIC SURGERY | Facility: CLINIC | Age: 69
End: 2024-05-16
Payer: COMMERCIAL

## 2024-05-30 ENCOUNTER — OFFICE VISIT (OUTPATIENT)
Dept: ORTHOPEDIC SURGERY | Facility: CLINIC | Age: 69
End: 2024-05-30
Payer: MEDICARE

## 2024-05-30 DIAGNOSIS — M75.101 TEAR OF RIGHT ROTATOR CUFF, UNSPECIFIED TEAR EXTENT, UNSPECIFIED WHETHER TRAUMATIC: ICD-10-CM

## 2024-05-30 DIAGNOSIS — M70.61 TROCHANTERIC BURSITIS OF RIGHT HIP: Primary | ICD-10-CM

## 2024-05-30 PROCEDURE — 99213 OFFICE O/P EST LOW 20 MIN: CPT | Performed by: ORTHOPAEDIC SURGERY

## 2024-05-30 PROCEDURE — 20610 DRAIN/INJ JOINT/BURSA W/O US: CPT | Mod: RT | Performed by: ORTHOPAEDIC SURGERY

## 2024-05-30 PROCEDURE — 2500000005 HC RX 250 GENERAL PHARMACY W/O HCPCS: Performed by: ORTHOPAEDIC SURGERY

## 2024-05-30 PROCEDURE — 3008F BODY MASS INDEX DOCD: CPT | Performed by: ORTHOPAEDIC SURGERY

## 2024-05-30 PROCEDURE — 2500000004 HC RX 250 GENERAL PHARMACY W/ HCPCS (ALT 636 FOR OP/ED): Performed by: ORTHOPAEDIC SURGERY

## 2024-05-30 RX ORDER — BETAMETHASONE SODIUM PHOSPHATE AND BETAMETHASONE ACETATE 3; 3 MG/ML; MG/ML
2 INJECTION, SUSPENSION INTRA-ARTICULAR; INTRALESIONAL; INTRAMUSCULAR; SOFT TISSUE
Status: COMPLETED | OUTPATIENT
Start: 2024-05-30 | End: 2024-05-30

## 2024-05-30 RX ORDER — LIDOCAINE HYDROCHLORIDE 10 MG/ML
5 INJECTION INFILTRATION; PERINEURAL
Status: COMPLETED | OUTPATIENT
Start: 2024-05-30 | End: 2024-05-30

## 2024-05-30 RX ADMIN — BETAMETHASONE SODIUM PHOSPHATE AND BETAMETHASONE ACETATE 2 ML: 3; 3 INJECTION, SUSPENSION INTRA-ARTICULAR; INTRALESIONAL; INTRAMUSCULAR at 13:17

## 2024-05-30 RX ADMIN — LIDOCAINE HYDROCHLORIDE 5 ML: 10 INJECTION, SOLUTION INFILTRATION; PERINEURAL at 13:17

## 2024-05-30 NOTE — PROGRESS NOTES
History of present illness: Patient with a combination of low back pain lateral hip bursal pain also patient was seen and evaluated for right shoulder pain.    Recent cortisone shot in the shoulder therapy program gave her minimal relief    Oral prednisone did not help her back or hip or bursa    She has a remote history of an L4-5 fusion done in [facility] years ago    Physical exam:    General: No acute distress or breathing difficulty or discomfort, pleasant and cooperative with the examination.    Extremities: The affected left hip was examined and inspected.  There was tenderness to touch along the groin and over the lateral aspect of the hip over the bursal area.  Hip joint moves freely.    There was no pain over the groin area to internal/external rotation abduction.    Palpable reproducible pain was reproduced with palpation over the lateral trochanteric process.  There was a tight IT band with a positive Netta sign.      The skin was intact without breakdown or open wound.  Old incisions of present were all healed.  There was mild crepitus seen with internal and external rotation and range of motion without evidence of gross instability.    Inspection of the low back showed normal curvature integrity of the skin.  The strength and stability of the low back and ligaments were within normal limits.    There was a normal straight leg raise with no foot drop, numbness or tingling in the bilateral lower extremities.  Sensation, reflexes and pulses in the foot and ankle are preserved and present.  There was no obvious effusion.    Range of motion showed flexion to beyond 100 degrees degrees, abduction to 30 degrees, external rotation to 15 degrees and 18 degrees of internal rotation.  The patient had the ability to bear weight but with discomfort.  The patient's gait antalgic secondary to discomfort      Before aspiration injection the benefits of a cortisone injection including infection, local skin  irritation, skin atrophy, calcification, continued pain and discomfort, elevated blood sugar, burning, failure to relieve pain, possible late infection were discussed with the patient.    Postprocedure discomfort can be alleviated with additional medications, ice, elevation, rest over the first 24 hours as recommended.    Patient verbalized understanding and wanted to proceed with the planned procedure.    After informed consent was provided and allergies verified, the patient was positioned appropriately on the  bed.  The right hip to be aspirated and injected was prepped and draped in a sterile fashion.  The skin was anesthetized with ethyl chloride spray.  A joint aspiration was to be performed an 18-gauge needle was used otherwise a 22-gauge needle was used to inject the appropriate joint.      Joint injection was performed with a mixture of 5 cc 1% lidocaine plain and 2 cc Celestone Soluspan 6 mg per mL.  The needle was removed and the puncture site closed and sealed with a Band-Aid.  The patient tolerated the procedure well.    The right shoulder was inspected and was found to have no obvious deformity.  There was tenderness to touch over the lateral edges of the shoulder over the rotator cuff insertion.  Active forward flexion 110 degrees, external rotation to 50 degrees, abduction to 45 degrees, and internal rotation to the level of L2.    At this time the shoulder is neurovascular tact and neurosensory intact.  Motor intact C5-T1.  There was no obvious neck pain or radiculopathy noted.  There was no gross instability or adhesive capsulitis symptoms.  There was no evidence of apprehension or apprehension suppression.    Strength was tested in 4 planes with weakness in the supraspinatus strength testing and external rotation position.  There was no strength deficit in internal rotation.  Impingement signs were positive both supine and standing for impingement test type I and II.  There was mild pain over the  bicipital groove with a positive speeds sign    Diagnostic studies: No new study    Impression: Right shoulder probable rotator cuff tear failing conservative treatment with injections    Right hip trochanteric bursitis and ongoing the lumbar spine osteoarthritis    Plan: Injection right hip PT therapy stretching program    Routine visit with our spine team for evaluation of her low back    If her hip does not respond to injections she may need imaging of both back and her hip    In the meantime MRI of the right shoulder is ordered      L Inj/Asp: R greater trochanteric bursa on 5/30/2024 1:17 PM  Indications: pain and diagnostic evaluation  Details: 25 G needle, lateral approach  Medications: 5 mL lidocaine 10 mg/mL (1 %); 2 mL betamethasone acet,sod phos 6 mg/mL  Outcome: tolerated well, no immediate complications  Procedure, treatment alternatives, risks and benefits explained, specific risks discussed. Consent was given by the patient. Immediately prior to procedure a time out was called to verify the correct patient, procedure, equipment, support staff and site/side marked as required. Patient was prepped and draped in the usual sterile fashion.

## 2024-05-31 ENCOUNTER — ANESTHESIA EVENT (OUTPATIENT)
Dept: GASTROENTEROLOGY | Facility: EXTERNAL LOCATION | Age: 69
End: 2024-05-31

## 2024-05-31 PROBLEM — Z86.79 HISTORY OF HYPERTENSION: Status: ACTIVE | Noted: 2024-05-31

## 2024-05-31 PROBLEM — R30.0 DYSURIA: Status: ACTIVE | Noted: 2018-09-02

## 2024-05-31 PROBLEM — I10 ESSENTIAL (PRIMARY) HYPERTENSION: Status: ACTIVE | Noted: 2022-12-11

## 2024-05-31 PROBLEM — R53.1 WEAKNESS: Status: ACTIVE | Noted: 2022-12-11

## 2024-05-31 PROBLEM — H61.21 IMPACTED CERUMEN OF RIGHT EAR: Status: ACTIVE | Noted: 2024-05-31

## 2024-05-31 PROBLEM — H53.9 VISION DISORDER: Status: ACTIVE | Noted: 2024-05-31

## 2024-05-31 PROBLEM — Z86.16 PERSONAL HISTORY OF COVID-19: Status: ACTIVE | Noted: 2022-12-11

## 2024-05-31 PROBLEM — Z86.19 HISTORY OF HELICOBACTER PYLORI INFECTION: Status: ACTIVE | Noted: 2024-05-31

## 2024-06-03 ENCOUNTER — APPOINTMENT (OUTPATIENT)
Dept: PRIMARY CARE | Facility: CLINIC | Age: 69
End: 2024-06-03
Payer: COMMERCIAL

## 2024-06-10 ENCOUNTER — HOSPITAL ENCOUNTER (OUTPATIENT)
Dept: GASTROENTEROLOGY | Facility: EXTERNAL LOCATION | Age: 69
Discharge: HOME | End: 2024-06-10
Payer: MEDICARE

## 2024-06-10 ENCOUNTER — ANESTHESIA (OUTPATIENT)
Dept: GASTROENTEROLOGY | Facility: EXTERNAL LOCATION | Age: 69
End: 2024-06-10

## 2024-06-10 VITALS
HEIGHT: 63 IN | WEIGHT: 156 LBS | DIASTOLIC BLOOD PRESSURE: 80 MMHG | HEART RATE: 76 BPM | TEMPERATURE: 97.3 F | BODY MASS INDEX: 27.64 KG/M2 | OXYGEN SATURATION: 98 % | SYSTOLIC BLOOD PRESSURE: 137 MMHG | RESPIRATION RATE: 16 BRPM

## 2024-06-10 DIAGNOSIS — R14.0 ABDOMINAL BLOATING: ICD-10-CM

## 2024-06-10 DIAGNOSIS — K21.9 GASTROESOPHAGEAL REFLUX DISEASE WITHOUT ESOPHAGITIS: ICD-10-CM

## 2024-06-10 DIAGNOSIS — R19.5 POSITIVE COLORECTAL CANCER SCREENING USING COLOGUARD TEST: Primary | ICD-10-CM

## 2024-06-10 PROCEDURE — G0121 COLON CA SCRN NOT HI RSK IND: HCPCS | Performed by: INTERNAL MEDICINE

## 2024-06-10 PROCEDURE — 43239 EGD BIOPSY SINGLE/MULTIPLE: CPT | Performed by: INTERNAL MEDICINE

## 2024-06-10 PROCEDURE — 0753T DGTZ GLS MCRSCP SLD LEVEL IV: CPT | Mod: TC,ELYLAB | Performed by: INTERNAL MEDICINE

## 2024-06-10 RX ORDER — LIDOCAINE HYDROCHLORIDE 20 MG/ML
INJECTION, SOLUTION INFILTRATION; PERINEURAL AS NEEDED
Status: DISCONTINUED | OUTPATIENT
Start: 2024-06-10 | End: 2024-06-10

## 2024-06-10 RX ORDER — SODIUM CHLORIDE 9 MG/ML
20 INJECTION, SOLUTION INTRAVENOUS CONTINUOUS
Status: DISCONTINUED | OUTPATIENT
Start: 2024-06-10 | End: 2024-06-11 | Stop reason: HOSPADM

## 2024-06-10 RX ORDER — PROPOFOL 10 MG/ML
INJECTION, EMULSION INTRAVENOUS AS NEEDED
Status: DISCONTINUED | OUTPATIENT
Start: 2024-06-10 | End: 2024-06-10

## 2024-06-10 RX ORDER — ONDANSETRON HYDROCHLORIDE 2 MG/ML
4 INJECTION, SOLUTION INTRAVENOUS ONCE AS NEEDED
Status: DISCONTINUED | OUTPATIENT
Start: 2024-06-10 | End: 2024-06-11 | Stop reason: HOSPADM

## 2024-06-10 RX ORDER — ONDANSETRON HYDROCHLORIDE 2 MG/ML
INJECTION, SOLUTION INTRAVENOUS AS NEEDED
Status: DISCONTINUED | OUTPATIENT
Start: 2024-06-10 | End: 2024-06-10

## 2024-06-10 RX ADMIN — PROPOFOL 20 MCG: 10 INJECTION, EMULSION INTRAVENOUS at 14:00

## 2024-06-10 RX ADMIN — SODIUM CHLORIDE: 9 INJECTION, SOLUTION INTRAVENOUS at 13:52

## 2024-06-10 RX ADMIN — ONDANSETRON HYDROCHLORIDE 4 MG: 2 INJECTION, SOLUTION INTRAVENOUS at 14:15

## 2024-06-10 RX ADMIN — PROPOFOL 50 MCG: 10 INJECTION, EMULSION INTRAVENOUS at 14:15

## 2024-06-10 RX ADMIN — LIDOCAINE HYDROCHLORIDE 3 ML: 20 INJECTION, SOLUTION INFILTRATION; PERINEURAL at 13:58

## 2024-06-10 RX ADMIN — PROPOFOL 50 MCG: 10 INJECTION, EMULSION INTRAVENOUS at 14:02

## 2024-06-10 RX ADMIN — PROPOFOL 50 MCG: 10 INJECTION, EMULSION INTRAVENOUS at 14:05

## 2024-06-10 RX ADMIN — PROPOFOL 50 MCG: 10 INJECTION, EMULSION INTRAVENOUS at 14:09

## 2024-06-10 RX ADMIN — PROPOFOL 80 MCG: 10 INJECTION, EMULSION INTRAVENOUS at 13:58

## 2024-06-10 RX ADMIN — PROPOFOL 50 MCG: 10 INJECTION, EMULSION INTRAVENOUS at 14:20

## 2024-06-10 RX ADMIN — PROPOFOL 50 MCG: 10 INJECTION, EMULSION INTRAVENOUS at 14:27

## 2024-06-10 SDOH — HEALTH STABILITY: MENTAL HEALTH: CURRENT SMOKER: 0

## 2024-06-10 ASSESSMENT — PAIN SCALES - GENERAL
PAINLEVEL_OUTOF10: 0 - NO PAIN
PAINLEVEL_OUTOF10: 0 - NO PAIN
PAIN_LEVEL: 0
PAINLEVEL_OUTOF10: 0 - NO PAIN
PAINLEVEL_OUTOF10: 0 - NO PAIN

## 2024-06-10 ASSESSMENT — PAIN - FUNCTIONAL ASSESSMENT
PAIN_FUNCTIONAL_ASSESSMENT: 0-10

## 2024-06-10 ASSESSMENT — COLUMBIA-SUICIDE SEVERITY RATING SCALE - C-SSRS
2. HAVE YOU ACTUALLY HAD ANY THOUGHTS OF KILLING YOURSELF?: NO
6. HAVE YOU EVER DONE ANYTHING, STARTED TO DO ANYTHING, OR PREPARED TO DO ANYTHING TO END YOUR LIFE?: NO
1. IN THE PAST MONTH, HAVE YOU WISHED YOU WERE DEAD OR WISHED YOU COULD GO TO SLEEP AND NOT WAKE UP?: NO

## 2024-06-10 NOTE — ANESTHESIA POSTPROCEDURE EVALUATION
Patient: Aaron Gilbert    Procedure Summary       Date: 06/10/24 Room / Location: [facility]    Anesthesia Start: 1355 Anesthesia Stop: 1437    Procedures:       COLONOSCOPY      EGD Diagnosis:       Positive colorectal cancer screening using Cologuard test      Abdominal bloating      Gastroesophageal reflux disease without esophagitis      Positive colorectal cancer screening using Cologuard test    Scheduled Providers: Sam Watkins MD Responsible Provider: GUSTAVO Knox    Anesthesia Type: MAC ASA Status: 2            Anesthesia Type: MAC    Vitals Value Taken Time   /64 06/10/24 1435   Temp 36.3 °C (97.3 °F) 06/10/24 1435   Pulse 79 06/10/24 1435   Resp 14 06/10/24 1435   SpO2 98 % 06/10/24 1435       Anesthesia Post Evaluation    Patient location during evaluation: bedside  Patient participation: complete - patient participated  Level of consciousness: awake  Pain score: 0  Pain management: adequate  Airway patency: patent  Cardiovascular status: acceptable  Respiratory status: acceptable  Hydration status: acceptable  Postoperative Nausea and Vomiting: none    There were no known notable events for this encounter.

## 2024-06-10 NOTE — H&P
Outpatient Hospital Procedure H&P    Patient Profile-Procedures  Initial Info  Patient Demographics  Name Aaron Gilbert  Date of Birth 1955  MRN [id number]  Address   [address] [facility] [id number] [address]   [place] [address]    Primary Phone Number [phone]  Secondary Phone Number    PCP Juana Isaac    Procedure(s):  EGD, colonoscopy  Primary contact name and number   Extended Emergency Contact Information  Primary Emergency Contact: Radu Gilbert  Home Phone: [phone]  Relation: Spouse    General Health  Weight   Vitals:    06/10/24 1255   Weight: 70.8 kg (156 lb)     BMI Body mass index is 27.63 kg/m².    Allergies  Allergies   Allergen Reactions    Levonorgestrel-Ethinyl Estrad Anaphylaxis     Pt states this is on the wrong chart she has never taken this mediceine       Past Medical History   Past Medical History:   Diagnosis Date    Allergy status to unspecified drugs, medicaments and biological substances     History of seasonal allergies    Personal history of other diseases of the circulatory system 06/28/2021    History of hypertension    Personal history of other diseases of the digestive system     History of gastroesophageal reflux (GERD)    Personal history of other diseases of the musculoskeletal system and connective tissue 06/28/2021    History of arthritis    Personal history of other diseases of the respiratory system     History of sinusitis    Personal history of other infectious and parasitic diseases     History of Helicobacter pylori infection    Unspecified visual loss 06/28/2021    Vision problems       Provider assessment  Diagnosis: Epigastric pain, Positive Cologuard    Medication Reviewed - yes  Prior to Admission medications    Medication Sig Start Date End Date Taking? Authorizing Provider   acetaminophen (Tylenol) 500 mg tablet Take 1 tablet (500 mg) by mouth every 8 hours if needed. 6/27/23  Yes Historical Provider, MD   estradiol  (Estrace) 0.01 % (0.1 mg/gram) vaginal cream APPLY A PEA-SIZED AMOUNT DAILY 2/28/24  Yes Lokesh Fish MD   FLUoxetine (PROzac) 20 mg capsule Take 1 capsule (20 mg) by mouth once daily. 1/26/24  Yes Landon Wells MD   hydroCHLOROthiazide (HYDRODiuril) 25 mg tablet Take 1 tablet by mouth once daily 3/2/24  Yes Landon Wells MD   levocetirizine (Xyzal) 5 mg tablet Take by mouth. 3/3/21  Yes Historical Provider, MD   mometasone (Nasonex) 50 mcg/actuation nasal spray Administer 1 spray into each nostril 2 times a day. 1/6/20  Yes Historical Provider, MD   montelukast (Singulair) 10 mg tablet Take 1 tablet (10 mg) by mouth once daily. 2/1/24  Yes Landon Wells MD   pantoprazole (ProtoNix) 40 mg EC tablet Take 1 tablet (40 mg) by mouth once daily in the morning. Take before meals. 2/9/24  Yes Landon Wells MD   polyethylene glycol (Glycolax) 17 gram/dose powder MIX 1 CAPFUL IN 8 OUNCES OF WATER AND DRINK AT BEDTIME AS NEEDED FOR CONSTIPATION   Yes Historical Provider, MD   rosuvastatin (Crestor) 10 mg tablet TAKE 1 TABLET BY MOUTH AT BEDTIME 2/14/24  Yes Landon Wells MD   sucralfate (Carafate) 1 gram tablet Take 1 tablet (1 g) by mouth 3 times a day before meals. 12/1/23  Yes Landon Wells MD   predniSONE (Deltasone) 10 mg tablet 50MG FOR 2 DAYS, 40MG FOR 2 DAYS, 30MG FOR 2 DAYS, 20MG FOR 2 DAYS, 10MG FOR 2 DAYS 4/11/24 6/10/24 Yes Joey Cobb MD   busPIRone (Buspar) 15 mg tablet Take 1 tablet (15 mg) by mouth 3 times a day. 12/1/23 5/29/24  Landon Wells MD       Physical Exam  Vitals:    06/10/24 1255   Pulse: 75   Resp: 18   Temp: 36.2 °C (97.2 °F)        General: A&Ox3, NAD.  CV: RRR. No murmur.  Resp: CTA bilaterally. No wheezing, rhonchi or rales.   Extrem: No edema.       Oropharyngeal Classification II (hard and soft palate, upper portion of tonsils and uvula visible)  ASA PS Classification 2  Sedation Plan Deep  Procedure Plan - pre-procedural (re)assesment completed by  physician:  discharge/transfer patient when discharge criteria met    Sam Watkins MD  6/10/2024 1:53 PM

## 2024-06-10 NOTE — DISCHARGE INSTRUCTIONS
Patient Instructions Post Procedure      The anesthetics, sedatives or narcotics which were given to you today will be acting in your body for the next 24 hours, so you might feel a little sleepy or groggy.  This feeling should slowly wear off. Carefully read and follow the instructions.     You received sedation today:  - Do not drive or operate any machinery or power tools of any kind.   - No alcoholic beverages today, not even beer or wine.  - Do not make any important decisions or sign any legal documents.  - No over the counter medications that contain alcohol or that may cause drowsiness.    While it is common to experience mild to moderate abdominal distention, gas, or belching after your procedure, if any of these symptoms occur following discharge from the GI Lab or within one week of having your procedure, call the [facility] [place] to be advised whether a visit to your nearest Urgent Care or Emergency Department is indicated.  Take this paper with you if you go.   - If you develop an allergic reaction to the medications that were given during your procedure such as difficulty breathing, rash, hives, severe nausea, vomiting or lightheadedness.  - If you experience chest pain, shortness of breath, severe abdominal pain, fevers and chills.  -If you develop signs and symptoms of bleeding such as blood in your spit, if your stools turn black, tarry, or bloody  - If you have not urinated within 8 hours following your procedure.  - If your IV site becomes painful, red, inflamed, or looks infected.    If you received a biopsy/polypectomy/sphincterotomy the following instructions apply below:  __ Do not use Aspirin containing products, non-steroidal medications or anti-coagulants for one week following your procedure. (Examples of these types of medications are: Advil, Arthrotec, Aleve, Coumadin, Ecotrin, Heparin, Ibuprofen, Indocin, Motrin, Naprosyn, Nuprin, Plavix, Vioxx, and Voltarin, or their generic  forms.  This list is not all-inclusive.  Check with your physician or pharmacist before resuming medications.)   __ Eat a soft diet today.  Avoid foods that are poorly digested for the next 24 hours.  These foods would include: nuts, beans, lettuce, red meats, and fried foods. Start with liquids and advance your diet as tolerated, gradually work up to eating solids.   __ Do not have a Barium Study or Enema for one week.    Your physician recommends the additional following instructions:    -You have a contact number available for emergencies. The signs and symptoms of potential delayed complications were discussed with you. You may return to normal activities tomorrow.  -Resume your previous diet or other if specified.  -Continue your present medications.   -We are waiting for your pathology results, if applicable.  -The findings and recommendations have been discussed with you and/or family.  - Please see Medication Reconciliation Form for new medication/medications prescribed.     If you experience any problems or have any questions following discharge from the GI Lab, please call: [phone] from 7 am- 4:30 pm.  In the event of an emergency please go to the closest Emergency Department or call Dr. Watkins [phone] for any questions or concerns

## 2024-06-10 NOTE — ANESTHESIA PREPROCEDURE EVALUATION
Patient: Aaron Gilbert    Procedure Information       Date/Time: 06/10/24 1300    Scheduled providers: Sam Watkins MD    Procedures:       COLONOSCOPY      EGD    Location: [facility]            Relevant Problems   Cardiac   (+) Essential (primary) hypertension      Neuro   (+) Anxiety   (+) Depression      GI   (+) Gastroesophageal reflux disease without esophagitis   (+) Irritable bowel syndrome with predominant constipation      /Renal   (+) Kidney stones   (+) Recurrent UTI      Musculoskeletal   (+) Degenerative arthritis of lumbar spine   (+) Primary osteoarthritis involving multiple joints      HEENT   (+) Seasonal allergies      ID   (+) Recurrent UTI       Clinical information reviewed:   Tobacco  Allergies  Meds   Med Hx  Surg Hx  OB Status  Fam Hx  Soc   Hx        NPO Detail:  NPO/Void Status  Carbohydrate Drink Given Prior to Surgery? : N  Date of Last Liquid: 06/10/24  Time of Last Liquid: 0800  Date of Last Solid: 06/09/24  Time of Last Solid: 0900  Time of Last Void: 1257         Physical Exam    Airway  Mallampati: II  TM distance: >3 FB  Neck ROM: full     Cardiovascular - normal exam  Rhythm: regular  Rate: normal     Dental - normal exam     Pulmonary - normal exam  Breath sounds clear to auscultation     Abdominal - normal exam         Anesthesia Plan    History of general anesthesia?: yes  History of complications of general anesthesia?: no    ASA 2     MAC     The patient is not a current smoker.    intravenous induction   Anesthetic plan and risks discussed with patient.    Plan discussed with CRNA.

## 2024-06-13 ENCOUNTER — LAB (OUTPATIENT)
Dept: LAB | Facility: LAB | Age: 69
End: 2024-06-13
Payer: MEDICARE

## 2024-06-13 DIAGNOSIS — E66.3 OVERWEIGHT WITH BODY MASS INDEX (BMI) OF 29 TO 29.9 IN ADULT: ICD-10-CM

## 2024-06-13 LAB
ALBUMIN SERPL BCP-MCNC: 4.2 G/DL (ref 3.4–5)
ALP SERPL-CCNC: 38 U/L (ref 33–136)
ALT SERPL W P-5'-P-CCNC: 13 U/L (ref 7–45)
ANION GAP SERPL CALC-SCNC: 12 MMOL/L (ref 10–20)
AST SERPL W P-5'-P-CCNC: 16 U/L (ref 9–39)
BASOPHILS # BLD AUTO: 0.03 X10*3/UL (ref 0–0.1)
BASOPHILS NFR BLD AUTO: 0.6 %
BILIRUB SERPL-MCNC: 1 MG/DL (ref 0–1.2)
BUN SERPL-MCNC: 10 MG/DL (ref 6–23)
CALCIUM SERPL-MCNC: 8.8 MG/DL (ref 8.6–10.3)
CHLORIDE SERPL-SCNC: 102 MMOL/L (ref 98–107)
CO2 SERPL-SCNC: 26 MMOL/L (ref 21–32)
CREAT SERPL-MCNC: 0.84 MG/DL (ref 0.5–1.05)
EGFRCR SERPLBLD CKD-EPI 2021: 76 ML/MIN/1.73M*2
EOSINOPHIL # BLD AUTO: 0.16 X10*3/UL (ref 0–0.7)
EOSINOPHIL NFR BLD AUTO: 3.1 %
ERYTHROCYTE [DISTWIDTH] IN BLOOD BY AUTOMATED COUNT: 13.1 % (ref 11.5–14.5)
GLUCOSE SERPL-MCNC: 89 MG/DL (ref 74–99)
HCT VFR BLD AUTO: 38.5 % (ref 36–46)
HGB BLD-MCNC: 13 G/DL (ref 12–16)
IMM GRANULOCYTES # BLD AUTO: 0.01 X10*3/UL (ref 0–0.7)
IMM GRANULOCYTES NFR BLD AUTO: 0.2 % (ref 0–0.9)
LYMPHOCYTES # BLD AUTO: 1.42 X10*3/UL (ref 1.2–4.8)
LYMPHOCYTES NFR BLD AUTO: 27.5 %
MCH RBC QN AUTO: 32.7 PG (ref 26–34)
MCHC RBC AUTO-ENTMCNC: 33.8 G/DL (ref 32–36)
MCV RBC AUTO: 97 FL (ref 80–100)
MONOCYTES # BLD AUTO: 0.51 X10*3/UL (ref 0.1–1)
MONOCYTES NFR BLD AUTO: 9.9 %
NEUTROPHILS # BLD AUTO: 3.03 X10*3/UL (ref 1.2–7.7)
NEUTROPHILS NFR BLD AUTO: 58.7 %
NRBC BLD-RTO: 0 /100 WBCS (ref 0–0)
PLATELET # BLD AUTO: 245 X10*3/UL (ref 150–450)
POTASSIUM SERPL-SCNC: 3.4 MMOL/L (ref 3.5–5.3)
PROT SERPL-MCNC: 6.3 G/DL (ref 6.4–8.2)
RBC # BLD AUTO: 3.98 X10*6/UL (ref 4–5.2)
SODIUM SERPL-SCNC: 137 MMOL/L (ref 136–145)
WBC # BLD AUTO: 5.2 X10*3/UL (ref 4.4–11.3)

## 2024-06-13 PROCEDURE — 80053 COMPREHEN METABOLIC PANEL: CPT

## 2024-06-13 PROCEDURE — 36415 COLL VENOUS BLD VENIPUNCTURE: CPT

## 2024-06-13 PROCEDURE — 85025 COMPLETE CBC W/AUTO DIFF WBC: CPT

## 2024-06-19 ENCOUNTER — HOSPITAL ENCOUNTER (OUTPATIENT)
Dept: RADIOLOGY | Facility: CLINIC | Age: 69
Discharge: HOME | End: 2024-06-19
Payer: MEDICARE

## 2024-06-19 DIAGNOSIS — M75.101 TEAR OF RIGHT ROTATOR CUFF, UNSPECIFIED TEAR EXTENT, UNSPECIFIED WHETHER TRAUMATIC: ICD-10-CM

## 2024-06-19 LAB
LABORATORY COMMENT REPORT: NORMAL
PATH REPORT.FINAL DX SPEC: NORMAL
PATH REPORT.GROSS SPEC: NORMAL
PATH REPORT.TOTAL CANCER: NORMAL

## 2024-06-19 PROCEDURE — 73221 MRI JOINT UPR EXTREM W/O DYE: CPT | Mod: RIGHT SIDE | Performed by: STUDENT IN AN ORGANIZED HEALTH CARE EDUCATION/TRAINING PROGRAM

## 2024-06-19 PROCEDURE — 73221 MRI JOINT UPR EXTREM W/O DYE: CPT | Mod: RT

## 2024-06-26 ENCOUNTER — OFFICE VISIT (OUTPATIENT)
Dept: ORTHOPEDIC SURGERY | Facility: CLINIC | Age: 69
End: 2024-06-26
Payer: MEDICARE

## 2024-06-26 ENCOUNTER — HOSPITAL ENCOUNTER (OUTPATIENT)
Dept: RADIOLOGY | Facility: CLINIC | Age: 69
Discharge: HOME | End: 2024-06-26
Payer: MEDICARE

## 2024-06-26 DIAGNOSIS — M54.16 LUMBAR RADICULOPATHY, RIGHT: ICD-10-CM

## 2024-06-26 PROCEDURE — 99214 OFFICE O/P EST MOD 30 MIN: CPT | Performed by: PHYSICIAN ASSISTANT

## 2024-06-26 PROCEDURE — 72120 X-RAY BEND ONLY L-S SPINE: CPT

## 2024-06-26 PROCEDURE — 72110 X-RAY EXAM L-2 SPINE 4/>VWS: CPT | Performed by: ORTHOPAEDIC SURGERY

## 2024-06-26 PROCEDURE — 3008F BODY MASS INDEX DOCD: CPT | Performed by: PHYSICIAN ASSISTANT

## 2024-06-26 NOTE — PROGRESS NOTES
New patient to us established to the practice comes to the office complaining of low back pain that goes into the right lateral leg down the leg.  To the knee and sometimes past.  She saw Dr. Cobb she had a trochanteric bursitis was injected she says she got relief for about 2 to 3 days with that but but he seems to think is coming from the back.  She denies bowel or bladder complaints saddle anesthesia gait or balance changes.  Denies recent trauma.  She had a prior surgery about 20 years ago laminectomy at L4-5 did well after surgery and then had some issues and they talked about doing a fusion and she did not do that    Looked at patient's recent blood work.  Checked a metabolic panel glucose 89 sodium 137 potassium 3.4 we checked a vitamin D level which was 34.4 we checked a bone density study that was done in 2022 which is -2    Physical exam: Well-nourished, well kept.  No lymphangitis or lymphadenopathy in the examined extremities.  Good perfusion to the extremities ×4.  Radial and dorsalis pedis pulses 2+.  Capillary refill to all 4 digits brisk.  No distal edema x 4.  Gait normal.  Can walk on heels and toes.  Examination of the neck reveals no swelling, step-off, or point tenderness.  Range of motion with flexion, extension, side bending and rotation is well maintained without crepitance, instability, or exacerbation of pain.  Strength is within normal limits.  There is decreased range of motion flexion-extension rotation lumbar spine tender lumbar spinal musculature good strength no instability.  Examination of the upper extremities reveals no point tenderness, swelling, or deformity.  Range of motion of the shoulders, elbows, wrists, and fingers are all full without crepitance, instability, or exacerbation of pain.  Strength is 5/5 throughout.  Patient tender in the right greater trochanter otherwise examination of the lower extremities reveals no point tenderness, swelling, or deformity.  Range of  motion of the hips, knees, and ankles are full without crepitance, instability, or exacerbation of pain.  Strength is 5/5 throughout.  No redness, abrasions, or lesions on all 4 extremities, head and neck, or trunk.  Gross sensation intact in the extremities ×4.  Deep tendon reflexes 2+ and symmetric bilaterally.  Doyle, clonus, and Babinski were negative.  Straight leg raise negative.  Affect normal.  Alert and oriented ×3.  Coordination normal.    X-ray: X-ray lumbar spine taken today shows a scoliosis on AP I see spinous processes at all levels so laminectomy must have been a hemilaminectomy she has a scoliosis of 29 degrees she also has a lot of arthritic degenerative changes on the lateral x-ray no fractures dislocations or bony lytic lesions    Assessment: This a patient with low back pain arthritic degenerative changes I think there is a radicular component but she also has a bursitis which I think is that lateral pain that she had which she got relief on an injection.    Patient has been doing physical therapy chiropractic exercises at home she has handouts that she has been doing religiously and not helping.  Over-the-counter meds and prescription meds also.    Plan: Will get an MRI lumbar spine with and without contrast at [facility] for diagnostic purposes for maybe surgical intervention

## 2024-07-02 ENCOUNTER — HOSPITAL ENCOUNTER (OUTPATIENT)
Dept: RADIOLOGY | Facility: CLINIC | Age: 69
Discharge: HOME | End: 2024-07-02
Payer: MEDICARE

## 2024-07-02 ENCOUNTER — OFFICE VISIT (OUTPATIENT)
Dept: ORTHOPEDIC SURGERY | Facility: CLINIC | Age: 69
End: 2024-07-02
Payer: MEDICARE

## 2024-07-02 DIAGNOSIS — M25.532 LEFT WRIST PAIN: ICD-10-CM

## 2024-07-02 DIAGNOSIS — M77.8 EXTENSOR CARPI ULNARIS TENDINITIS: Primary | ICD-10-CM

## 2024-07-02 PROCEDURE — 99214 OFFICE O/P EST MOD 30 MIN: CPT | Performed by: ORTHOPAEDIC SURGERY

## 2024-07-02 PROCEDURE — 2500000004 HC RX 250 GENERAL PHARMACY W/ HCPCS (ALT 636 FOR OP/ED): Performed by: ORTHOPAEDIC SURGERY

## 2024-07-02 PROCEDURE — 1036F TOBACCO NON-USER: CPT | Performed by: ORTHOPAEDIC SURGERY

## 2024-07-02 PROCEDURE — 20550 NJX 1 TENDON SHEATH/LIGAMENT: CPT | Performed by: ORTHOPAEDIC SURGERY

## 2024-07-02 PROCEDURE — 1159F MED LIST DOCD IN RCRD: CPT | Performed by: ORTHOPAEDIC SURGERY

## 2024-07-02 PROCEDURE — 73110 X-RAY EXAM OF WRIST: CPT | Mod: LT

## 2024-07-02 PROCEDURE — 2500000005 HC RX 250 GENERAL PHARMACY W/O HCPCS: Performed by: ORTHOPAEDIC SURGERY

## 2024-07-02 PROCEDURE — 73110 X-RAY EXAM OF WRIST: CPT | Mod: LEFT SIDE | Performed by: ORTHOPAEDIC SURGERY

## 2024-07-02 PROCEDURE — 3008F BODY MASS INDEX DOCD: CPT | Performed by: ORTHOPAEDIC SURGERY

## 2024-07-02 RX ORDER — LIDOCAINE HYDROCHLORIDE 10 MG/ML
1 INJECTION INFILTRATION; PERINEURAL
Status: COMPLETED | OUTPATIENT
Start: 2024-07-02 | End: 2024-07-02

## 2024-07-02 NOTE — PROGRESS NOTES
7/2/2024    Chief Complaint   Patient presents with    Left Wrist - Pain, New Patient Visit     Xrays today        History of Present Illness:  Patient Aaron Gilbert , 69 y.o. female, presents today, 7/2/2024, for evaluation of left wrist pain and intermittent mechanical snapping .  She is right-hand dominant dividual.  Denies any discrete injury or trauma associate with symptom onset.  This originally began 2 months ago.  She has noticed intermittent soreness discomfort and occasional snapping sensation over the dorsal ulnar aspect of the left wrist with lifting and gripping activities.       Review of Systems:   GENERAL: Negative  GI: Negative  MUSCULOSKELETAL: See HPI  SKIN: Negative  NEURO:  Negative     Physical Exam:  GENERAL:  Alert and oriented to person, place, and time.  No acute distress and breathing comfortably; pleasant and cooperative with the examination.  HEENT:  Head is normocephalic and atraumatic.  NECK:  Supple, no visible swelling.  CARDIOVASCULAR:  No palpable tachycardia.  LUNGS:  No audible wheezing or labored breathing.  ABDOMEN:  Nondistended.  Extremities: Evaluation of left upper extremity finds the patient to have a palpable radial artery at the wrist with brisk capillary refill to all digits. The patient has intact sensorium to axillary, radial, median and ulnar nerves. There are no open wounds. There are no signs of infection. There is no evidence of lymphedema or lymphatic streaking. The patient has supple compartments of the left arm, forearm and hand.  She demonstrates mild tenderness palpation over the left dorsal ulnar wrist at the ECU tendon insertion extending approximately 2 cm proximally.  Minimal pain with resisted wrist extension.       Imaging/Test Results:  3 views of the left wrist taken in the office today show no acute fracture or dislocation.     Assessment:  Left wrist extensor carpi ulnaris tendinitis with suspected component of intermittent subluxation.      Plan:  Operative and nonoperative treatment strategies were discussed.  Recommendation made for initial nonoperative management occupational therapy referral for ECU tendinitis protocols and Kenalog injection and ECU tendon sheath.  This was performed in office today by Dr. Araya and tolerated well by patient.  Continue with activities weight-bear to tolerance in the left upper extremity.  Follow-up in 8 weeks for repeat clinical exam, no x-rays necessary upon return.  All questions answered at today's visit.    Hand / UE Inj/Asp for (L ECU tendon) on 7/2/2024 9:35 AM  Indications: pain and tendon swelling  Details: 25 G needle, radial approach  Medications: 10 mg triamcinolone acetonide 10 mg/mL; 1 mL lidocaine 10 mg/mL (1 %)  Outcome: tolerated well, no immediate complications    Left ECU tendon Injection: It was explained to the patient that the risks of a steroid injection include but are not limited to infection, local skin irritation, skin atrophy, calcification, continued pain and discomfort, elevation of blood sugar, burning, failure to relieve pain, and possible late infection. The patient verbalized good insight and verbalized consent for the injection. It was further explained that post injection discomfort can be alleviated with additional medications, ice, elevation, and rest over the first 24 hours, and these modalities are recommended.     Using aseptic technique, a solution containing 10 mg of Kenalog and 1 mL of 1% lidocaine without epinephrine was injected into the left wrist first dorsal compartment tendon sheath. This was done by palpating the radial styloid, and with active participation from the patient we were able to localize the tendons of the first dorsal compartment. The skin was prepped and the needle slowly advanced until the needle tip was in the tendon sheath. The solution was then slowly administered and the patient tolerated it well. A band-aid was placed. It should be noted that  ethyl chloride spray was used to make the injection delivery more comfortable for the patient.  Procedure, treatment alternatives, risks and benefits explained, specific risks discussed. Consent was given by the patient. Immediately prior to procedure a time out was called to verify the correct patient, procedure, equipment, support staff and site/side marked as required. Patient was prepped and draped in the usual sterile fashion.         In a face to face encounter, I performed a history and physical examination, discussed pertinent diagnostic studies if indicated, and discussed diagnosis and management strategies with both the patient and the mid-level provider. I reviewed the mid-level's note and agree with the documented findings and plan of care.  Patient presents today for evaluation of left ulnar-sided wrist pain.  On physical exam there is some hypermobility of the ECU tendon and tenderness over the distal course of the ECU tendon.  Treatment options were discussed.  Patient elects for trial of steroid injection to the ECU tendon and for formal therapy for ECU tendinitis.  Follow-up with me in 8 weeks.  No x-rays upon return.

## 2024-07-03 ENCOUNTER — OFFICE VISIT (OUTPATIENT)
Dept: ORTHOPEDIC SURGERY | Facility: CLINIC | Age: 69
End: 2024-07-03
Payer: MEDICARE

## 2024-07-03 DIAGNOSIS — M75.101 TEAR OF RIGHT ROTATOR CUFF, UNSPECIFIED TEAR EXTENT, UNSPECIFIED WHETHER TRAUMATIC: Primary | ICD-10-CM

## 2024-07-03 PROCEDURE — 1159F MED LIST DOCD IN RCRD: CPT | Performed by: ORTHOPAEDIC SURGERY

## 2024-07-03 PROCEDURE — 3008F BODY MASS INDEX DOCD: CPT | Performed by: ORTHOPAEDIC SURGERY

## 2024-07-03 PROCEDURE — 99213 OFFICE O/P EST LOW 20 MIN: CPT | Performed by: ORTHOPAEDIC SURGERY

## 2024-07-03 NOTE — PROGRESS NOTES
History of present illness: History of right shoulder pain    History of a rotator cuff repair by Dr. Hargrove years ago    Cortisone shot did not help much she follows up after MRI and therapy    Physical exam:    General: No acute distress or breathing difficulty or discomfort, pleasant and cooperative with the examination.    Extremities: The right shoulder was inspected and was found to have no obvious deformity.  There was tenderness to touch over the lateral edges of the shoulder over the rotator cuff insertion.  Active forward flexion 120 degrees, external rotation to 60 degrees, abduction to 50 degrees, and internal rotation to the level of L2.    At this time the shoulder is neurovascular tact and neurosensory intact.  Motor intact C5-T1.  There was no obvious neck pain or radiculopathy noted.  There was no gross instability or adhesive capsulitis symptoms.  There was no evidence of apprehension or apprehension suppression.    Strength was tested in 4 planes with weakness in the supraspinatus strength testing and external rotation position.  There was no strength deficit in internal rotation.  Impingement signs were positive both supine and standing for impingement test type I and II.  There was mild pain over the bicipital groove with a positive speeds sign        Diagnostic studies: MRI shows the rotator cuff repair to be intact    There are some mild bursitis some degenerative labral tearing and a little AC joint impingement right shoulder on MRI    Impression: Right shoulder cuff bursitis irritable but rotator cuff intact    Plan: Options are limited at this time PT therapy program    Cortisone injection already applied and tried    Rest ice elevation range of motion program    She fails to respond to outpatient arthroscopy subacromial decompression with probably debridement of some of the AC joint arthritis would be beneficial she understands there would be rehab with this    She understands that over  time arthritis can and will develop    She will think about her options she will call my  Fatimah to schedule the surgery if she so desires

## 2024-07-04 ENCOUNTER — APPOINTMENT (OUTPATIENT)
Dept: CARDIOLOGY | Facility: HOSPITAL | Age: 69
End: 2024-07-04
Payer: MEDICARE

## 2024-07-04 ENCOUNTER — HOSPITAL ENCOUNTER (EMERGENCY)
Facility: HOSPITAL | Age: 69
Discharge: HOME | End: 2024-07-05
Attending: EMERGENCY MEDICINE
Payer: MEDICARE

## 2024-07-04 ENCOUNTER — PATIENT MESSAGE (OUTPATIENT)
Dept: PRIMARY CARE | Facility: CLINIC | Age: 69
End: 2024-07-04

## 2024-07-04 ENCOUNTER — APPOINTMENT (OUTPATIENT)
Dept: RADIOLOGY | Facility: HOSPITAL | Age: 69
End: 2024-07-04
Payer: MEDICARE

## 2024-07-04 DIAGNOSIS — E78.00 ELEVATED CHOLESTEROL: Primary | ICD-10-CM

## 2024-07-04 DIAGNOSIS — R00.2 PALPITATIONS: ICD-10-CM

## 2024-07-04 DIAGNOSIS — I10 HYPERTENSION, UNSPECIFIED TYPE: Primary | ICD-10-CM

## 2024-07-04 LAB
BASOPHILS # BLD AUTO: 0.04 X10*3/UL (ref 0–0.1)
BASOPHILS NFR BLD AUTO: 0.4 %
EOSINOPHIL # BLD AUTO: 0.08 X10*3/UL (ref 0–0.7)
EOSINOPHIL NFR BLD AUTO: 0.9 %
ERYTHROCYTE [DISTWIDTH] IN BLOOD BY AUTOMATED COUNT: 12.6 % (ref 11.5–14.5)
HCT VFR BLD AUTO: 36.9 % (ref 36–46)
HGB BLD-MCNC: 12.8 G/DL (ref 12–16)
IMM GRANULOCYTES # BLD AUTO: 0.02 X10*3/UL (ref 0–0.7)
IMM GRANULOCYTES NFR BLD AUTO: 0.2 % (ref 0–0.9)
INR PPP: 1 (ref 0.9–1.1)
LYMPHOCYTES # BLD AUTO: 1.74 X10*3/UL (ref 1.2–4.8)
LYMPHOCYTES NFR BLD AUTO: 18.7 %
MCH RBC QN AUTO: 32.5 PG (ref 26–34)
MCHC RBC AUTO-ENTMCNC: 34.7 G/DL (ref 32–36)
MCV RBC AUTO: 94 FL (ref 80–100)
MONOCYTES # BLD AUTO: 0.79 X10*3/UL (ref 0.1–1)
MONOCYTES NFR BLD AUTO: 8.5 %
NEUTROPHILS # BLD AUTO: 6.63 X10*3/UL (ref 1.2–7.7)
NEUTROPHILS NFR BLD AUTO: 71.3 %
NRBC BLD-RTO: 0 /100 WBCS (ref 0–0)
PLATELET # BLD AUTO: 259 X10*3/UL (ref 150–450)
PROTHROMBIN TIME: 11.4 SECONDS (ref 9.8–12.8)
RBC # BLD AUTO: 3.94 X10*6/UL (ref 4–5.2)
WBC # BLD AUTO: 9.3 X10*3/UL (ref 4.4–11.3)

## 2024-07-04 PROCEDURE — 80053 COMPREHEN METABOLIC PANEL: CPT | Performed by: EMERGENCY MEDICINE

## 2024-07-04 PROCEDURE — 83735 ASSAY OF MAGNESIUM: CPT | Performed by: EMERGENCY MEDICINE

## 2024-07-04 PROCEDURE — 83605 ASSAY OF LACTIC ACID: CPT | Performed by: EMERGENCY MEDICINE

## 2024-07-04 PROCEDURE — 85025 COMPLETE CBC W/AUTO DIFF WBC: CPT | Performed by: EMERGENCY MEDICINE

## 2024-07-04 PROCEDURE — 84484 ASSAY OF TROPONIN QUANT: CPT | Performed by: EMERGENCY MEDICINE

## 2024-07-04 PROCEDURE — 93005 ELECTROCARDIOGRAM TRACING: CPT

## 2024-07-04 PROCEDURE — 85610 PROTHROMBIN TIME: CPT | Performed by: EMERGENCY MEDICINE

## 2024-07-04 PROCEDURE — 71045 X-RAY EXAM CHEST 1 VIEW: CPT

## 2024-07-04 PROCEDURE — 71045 X-RAY EXAM CHEST 1 VIEW: CPT | Performed by: RADIOLOGY

## 2024-07-04 PROCEDURE — 99283 EMERGENCY DEPT VISIT LOW MDM: CPT | Mod: 25

## 2024-07-04 PROCEDURE — 83880 ASSAY OF NATRIURETIC PEPTIDE: CPT | Performed by: EMERGENCY MEDICINE

## 2024-07-04 ASSESSMENT — COLUMBIA-SUICIDE SEVERITY RATING SCALE - C-SSRS
6. HAVE YOU EVER DONE ANYTHING, STARTED TO DO ANYTHING, OR PREPARED TO DO ANYTHING TO END YOUR LIFE?: NO
2. HAVE YOU ACTUALLY HAD ANY THOUGHTS OF KILLING YOURSELF?: NO
1. IN THE PAST MONTH, HAVE YOU WISHED YOU WERE DEAD OR WISHED YOU COULD GO TO SLEEP AND NOT WAKE UP?: NO

## 2024-07-04 ASSESSMENT — PAIN DESCRIPTION - PAIN TYPE: TYPE: CHRONIC PAIN

## 2024-07-04 ASSESSMENT — LIFESTYLE VARIABLES
EVER HAD A DRINK FIRST THING IN THE MORNING TO STEADY YOUR NERVES TO GET RID OF A HANGOVER: NO
HAVE YOU EVER FELT YOU SHOULD CUT DOWN ON YOUR DRINKING: NO
EVER FELT BAD OR GUILTY ABOUT YOUR DRINKING: NO
TOTAL SCORE: 0
HAVE PEOPLE ANNOYED YOU BY CRITICIZING YOUR DRINKING: NO

## 2024-07-04 ASSESSMENT — PAIN SCALES - GENERAL: PAINLEVEL_OUTOF10: 8

## 2024-07-04 ASSESSMENT — PAIN DESCRIPTION - LOCATION: LOCATION: BACK

## 2024-07-04 ASSESSMENT — PAIN - FUNCTIONAL ASSESSMENT: PAIN_FUNCTIONAL_ASSESSMENT: 0-10

## 2024-07-05 ENCOUNTER — APPOINTMENT (OUTPATIENT)
Dept: CARDIOLOGY | Facility: HOSPITAL | Age: 69
End: 2024-07-05
Payer: MEDICARE

## 2024-07-05 ENCOUNTER — TELEPHONE (OUTPATIENT)
Dept: PRIMARY CARE | Facility: CLINIC | Age: 69
End: 2024-07-05
Payer: MEDICARE

## 2024-07-05 VITALS
WEIGHT: 160 LBS | HEIGHT: 63 IN | BODY MASS INDEX: 28.35 KG/M2 | SYSTOLIC BLOOD PRESSURE: 144 MMHG | HEART RATE: 59 BPM | TEMPERATURE: 98.2 F | OXYGEN SATURATION: 97 % | RESPIRATION RATE: 16 BRPM | DIASTOLIC BLOOD PRESSURE: 77 MMHG

## 2024-07-05 LAB
ALBUMIN SERPL BCP-MCNC: 4.2 G/DL (ref 3.4–5)
ALP SERPL-CCNC: 38 U/L (ref 33–136)
ALT SERPL W P-5'-P-CCNC: 9 U/L (ref 7–45)
ANION GAP SERPL CALC-SCNC: 12 MMOL/L (ref 10–20)
AST SERPL W P-5'-P-CCNC: 15 U/L (ref 9–39)
ATRIAL RATE: 58 BPM
ATRIAL RATE: 59 BPM
BILIRUB SERPL-MCNC: 0.8 MG/DL (ref 0–1.2)
BNP SERPL-MCNC: 51 PG/ML (ref 0–99)
BUN SERPL-MCNC: 21 MG/DL (ref 6–23)
CALCIUM SERPL-MCNC: 9.1 MG/DL (ref 8.6–10.3)
CARDIAC TROPONIN I PNL SERPL HS: 4 NG/L (ref 0–13)
CARDIAC TROPONIN I PNL SERPL HS: 4 NG/L (ref 0–13)
CHLORIDE SERPL-SCNC: 101 MMOL/L (ref 98–107)
CO2 SERPL-SCNC: 24 MMOL/L (ref 21–32)
CREAT SERPL-MCNC: 0.81 MG/DL (ref 0.5–1.05)
EGFRCR SERPLBLD CKD-EPI 2021: 79 ML/MIN/1.73M*2
GLUCOSE SERPL-MCNC: 102 MG/DL (ref 74–99)
LACTATE SERPL-SCNC: 0.7 MMOL/L (ref 0.4–2)
MAGNESIUM SERPL-MCNC: 1.86 MG/DL (ref 1.6–2.4)
P AXIS: 44 DEGREES
P AXIS: 47 DEGREES
P OFFSET: 181 MS
P OFFSET: 181 MS
P ONSET: 120 MS
P ONSET: 120 MS
POTASSIUM SERPL-SCNC: 3.2 MMOL/L (ref 3.5–5.3)
PR INTERVAL: 196 MS
PR INTERVAL: 196 MS
PROT SERPL-MCNC: 6.5 G/DL (ref 6.4–8.2)
Q ONSET: 218 MS
Q ONSET: 218 MS
QRS COUNT: 10 BEATS
QRS COUNT: 9 BEATS
QRS DURATION: 86 MS
QRS DURATION: 90 MS
QT INTERVAL: 424 MS
QT INTERVAL: 432 MS
QTC CALCULATION(BAZETT): 419 MS
QTC CALCULATION(BAZETT): 424 MS
QTC FREDERICIA: 421 MS
QTC FREDERICIA: 426 MS
R AXIS: 11 DEGREES
R AXIS: 15 DEGREES
SODIUM SERPL-SCNC: 134 MMOL/L (ref 136–145)
T AXIS: 17 DEGREES
T AXIS: 30 DEGREES
T OFFSET: 430 MS
T OFFSET: 434 MS
VENTRICULAR RATE: 58 BPM
VENTRICULAR RATE: 59 BPM

## 2024-07-05 PROCEDURE — 84484 ASSAY OF TROPONIN QUANT: CPT | Performed by: EMERGENCY MEDICINE

## 2024-07-05 PROCEDURE — 93005 ELECTROCARDIOGRAM TRACING: CPT

## 2024-07-05 PROCEDURE — 2500000002 HC RX 250 W HCPCS SELF ADMINISTERED DRUGS (ALT 637 FOR MEDICARE OP, ALT 636 FOR OP/ED): Performed by: STUDENT IN AN ORGANIZED HEALTH CARE EDUCATION/TRAINING PROGRAM

## 2024-07-05 PROCEDURE — 36415 COLL VENOUS BLD VENIPUNCTURE: CPT | Performed by: EMERGENCY MEDICINE

## 2024-07-05 RX ORDER — POTASSIUM CHLORIDE 20 MEQ/1
40 TABLET, EXTENDED RELEASE ORAL ONCE
Status: COMPLETED | OUTPATIENT
Start: 2024-07-05 | End: 2024-07-05

## 2024-07-05 RX ORDER — EZETIMIBE 10 MG/1
10 TABLET ORAL
Qty: 30 TABLET | Refills: 0 | Status: SHIPPED | OUTPATIENT
Start: 2024-07-05 | End: 2024-08-04

## 2024-07-05 NOTE — ED PROVIDER NOTES
"HPI   Chief Complaint   Patient presents with    Hypertension     \"My BP was 166 on top at home.\"        HPI: 69-year-old female presents stating that she was having blood pressure issues today.  She states that she checks her blood pressure every day.  She states that she takes hydrochlorothiazide for her blood pressure.  She states that it was as high as 200 today.  She states that she had some palpitations but denies any chest pain.  She states that she does not have palpitations now she does not have chest pain.  She states that she took an extra hydrochlorothiazide today and it is coming down.  She denies any abdominal pain.  She denies any numbness tingling or weakness.  She denies any headaches.  She denies any dizziness or syncope.  She denies any blood in her urine blood in her stools or dark tarry stools.    Family HX: Denies any significant/pertinent family history.  Social Hx: Denies ETOH or drug use.  Review of systems:  Gen.: No weight loss, fatigue, anorexia, insomnia, fever.   Eyes: No vision loss, double vision, drainage, eye pain.   ENT: No pharyngitis, dry mouth.   Cardiac: No chest pain, palpitations, syncope, near syncope.   Pulmonary: No shortness of breath, cough, hemoptysis.   Heme/lymph: No swollen glands, fever, bleeding.   GI: No abdominal pain, change in bowel habits, melena, hematemesis, hematochezia, nausea, vomiting, diarrhea.   : No discharge, dysuria, frequency, urgency, hematuria.   Musculoskeletal: No limb pain, joint pain, joint swelling.   Skin: No rashes.   Psych: No depression, anxiety, suicidality, homicidality.   Review of systems is otherwise negative unless stated above or in history of present illness.    Physical Exam:    Appearance: Alert, oriented , cooperative,  in no acute distress. Well nourished & well hydrated.    Skin: Intact,  dry skin, no lesions, rash, petechiae or purpura.     Eyes: PERRLA, EOMs intact,  Conjunctiva pink with no redness or exudates. Eyelids " without lesions. No scleral icterus.     ENT: Hearing grossly intact. External auditory canals patent, tympanic membranes intact with visible landmarks. Nares patent, mucus membranes moist. Dentition without lesions. Pharynx clear, uvula midline.     Neck: Supple, without meningismus. Thyroid not palpable. Trachea at midline. No lymphadenopathy.    Pulmonary: Clear bilaterally with good chest wall excursion. No rales, rhonchi or wheezing. No accessory muscle use or stridor.    Cardiac: Normal S1, S2 without murmur, rub, gallop or extrasystole. No JVD, Carotids without bruits.    Abdomen: Soft, nontender, active bowel sounds.  No palpable organomegaly.  No rebound or guarding.  No CVA tenderness.    Genitourinary: Exam deferred.    Musculoskeletal: Full range of motion. no pain, edema, or deformity. Pulses full and equal. No cyanosis, clubbing, or edema.    Neurological:  Cranial nerves II through XII are grossly intact, finger-nose touch is normal, normal sensation, no weakness, no focal findings identified.  NIH stroke scale is 0.  She has a narrow-base steady gait and a negative test of skew    Psychiatric: Appropriate mood and affect.     Medical Decision-Making:  Testing: EKG was obtained which is interpreted by me shows a sinus bradycardic rhythm rate of 59 without evidence of obvious ST elevations or T wave inversions indicate acute ischemia or infarct.  Blood pressures currently 176 systolic.  Will obtain labs.  We will check for leukocytosis as well as anemia as well as electrolytes as well and is troponin.  Treatment:   Reevaluation:   Plan: Homegoing. Discussed differential. Will follow-up with the primary physician in the next 2-3 days. Return if worse. They understand return precautions and discharge instructions. Patient and family/friend/caregiver are in agreement with this plan.   Impression:   1.  Hypertension  2.                              Jose Antonio Coma Scale Score: 15                     Patient  History   Past Medical History:   Diagnosis Date    Allergy status to unspecified drugs, medicaments and biological substances     History of seasonal allergies    Personal history of other diseases of the circulatory system 06/28/2021    History of hypertension    Personal history of other diseases of the digestive system     History of gastroesophageal reflux (GERD)    Personal history of other diseases of the musculoskeletal system and connective tissue 06/28/2021    History of arthritis    Personal history of other diseases of the respiratory system     History of sinusitis    Personal history of other infectious and parasitic diseases     History of Helicobacter pylori infection    Unspecified visual loss 06/28/2021    Vision problems     Past Surgical History:   Procedure Laterality Date    OTHER SURGICAL HISTORY  06/28/2021    Tonsillectomy    OTHER SURGICAL HISTORY  06/28/2021    Back surgery    OTHER SURGICAL HISTORY  05/28/2019    Esophagogastroduodenoscopy    OTHER SURGICAL HISTORY  05/28/2019    Complete colonoscopy     Family History   Problem Relation Name Age of Onset    Hypertension Other       Social History     Tobacco Use    Smoking status: Never     Passive exposure: Never    Smokeless tobacco: Never   Substance Use Topics    Alcohol use: Not on file    Drug use: Not on file       Physical Exam   ED Triage Vitals [07/04/24 2252]   Temperature Heart Rate Respirations BP   36.8 °C (98.2 °F) 66 17 176/89      Pulse Ox Temp Source Heart Rate Source Patient Position   98 % Temporal Monitor Sitting      BP Location FiO2 (%)     Right arm --       Physical Exam    ED Course & MDM   Diagnoses as of 07/04/24 2337   Hypertension, unspecified type       Medical Decision Making      Procedure  Procedures     Maureen Soto MD  07/04/24 5677

## 2024-07-05 NOTE — TELEPHONE ENCOUNTER
JEREMIE CALLED IN TO SCHEDULE A HOSPITAL FOLLOW UP WITH BB, SHE WAS SEEN FOR HIGH BP ON 7/4. BB IS COMPLETELY BOOKED. PLEASE ADVISE IF WE CAN ADD HER TO HIS SCHEDULE?    AVAILABLE ANY DATE/TIME!

## 2024-07-05 NOTE — PROGRESS NOTES
"Emergency Medicine Transition of Care Note.    I received Aaron Gilbert in signout from Dr. Soto.  Please see the previous ED provider note for all HPI, PE and MDM up to the time of signout at 0000 hrs. This is in addition to the primary record.    In brief Aaron Gilbert is an 69 y.o. female presenting for   Chief Complaint   Patient presents with    Hypertension     \"My BP was 166 on top at home.\"      At the time of signout we were awaiting: labs, CXR, re-eval    Diagnoses as of 07/05/24 0151   Hypertension, unspecified type   Palpitations       Medical Decision Making  My evaluation the patient is resting position of comfort complaining of feeling tired but denies any chest pain difficulty breathing or palpitations.  CBC reviewed and unremarkable.  Magnesium levels normal.  Troponins negative x 2.  BNP is not elevated.  Lactate levels not elevated.  Metabolic panel with minor hypokalemia 3.2 and replacement was ordered.  Chest x-ray with right perihilar and left basilar opacities.  Patient is not having any fevers productive cough or difficulty breathing.  I do not believe this represents pneumonia.  Patient is saturating normally on room air.  Lungs are clear to auscultation bilaterally.  Patient was advised follow-up with her doctors and return to the emergency department any new or worsening symptoms.  All questions were answered.  Patient was appreciative care and agreeable to discharge.    Amount and/or Complexity of Data Reviewed  Labs: ordered. Decision-making details documented in ED Course.  Radiology: ordered. Decision-making details documented in ED Course.  ECG/medicine tests: independent interpretation performed.     Details: 0047 hrs.: Sinus rhythm with a ventricular rate of 58 bpm.  QRS interval 86 ms.  QTc 424 ms.  Normal axis.  No acute ischemic injury pattern noted.      Labs Reviewed   CBC WITH AUTO DIFFERENTIAL - Abnormal       Result Value    WBC 9.3      nRBC 0.0      RBC 3.94 (*)     " Hemoglobin 12.8      Hematocrit 36.9      MCV 94      MCH 32.5      MCHC 34.7      RDW 12.6      Platelets 259      Neutrophils % 71.3      Immature Granulocytes %, Automated 0.2      Lymphocytes % 18.7      Monocytes % 8.5      Eosinophils % 0.9      Basophils % 0.4      Neutrophils Absolute 6.63      Immature Granulocytes Absolute, Automated 0.02      Lymphocytes Absolute 1.74      Monocytes Absolute 0.79      Eosinophils Absolute 0.08      Basophils Absolute 0.04     COMPREHENSIVE METABOLIC PANEL - Abnormal    Glucose 102 (*)     Sodium 134 (*)     Potassium 3.2 (*)     Chloride 101      Bicarbonate 24      Anion Gap 12      Urea Nitrogen 21      Creatinine 0.81      eGFR 79      Calcium 9.1      Albumin 4.2      Alkaline Phosphatase 38      Total Protein 6.5      AST 15      Bilirubin, Total 0.8      ALT 9     MAGNESIUM - Normal    Magnesium 1.86     LACTATE - Normal    Lactate 0.7      Narrative:     Venipuncture immediately after or during the administration of Metamizole may lead to falsely low results. Testing should be performed immediately  prior to Metamizole dosing.   PROTIME-INR - Normal    Protime 11.4      INR 1.0     B-TYPE NATRIURETIC PEPTIDE - Normal    BNP 51      Narrative:        <100 pg/mL - Heart failure unlikely  100-299 pg/mL - Intermediate probability of acute heart                  failure exacerbation. Correlate with clinical                  context and patient history.    >=300 pg/mL - Heart Failure likely. Correlate with clinical                  context and patient history.    BNP testing is performed using different testing methodology at [facility] than at other [facility] hospitals. Direct result comparisons should only be made within the same method.      SERIAL TROPONIN-INITIAL - Normal    Troponin I, High Sensitivity 4      Narrative:     Less than 99th percentile of normal range cutoff-  Female and children under 18 years old <14 ng/L; Male <21 ng/L: Negative  Repeat  testing should be performed if clinically indicated.     Female and children under 18 years old 14-50 ng/L; Male 21-50 ng/L:  Consistent with possible cardiac damage and possible increased clinical   risk. Serial measurements may help to assess extent of myocardial damage.     >50 ng/L: Consistent with cardiac damage, increased clinical risk and  myocardial infarction. Serial measurements may help assess extent of   myocardial damage.      NOTE: Children less than 1 year old may have higher baseline troponin   levels and results should be interpreted in conjunction with the overall   clinical context.     NOTE: Troponin I testing is performed using a different   testing methodology at [facility] than at other   [facility]. Direct result comparisons should only   be made within the same method.   SERIAL TROPONIN, 1 HOUR - Normal    Troponin I, High Sensitivity 4      Narrative:     Less than 99th percentile of normal range cutoff-  Female and children under 18 years old <14 ng/L; Male <21 ng/L: Negative  Repeat testing should be performed if clinically indicated.     Female and children under 18 years old 14-50 ng/L; Male 21-50 ng/L:  Consistent with possible cardiac damage and possible increased clinical   risk. Serial measurements may help to assess extent of myocardial damage.     >50 ng/L: Consistent with cardiac damage, increased clinical risk and  myocardial infarction. Serial measurements may help assess extent of   myocardial damage.      NOTE: Children less than 1 year old may have higher baseline troponin   levels and results should be interpreted in conjunction with the overall   clinical context.     NOTE: Troponin I testing is performed using a different   testing methodology at [facility] than at other   [facility]. Direct result comparisons should only   be made within the same method.   TROPONIN SERIES- (INITIAL, 1 HR)    Narrative:     The following orders were  created for panel order Troponin I Series, High Sensitivity (0, 1 HR).  Procedure                               Abnormality         Status                     ---------                               -----------         ------                     Troponin I, High Sensiti...[[id number]]  Normal              Final result               Troponin, High Sensitivi...[[id number]]  Normal              Final result                 Please view results for these tests on the individual orders.     XR chest 1 view   Final Result   1. Right perihilar and left basilar opacities which may represent   mild edema or volume loss versus infiltrate.             Signed by: Stevo Harris 7/5/2024 12:04 AM   Dictation workstation:   [id number]            Final diagnoses:   [I10] Hypertension, unspecified type   [R00.2] Palpitations           Procedure  Procedures    Steffen Kerr DO

## 2024-07-05 NOTE — DISCHARGE INSTRUCTIONS
Please follow up with your Primary Care Provider (PCP) within the next 2-3 days regarding your ED visit.  Seek immediate medical attention if you develop: worsening chest pain, new chest pain, nausea, vomiting, weakness, numbness, tingling, excessive sweating, shortness of breath, difficulty breathing, loss of motion in your arms or legs, or any new or worsening symptoms.  These documents have a lot of useful information! Please read them, so you know what to expect, what you can do for yourself at home, and also to know concerning signs warrant a return to the Emergency Department for additional evaluation.  You are welcome back any time. Thank you for entrusting your care to us, I hope we made your visit as pleasant as possible. Wishing you well!    Dr. Kerr

## 2024-07-06 DIAGNOSIS — F43.23 ADJUSTMENT DISORDER WITH MIXED ANXIETY AND DEPRESSED MOOD: ICD-10-CM

## 2024-07-08 RX ORDER — FLUOXETINE 10 MG/1
CAPSULE ORAL
Qty: 60 CAPSULE | Refills: 2 | Status: SHIPPED | OUTPATIENT
Start: 2024-07-08

## 2024-07-11 ENCOUNTER — APPOINTMENT (OUTPATIENT)
Dept: PRIMARY CARE | Facility: CLINIC | Age: 69
End: 2024-07-11
Payer: MEDICARE

## 2024-07-11 ENCOUNTER — TELEPHONE (OUTPATIENT)
Dept: PRIMARY CARE | Facility: CLINIC | Age: 69
End: 2024-07-11

## 2024-07-11 NOTE — TELEPHONE ENCOUNTER
Aaron called in and had to cancel her ER follow up appointment she had scheduled for 7/11. She would like to reschedule this appointment but asked if there were any openings prior to the end of August. Please advise.

## 2024-07-13 LAB
ATRIAL RATE: 58 BPM
ATRIAL RATE: 59 BPM
P AXIS: 44 DEGREES
P AXIS: 47 DEGREES
P OFFSET: 181 MS
P OFFSET: 181 MS
P ONSET: 120 MS
P ONSET: 120 MS
PR INTERVAL: 196 MS
PR INTERVAL: 196 MS
Q ONSET: 218 MS
Q ONSET: 218 MS
QRS COUNT: 10 BEATS
QRS COUNT: 9 BEATS
QRS DURATION: 86 MS
QRS DURATION: 90 MS
QT INTERVAL: 424 MS
QT INTERVAL: 432 MS
QTC CALCULATION(BAZETT): 419 MS
QTC CALCULATION(BAZETT): 424 MS
QTC FREDERICIA: 421 MS
QTC FREDERICIA: 426 MS
R AXIS: 11 DEGREES
R AXIS: 15 DEGREES
T AXIS: 17 DEGREES
T AXIS: 30 DEGREES
T OFFSET: 430 MS
T OFFSET: 434 MS
VENTRICULAR RATE: 58 BPM
VENTRICULAR RATE: 59 BPM

## 2024-07-15 DIAGNOSIS — E78.00 ELEVATED CHOLESTEROL: Primary | ICD-10-CM

## 2024-07-15 DIAGNOSIS — I10 ESSENTIAL (PRIMARY) HYPERTENSION: ICD-10-CM

## 2024-07-15 RX ORDER — VALSARTAN 80 MG/1
80 TABLET ORAL DAILY
Qty: 30 TABLET | Refills: 3 | Status: SHIPPED | OUTPATIENT
Start: 2024-07-15 | End: 2024-11-12

## 2024-07-17 ENCOUNTER — APPOINTMENT (OUTPATIENT)
Dept: RADIOLOGY | Facility: CLINIC | Age: 69
End: 2024-07-17
Payer: MEDICARE

## 2024-07-17 NOTE — TELEPHONE ENCOUNTER
Is there any openings that BB will let us schedule patient in at this moment? If not patient can be called and notified of availability.

## 2024-07-24 ENCOUNTER — TELEPHONE (OUTPATIENT)
Dept: PRIMARY CARE | Facility: CLINIC | Age: 69
End: 2024-07-24
Payer: MEDICARE

## 2024-07-24 NOTE — TELEPHONE ENCOUNTER
We received a request for prior authorization on the patient's pitavastatin magnesium 4 mg tablet  from their pharmacy. Prior authorization was submitted to insurance today. We will await their determination.

## 2024-07-24 NOTE — TELEPHONE ENCOUNTER
Prior Authorization  has been APPROVED.    Approval valid through 12.31.2024  Approval letter scanned into media on 07.24.2024

## 2024-07-25 ENCOUNTER — HOSPITAL ENCOUNTER (OUTPATIENT)
Dept: RADIOLOGY | Facility: CLINIC | Age: 69
Discharge: HOME | End: 2024-07-25
Payer: MEDICARE

## 2024-07-25 DIAGNOSIS — M54.16 LUMBAR RADICULOPATHY, RIGHT: ICD-10-CM

## 2024-07-25 PROCEDURE — 2500000004 HC RX 250 GENERAL PHARMACY W/ HCPCS (ALT 636 FOR OP/ED): Performed by: PHYSICIAN ASSISTANT

## 2024-07-25 PROCEDURE — 72158 MRI LUMBAR SPINE W/O & W/DYE: CPT

## 2024-07-25 PROCEDURE — A9575 INJ GADOTERATE MEGLUMI 0.1ML: HCPCS | Performed by: PHYSICIAN ASSISTANT

## 2024-07-25 RX ORDER — GADOTERATE MEGLUMINE 376.9 MG/ML
16 INJECTION INTRAVENOUS
Status: COMPLETED | OUTPATIENT
Start: 2024-07-25 | End: 2024-07-25

## 2024-07-29 DIAGNOSIS — J01.90 ACUTE NON-RECURRENT SINUSITIS, UNSPECIFIED LOCATION: Primary | ICD-10-CM

## 2024-07-29 RX ORDER — AMOXICILLIN 875 MG/1
875 TABLET, FILM COATED ORAL 2 TIMES DAILY
Qty: 20 TABLET | Refills: 0 | Status: SHIPPED | OUTPATIENT
Start: 2024-07-29 | End: 2024-08-08

## 2024-07-29 RX ORDER — FLUTICASONE PROPIONATE 50 MCG
2 SPRAY, SUSPENSION (ML) NASAL DAILY
Qty: 16 G | Refills: 0 | Status: SHIPPED | OUTPATIENT
Start: 2024-07-29 | End: 2025-07-29

## 2024-07-31 DIAGNOSIS — K58.1 IRRITABLE BOWEL SYNDROME WITH PREDOMINANT CONSTIPATION: ICD-10-CM

## 2024-07-31 DIAGNOSIS — F41.9 ANXIETY: ICD-10-CM

## 2024-08-01 RX ORDER — PANTOPRAZOLE SODIUM 40 MG/1
40 TABLET, DELAYED RELEASE ORAL
Qty: 30 TABLET | Refills: 5 | Status: SHIPPED | OUTPATIENT
Start: 2024-08-01

## 2024-08-01 RX ORDER — BUSPIRONE HYDROCHLORIDE 15 MG/1
15 TABLET ORAL 3 TIMES DAILY
Qty: 90 TABLET | Refills: 5 | Status: SHIPPED | OUTPATIENT
Start: 2024-08-01

## 2024-08-13 DIAGNOSIS — J01.91 ACUTE RECURRENT SINUSITIS, UNSPECIFIED LOCATION: Primary | ICD-10-CM

## 2024-08-14 RX ORDER — CEFDINIR 300 MG/1
300 CAPSULE ORAL 2 TIMES DAILY
Qty: 20 CAPSULE | Refills: 0 | Status: SHIPPED | OUTPATIENT
Start: 2024-08-14 | End: 2024-08-24

## 2024-08-21 ENCOUNTER — APPOINTMENT (OUTPATIENT)
Dept: ORTHOPEDIC SURGERY | Facility: CLINIC | Age: 69
End: 2024-08-21
Payer: MEDICARE

## 2024-08-26 ENCOUNTER — APPOINTMENT (OUTPATIENT)
Dept: PRIMARY CARE | Facility: CLINIC | Age: 69
End: 2024-08-26
Payer: MEDICARE

## 2024-08-27 ENCOUNTER — APPOINTMENT (OUTPATIENT)
Dept: ORTHOPEDIC SURGERY | Facility: CLINIC | Age: 69
End: 2024-08-27
Payer: MEDICARE

## 2024-09-09 ENCOUNTER — APPOINTMENT (OUTPATIENT)
Dept: PRIMARY CARE | Facility: CLINIC | Age: 69
End: 2024-09-09
Payer: MEDICARE

## 2024-09-09 VITALS
HEIGHT: 63 IN | OXYGEN SATURATION: 96 % | DIASTOLIC BLOOD PRESSURE: 78 MMHG | TEMPERATURE: 97.3 F | HEART RATE: 72 BPM | RESPIRATION RATE: 16 BRPM | WEIGHT: 156 LBS | BODY MASS INDEX: 27.64 KG/M2 | SYSTOLIC BLOOD PRESSURE: 136 MMHG

## 2024-09-09 DIAGNOSIS — R35.0 URINARY FREQUENCY: ICD-10-CM

## 2024-09-09 DIAGNOSIS — Z11.59 NEED FOR HEPATITIS C SCREENING TEST: ICD-10-CM

## 2024-09-09 DIAGNOSIS — Z00.00 ROUTINE GENERAL MEDICAL EXAMINATION AT HEALTH CARE FACILITY: Primary | ICD-10-CM

## 2024-09-09 DIAGNOSIS — G44.86 CERVICOGENIC HEADACHE: ICD-10-CM

## 2024-09-09 DIAGNOSIS — R30.0 DYSURIA: ICD-10-CM

## 2024-09-09 DIAGNOSIS — M46.1 SACROILIITIS (CMS-HCC): ICD-10-CM

## 2024-09-09 DIAGNOSIS — Z12.31 ENCOUNTER FOR SCREENING MAMMOGRAM FOR MALIGNANT NEOPLASM OF BREAST: ICD-10-CM

## 2024-09-09 DIAGNOSIS — I10 ESSENTIAL (PRIMARY) HYPERTENSION: ICD-10-CM

## 2024-09-09 DIAGNOSIS — R39.15 URINARY URGENCY: ICD-10-CM

## 2024-09-09 DIAGNOSIS — E87.6 HYPOKALEMIA: ICD-10-CM

## 2024-09-09 DIAGNOSIS — R53.83 FATIGUE, UNSPECIFIED TYPE: ICD-10-CM

## 2024-09-09 DIAGNOSIS — D64.9 MILD ANEMIA: ICD-10-CM

## 2024-09-09 LAB
APPEARANCE UR: CLEAR
BILIRUB UR STRIP.AUTO-MCNC: NEGATIVE MG/DL
COLOR UR: COLORLESS
GLUCOSE UR STRIP.AUTO-MCNC: NORMAL MG/DL
KETONES UR STRIP.AUTO-MCNC: NEGATIVE MG/DL
LEUKOCYTE ESTERASE UR QL STRIP.AUTO: NEGATIVE
NITRITE UR QL STRIP.AUTO: NEGATIVE
PH UR STRIP.AUTO: 5 [PH]
PROT UR STRIP.AUTO-MCNC: NEGATIVE MG/DL
RBC # UR STRIP.AUTO: NEGATIVE /UL
SP GR UR STRIP.AUTO: 1.01
UROBILINOGEN UR STRIP.AUTO-MCNC: NORMAL MG/DL

## 2024-09-09 PROCEDURE — 81003 URINALYSIS AUTO W/O SCOPE: CPT

## 2024-09-09 PROCEDURE — G0439 PPPS, SUBSEQ VISIT: HCPCS | Performed by: FAMILY MEDICINE

## 2024-09-09 PROCEDURE — 1159F MED LIST DOCD IN RCRD: CPT | Performed by: FAMILY MEDICINE

## 2024-09-09 PROCEDURE — 1170F FXNL STATUS ASSESSED: CPT | Performed by: FAMILY MEDICINE

## 2024-09-09 PROCEDURE — 3008F BODY MASS INDEX DOCD: CPT | Performed by: FAMILY MEDICINE

## 2024-09-09 PROCEDURE — 99214 OFFICE O/P EST MOD 30 MIN: CPT | Performed by: FAMILY MEDICINE

## 2024-09-09 PROCEDURE — 1160F RVW MEDS BY RX/DR IN RCRD: CPT | Performed by: FAMILY MEDICINE

## 2024-09-09 PROCEDURE — 3078F DIAST BP <80 MM HG: CPT | Performed by: FAMILY MEDICINE

## 2024-09-09 PROCEDURE — 3075F SYST BP GE 130 - 139MM HG: CPT | Performed by: FAMILY MEDICINE

## 2024-09-09 PROCEDURE — 87086 URINE CULTURE/COLONY COUNT: CPT

## 2024-09-09 PROCEDURE — 1158F ADVNC CARE PLAN TLK DOCD: CPT | Performed by: FAMILY MEDICINE

## 2024-09-09 PROCEDURE — 1123F ACP DISCUSS/DSCN MKR DOCD: CPT | Performed by: FAMILY MEDICINE

## 2024-09-09 PROCEDURE — 1036F TOBACCO NON-USER: CPT | Performed by: FAMILY MEDICINE

## 2024-09-09 ASSESSMENT — PATIENT HEALTH QUESTIONNAIRE - PHQ9
SUM OF ALL RESPONSES TO PHQ9 QUESTIONS 1 AND 2: 0
2. FEELING DOWN, DEPRESSED OR HOPELESS: NOT AT ALL
1. LITTLE INTEREST OR PLEASURE IN DOING THINGS: NOT AT ALL
SUM OF ALL RESPONSES TO PHQ9 QUESTIONS 1 AND 2: 0
1. LITTLE INTEREST OR PLEASURE IN DOING THINGS: NOT AT ALL
2. FEELING DOWN, DEPRESSED OR HOPELESS: NOT AT ALL

## 2024-09-09 ASSESSMENT — ACTIVITIES OF DAILY LIVING (ADL)
DOING_HOUSEWORK: INDEPENDENT
BATHING: INDEPENDENT
DRESSING: INDEPENDENT
GROCERY_SHOPPING: INDEPENDENT
MANAGING_FINANCES: INDEPENDENT
TAKING_MEDICATION: INDEPENDENT

## 2024-09-09 NOTE — PROGRESS NOTES
"Subjective   Reason for Visit: Aaron Gilbert is an 69 y.o. female here for a Medicare Wellness visit.     Past Medical, Surgical, and Family History reviewed and updated in chart.    Reviewed all medications by prescribing practitioner or clinical pharmacist (such as prescriptions, OTCs, herbal therapies and supplements) and documented in the medical record.    HPI    Patient Care Team:  Landon Wells MD as PCP - General     Review of Systems    Objective   Vitals:  /78 (BP Location: Right arm, Patient Position: Sitting, BP Cuff Size: Adult long)   Pulse 72   Temp 36.3 °C (97.3 °F)   Resp 16   Ht 1.6 m (5' 2.99\")   Wt 70.8 kg (156 lb)   SpO2 96%   BMI 27.64 kg/m²       Physical Exam    Assessment & Plan  Routine general medical examination at health care facility    Orders:    1 Year Follow Up In [facility] - PCP - Wellness Exam; Future    Urinary frequency    Orders:    Urinalysis with Reflex Microscopic; Future    Urine Culture; Future    Urine Culture    Urinalysis with Reflex Microscopic    Hypokalemia    Orders:    Basic metabolic panel; Future    Mild anemia    Orders:    CBC and Auto Differential; Future    Fatigue, unspecified type    Orders:    TSH with reflex to Free T4 if abnormal; Future    Cervicogenic headache    Orders:    Referral to Neurology; Future    Essential (primary) hypertension         Urinary urgency         Dysuria         Need for hepatitis C screening test    Orders:    Hepatitis C antibody; Future    Encounter for screening mammogram for malignant neoplasm of breast    Orders:    BI mammo bilateral screening tomosynthesis; Future    Sacroiliitis (CMS-HCC)  Sacroiliitis is stable.  Patient to continue with current medications and treatment plan.  Follow-up at least annually.                   "

## 2024-09-09 NOTE — PROGRESS NOTES
Subjective   Patient ID: Aaron Gilbert is a 69 y.o. female who presents for Follow-up (Irritable bowel syndrome with predominant constipation; Anxiety; Gastroesophageal reflux disease without esophagitis/) and Medicare Annual Wellness Visit Subsequent. I last saw the patient  2/1/2023    HPI     Patient is here for a F/U    Says she has been low on potassium. Patient  noted cramping int he calves at night.    And her right side back has been hurting for 3-4 weeks, she is unsure if it is kidney related. She mentions having burning micturition and increased frequency of urination. She mentions drinking a lot of water and Cranberry juice.    Pt does not want flu shot today   Past medical, surgical, and family history reviewed.  Reviewed and documented all medications   Pt eating well, exercising as tolerated and taking medications as directed      Review of Systems  Except positives as noted in the CC & HPI      Constitutional: Denies fevers, chills, night sweats, fatigue, weight changes, change in appetite    Eyes: Denies blurry vision, double vision    ENT: Denies otalgia, trouble hearing, tinnitus, vertigo, nasal congestion, rhinorrhea, sore throat    Neck: Denies swelling, masses    Cardiovascular: Denies chest pain, palpitations, edema, orthopnea, syncope    Respiratory: Denies dyspnea, cough, wheezing, postural nocturnal dyspnea    Gastrointestinal: Denies abdominal pain, nausea, vomiting, diarrhea, constipation, melena, hematochezia    Genitourinary: Denies dysuria, hematuria, frequency, urgency    Musculoskeletal: Denies back pain, neck pain, arthralgias, myalgias    Integumentary: Denies skin lesions, rashes, masses    Neurological: Denies dizziness, headaches, confusion, limb weakness, paresthesias, syncope, convulsions    Psychiatric: Denies depression, homicidal ideations, suicidal ideations, sleep disturbances    Endocrine: Denies polyphagia, polydipsia, polyuria, weakness, hair thinning, heat  "intolerance, cold intolerance, weight changes    Heme/Lymph: Denies easy bruising, easy bleeding, swollen glands    Objective   /78 (BP Location: Right arm, Patient Position: Sitting, BP Cuff Size: Adult long)   Pulse 72   Temp 36.3 °C (97.3 °F)   Resp 16   Ht 1.6 m (5' 2.99\")   Wt 70.8 kg (156 lb)   SpO2 96%   BMI 27.64 kg/m²     Physical Exam    136/78 on recheck of BP in the right arm.     Gen. Appearance - well-developed, well-nourished, 69 y.o., White female in no acute distress.     Skin - warm, pink and dry without rash or concerning lesions.     Mental Status - alert and oriented times 3. Normal mood and affect appropriate to mood.     Neck - supple without lymphadenopathy. Carotid pulses are normal without bruits. Thyroid is normal in midline without nodules.    Chest - lungs are clear to auscultation without rales, rhonchi or wheezes.     Heart - regular, rate, and rhythm without murmurs, rubs or gallops.     Abdomen - soft, mildly obese, nontender, nondistended. No masses, hepatomegaly or splenomegaly is noted. No rebound, rigidity or guarding is noted. Bowel sounds are normoactive.     Extremities - no cyanosis, clubbing or edema. Pedal pulses are 2+ normal at the dorsalis pedis and posterior tibial pulses bilaterally.     Neurological - cranial nerves II through XII are grossly intact. Motor strength 5/5 at all fours.     Assessment/Plan   1. Routine general medical examination at health care facility  1 Year Follow Up In [facility] - PCP - Wellness Exam      2. Urinary frequency  Urinalysis with Reflex Microscopic    Urine Culture    Urine Culture    Urinalysis with Reflex Microscopic      3. Hypokalemia  Basic metabolic panel      4. Mild anemia  CBC and Auto Differential      5. Fatigue, unspecified type  TSH with reflex to Free T4 if abnormal      6. Cervicogenic headache  Referral to Neurology      7. Essential (primary) hypertension        8. Urinary urgency        9. " Sacroiliitis (CMS-HCC)        10. Dysuria        11. Need for hepatitis C screening test  Hepatitis C antibody      12. Encounter for screening mammogram for malignant neoplasm of breast  BI mammo bilateral screening tomosynthesis        Patient to continue current medications (with any exceptions as noted) and diet. Follow-up in 6 month(s) otherwise as needed.      Patient is to return for fasting CBC, BMP, TSH with reflex free T4 if abnormal, A1c, PSA  labs at their convenience prior to their next appointment. Fasting is nothing to eat or drink except water or black coffee for 8-12 hours. Will call patient with results when available.     Ordered Urinalysis with Reflex Microscopic and Urine Culture. Will call patient with results when available.      Mammogram ordered for yearly breast cancer screening. Will call patient with results when available.     Ordered Hepatitis C antibody for Hepatitis C screening. Will call patient with results when available.      A new referral to neurologist has been sent for headache. Patient declined referral to PT and neck X-rays at this time.    Scribe Attestation  By signing my name below, Lula HERNANDEZ Scribe   attest that this documentation has been prepared under the direction and in the presence of Landon Wells MD.

## 2024-09-10 ENCOUNTER — APPOINTMENT (OUTPATIENT)
Dept: ORTHOPEDIC SURGERY | Facility: CLINIC | Age: 69
End: 2024-09-10
Payer: MEDICARE

## 2024-09-10 ENCOUNTER — LAB (OUTPATIENT)
Dept: LAB | Facility: LAB | Age: 69
End: 2024-09-10
Payer: MEDICARE

## 2024-09-10 DIAGNOSIS — D64.9 MILD ANEMIA: ICD-10-CM

## 2024-09-10 DIAGNOSIS — E87.6 HYPOKALEMIA: ICD-10-CM

## 2024-09-10 DIAGNOSIS — R53.83 FATIGUE, UNSPECIFIED TYPE: ICD-10-CM

## 2024-09-10 DIAGNOSIS — Z11.59 NEED FOR HEPATITIS C SCREENING TEST: ICD-10-CM

## 2024-09-10 LAB
ANION GAP SERPL CALC-SCNC: 13 MMOL/L (ref 10–20)
BACTERIA UR CULT: NORMAL
BASOPHILS # BLD AUTO: 0.04 X10*3/UL (ref 0–0.1)
BASOPHILS NFR BLD AUTO: 0.9 %
BUN SERPL-MCNC: 10 MG/DL (ref 6–23)
CALCIUM SERPL-MCNC: 9.2 MG/DL (ref 8.6–10.3)
CHLORIDE SERPL-SCNC: 104 MMOL/L (ref 98–107)
CO2 SERPL-SCNC: 28 MMOL/L (ref 21–32)
CREAT SERPL-MCNC: 0.81 MG/DL (ref 0.5–1.05)
EGFRCR SERPLBLD CKD-EPI 2021: 79 ML/MIN/1.73M*2
EOSINOPHIL # BLD AUTO: 0.22 X10*3/UL (ref 0–0.7)
EOSINOPHIL NFR BLD AUTO: 5.1 %
ERYTHROCYTE [DISTWIDTH] IN BLOOD BY AUTOMATED COUNT: 13.1 % (ref 11.5–14.5)
GLUCOSE SERPL-MCNC: 76 MG/DL (ref 74–99)
HCT VFR BLD AUTO: 40.8 % (ref 36–46)
HGB BLD-MCNC: 12.9 G/DL (ref 12–16)
IMM GRANULOCYTES # BLD AUTO: 0.01 X10*3/UL (ref 0–0.7)
IMM GRANULOCYTES NFR BLD AUTO: 0.2 % (ref 0–0.9)
LYMPHOCYTES # BLD AUTO: 1.4 X10*3/UL (ref 1.2–4.8)
LYMPHOCYTES NFR BLD AUTO: 32.2 %
MCH RBC QN AUTO: 31.9 PG (ref 26–34)
MCHC RBC AUTO-ENTMCNC: 31.6 G/DL (ref 32–36)
MCV RBC AUTO: 101 FL (ref 80–100)
MONOCYTES # BLD AUTO: 0.49 X10*3/UL (ref 0.1–1)
MONOCYTES NFR BLD AUTO: 11.3 %
NEUTROPHILS # BLD AUTO: 2.19 X10*3/UL (ref 1.2–7.7)
NEUTROPHILS NFR BLD AUTO: 50.3 %
NRBC BLD-RTO: 0 /100 WBCS (ref 0–0)
PLATELET # BLD AUTO: 274 X10*3/UL (ref 150–450)
POTASSIUM SERPL-SCNC: 4.2 MMOL/L (ref 3.5–5.3)
RBC # BLD AUTO: 4.05 X10*6/UL (ref 4–5.2)
SODIUM SERPL-SCNC: 141 MMOL/L (ref 136–145)
TSH SERPL-ACNC: 0.87 MIU/L (ref 0.44–3.98)
WBC # BLD AUTO: 4.4 X10*3/UL (ref 4.4–11.3)

## 2024-09-10 PROCEDURE — 85025 COMPLETE CBC W/AUTO DIFF WBC: CPT

## 2024-09-10 PROCEDURE — 86803 HEPATITIS C AB TEST: CPT

## 2024-09-10 PROCEDURE — 84443 ASSAY THYROID STIM HORMONE: CPT

## 2024-09-10 PROCEDURE — 80048 BASIC METABOLIC PNL TOTAL CA: CPT

## 2024-09-10 PROCEDURE — 36415 COLL VENOUS BLD VENIPUNCTURE: CPT

## 2024-09-10 NOTE — ASSESSMENT & PLAN NOTE
Sacroiliitis is stable.  Patient to continue with current medications and treatment plan.  Follow-up at least annually.

## 2024-09-11 LAB — HCV AB SER QL: NONREACTIVE

## 2024-09-18 ENCOUNTER — OFFICE VISIT (OUTPATIENT)
Dept: ORTHOPEDIC SURGERY | Facility: CLINIC | Age: 69
End: 2024-09-18
Payer: MEDICARE

## 2024-09-18 DIAGNOSIS — M54.16 LUMBAR RADICULOPATHY, RIGHT: Primary | ICD-10-CM

## 2024-09-18 PROCEDURE — 1123F ACP DISCUSS/DSCN MKR DOCD: CPT | Performed by: PHYSICIAN ASSISTANT

## 2024-09-18 PROCEDURE — 1036F TOBACCO NON-USER: CPT | Performed by: PHYSICIAN ASSISTANT

## 2024-09-18 PROCEDURE — 1159F MED LIST DOCD IN RCRD: CPT | Performed by: PHYSICIAN ASSISTANT

## 2024-09-18 PROCEDURE — 99213 OFFICE O/P EST LOW 20 MIN: CPT | Performed by: PHYSICIAN ASSISTANT

## 2024-09-18 NOTE — PROGRESS NOTES
Established patient follow-up lumbar MRI patient was having lateral low back pain right sided down the right leg to the knee but nothing really distal she has had injections in the past in her neck and back it did not seem really helped.  No bowel or bladder complaints no saddle anesthesia.  She also had a bone density study back in 2002 which is negative to    Physical exam: Well-nourished, well kept.  No lymphangitis or lymphadenopathy in the examined extremities.  Good perfusion to the lower extremities bilaterally.  Dorsalis pedis pulses 2+.  Capillary refill to the digits brisk.  No distal edema.  Gait normal.  Can walk on heels and toes.  Decreased range of motion flexion extension rotation lumbar spine tender good strength no instability.   examination of the lower extremities reveals no point tenderness, swelling, or deformity.  Range of motion of the hips, knees, and ankles are full without crepitance, instability, or exacerbation of pain.  Strength is 5/5 throughout.  No redness, abrasions, or lesions on the lower extremities bilaterally.  Gross sensation intact to the lower extremity is bilaterally.  Affect normal.    MRI: MRI reviewed report was reviewed as well showing a transitional vertebrae.  For counting purposes there is a spondylolisthesis anterior at the L3-4 level.  Any mild posterior subluxation of L4-5.  L1-2 shows some moderate narrowing on the right.  Some central narrowing also.  L2-3 level shows moderate central stenosis moderate bilateral foraminal narrowing.  L3-4 moderate central stenosis severe right foraminal narrowing.  L4-5 shows moderate central narrowing.    Assessment: Talk about treatment options continue physical therapy conservative care maybe try some more injections.  Or surgical intervention which would be a big surgery.  I explained to her surgery would probably maybe be a lateral approach interbody fusion at the L2-3 level that would help correct the scoliosis which was  seen on x-ray.  He would also need an interbody fusion at the L3-4 due to that spondylolisthesis.  We would have to flip the patient central decompression at multiple levels.  An interbody fusion like extended at L3-4 with multiple levels of instrumentation.  We discussed all those risks and benefits with her.  She said she would like to maybe try injections again.    Plan: I will get the patient to see pain management for L2-3 and L3 4 injections on the right see if it helps.  And if she fails that and wants to consider surgery she will see Dr. Simpson for surgical plan

## 2024-09-20 DIAGNOSIS — I10 ESSENTIAL (PRIMARY) HYPERTENSION: ICD-10-CM

## 2024-09-20 RX ORDER — VALSARTAN 80 MG/1
80 TABLET ORAL DAILY
Qty: 30 TABLET | Refills: 3 | Status: SHIPPED | OUTPATIENT
Start: 2024-09-20 | End: 2025-01-18

## 2024-09-20 NOTE — TELEPHONE ENCOUNTER
Rx Refill Request Telephone Encounter    Name:  Aaron Gilbert  :  [id number]  Medication Name:  VALSARTAN 80MG   Specific Pharmacy location:  [facility]   Date of last appointment:  24  Date of next appointment: PT NOT AVAILABLE!  Best number to reach patient: [phone]

## 2024-09-24 ENCOUNTER — APPOINTMENT (OUTPATIENT)
Dept: OCCUPATIONAL THERAPY | Facility: CLINIC | Age: 69
End: 2024-09-24
Payer: MEDICARE

## 2024-09-24 DIAGNOSIS — K21.9 GASTROESOPHAGEAL REFLUX DISEASE WITHOUT ESOPHAGITIS: ICD-10-CM

## 2024-09-24 RX ORDER — SUCRALFATE 1 G/1
1 TABLET ORAL
Qty: 90 TABLET | Refills: 6 | Status: SHIPPED | OUTPATIENT
Start: 2024-09-24

## 2024-10-01 ENCOUNTER — APPOINTMENT (OUTPATIENT)
Dept: RADIOLOGY | Facility: HOSPITAL | Age: 69
End: 2024-10-01
Payer: MEDICARE

## 2024-10-01 ENCOUNTER — APPOINTMENT (OUTPATIENT)
Dept: CARDIOLOGY | Facility: HOSPITAL | Age: 69
End: 2024-10-01
Payer: MEDICARE

## 2024-10-01 ENCOUNTER — APPOINTMENT (OUTPATIENT)
Dept: PHYSICAL THERAPY | Facility: CLINIC | Age: 69
End: 2024-10-01
Payer: MEDICARE

## 2024-10-01 ENCOUNTER — HOSPITAL ENCOUNTER (EMERGENCY)
Dept: CARDIOLOGY | Facility: HOSPITAL | Age: 69
Discharge: HOME | End: 2024-10-01
Payer: MEDICARE

## 2024-10-01 ENCOUNTER — HOSPITAL ENCOUNTER (EMERGENCY)
Facility: HOSPITAL | Age: 69
Discharge: HOME | End: 2024-10-01
Attending: EMERGENCY MEDICINE
Payer: MEDICARE

## 2024-10-01 VITALS
SYSTOLIC BLOOD PRESSURE: 147 MMHG | TEMPERATURE: 96.6 F | HEIGHT: 63 IN | HEART RATE: 54 BPM | WEIGHT: 155 LBS | OXYGEN SATURATION: 98 % | RESPIRATION RATE: 13 BRPM | DIASTOLIC BLOOD PRESSURE: 72 MMHG | BODY MASS INDEX: 27.46 KG/M2

## 2024-10-01 DIAGNOSIS — R51.9 NONINTRACTABLE HEADACHE, UNSPECIFIED CHRONICITY PATTERN, UNSPECIFIED HEADACHE TYPE: Primary | ICD-10-CM

## 2024-10-01 DIAGNOSIS — R00.1 SINUS BRADYCARDIA: ICD-10-CM

## 2024-10-01 LAB
ALBUMIN SERPL BCP-MCNC: 4.1 G/DL (ref 3.4–5)
ALP SERPL-CCNC: 56 U/L (ref 33–136)
ALT SERPL W P-5'-P-CCNC: 8 U/L (ref 7–45)
ANION GAP SERPL CALC-SCNC: 9 MMOL/L (ref 10–20)
APPEARANCE UR: CLEAR
AST SERPL W P-5'-P-CCNC: 12 U/L (ref 9–39)
BASOPHILS # BLD AUTO: 0.04 X10*3/UL (ref 0–0.1)
BASOPHILS NFR BLD AUTO: 0.7 %
BILIRUB SERPL-MCNC: 1.1 MG/DL (ref 0–1.2)
BILIRUB UR STRIP.AUTO-MCNC: NEGATIVE MG/DL
BNP SERPL-MCNC: 32 PG/ML (ref 0–99)
BUN SERPL-MCNC: 13 MG/DL (ref 6–23)
CALCIUM SERPL-MCNC: 8.9 MG/DL (ref 8.6–10.3)
CARDIAC TROPONIN I PNL SERPL HS: <3 NG/L (ref 0–13)
CARDIAC TROPONIN I PNL SERPL HS: <3 NG/L (ref 0–13)
CHLORIDE SERPL-SCNC: 107 MMOL/L (ref 98–107)
CO2 SERPL-SCNC: 26 MMOL/L (ref 21–32)
COLOR UR: COLORLESS
CREAT SERPL-MCNC: 0.78 MG/DL (ref 0.5–1.05)
EGFRCR SERPLBLD CKD-EPI 2021: 83 ML/MIN/1.73M*2
EOSINOPHIL # BLD AUTO: 0.15 X10*3/UL (ref 0–0.7)
EOSINOPHIL NFR BLD AUTO: 2.5 %
ERYTHROCYTE [DISTWIDTH] IN BLOOD BY AUTOMATED COUNT: 12.5 % (ref 11.5–14.5)
GLUCOSE SERPL-MCNC: 78 MG/DL (ref 74–99)
GLUCOSE UR STRIP.AUTO-MCNC: NORMAL MG/DL
HCT VFR BLD AUTO: 38.8 % (ref 36–46)
HGB BLD-MCNC: 13 G/DL (ref 12–16)
HOLD SPECIMEN: NORMAL
IMM GRANULOCYTES # BLD AUTO: 0.02 X10*3/UL (ref 0–0.7)
IMM GRANULOCYTES NFR BLD AUTO: 0.3 % (ref 0–0.9)
INR PPP: 1.1 (ref 0.9–1.1)
KETONES UR STRIP.AUTO-MCNC: NEGATIVE MG/DL
LACTATE SERPL-SCNC: 0.6 MMOL/L (ref 0.4–2)
LEUKOCYTE ESTERASE UR QL STRIP.AUTO: NEGATIVE
LYMPHOCYTES # BLD AUTO: 1.48 X10*3/UL (ref 1.2–4.8)
LYMPHOCYTES NFR BLD AUTO: 24.9 %
MAGNESIUM SERPL-MCNC: 1.87 MG/DL (ref 1.6–2.4)
MCH RBC QN AUTO: 31.8 PG (ref 26–34)
MCHC RBC AUTO-ENTMCNC: 33.5 G/DL (ref 32–36)
MCV RBC AUTO: 95 FL (ref 80–100)
MONOCYTES # BLD AUTO: 0.56 X10*3/UL (ref 0.1–1)
MONOCYTES NFR BLD AUTO: 9.4 %
NEUTROPHILS # BLD AUTO: 3.69 X10*3/UL (ref 1.2–7.7)
NEUTROPHILS NFR BLD AUTO: 62.2 %
NITRITE UR QL STRIP.AUTO: NEGATIVE
NRBC BLD-RTO: 0 /100 WBCS (ref 0–0)
PH UR STRIP.AUTO: 6 [PH]
PLATELET # BLD AUTO: 235 X10*3/UL (ref 150–450)
POTASSIUM SERPL-SCNC: 3.7 MMOL/L (ref 3.5–5.3)
PROT SERPL-MCNC: 6.4 G/DL (ref 6.4–8.2)
PROT UR STRIP.AUTO-MCNC: NEGATIVE MG/DL
PROTHROMBIN TIME: 12 SECONDS (ref 9.8–12.8)
RBC # BLD AUTO: 4.09 X10*6/UL (ref 4–5.2)
RBC # UR STRIP.AUTO: NEGATIVE /UL
SARS-COV-2 RNA RESP QL NAA+PROBE: NOT DETECTED
SODIUM SERPL-SCNC: 138 MMOL/L (ref 136–145)
SP GR UR STRIP.AUTO: 1
UROBILINOGEN UR STRIP.AUTO-MCNC: NORMAL MG/DL
WBC # BLD AUTO: 5.9 X10*3/UL (ref 4.4–11.3)

## 2024-10-01 PROCEDURE — 70450 CT HEAD/BRAIN W/O DYE: CPT

## 2024-10-01 PROCEDURE — 84484 ASSAY OF TROPONIN QUANT: CPT | Performed by: PHYSICIAN ASSISTANT

## 2024-10-01 PROCEDURE — 71045 X-RAY EXAM CHEST 1 VIEW: CPT

## 2024-10-01 PROCEDURE — 93005 ELECTROCARDIOGRAM TRACING: CPT | Mod: 59

## 2024-10-01 PROCEDURE — 83880 ASSAY OF NATRIURETIC PEPTIDE: CPT | Performed by: PHYSICIAN ASSISTANT

## 2024-10-01 PROCEDURE — 87635 SARS-COV-2 COVID-19 AMP PRB: CPT | Performed by: PHYSICIAN ASSISTANT

## 2024-10-01 PROCEDURE — 99285 EMERGENCY DEPT VISIT HI MDM: CPT | Mod: CS,25

## 2024-10-01 PROCEDURE — 80053 COMPREHEN METABOLIC PANEL: CPT | Performed by: PHYSICIAN ASSISTANT

## 2024-10-01 PROCEDURE — 85610 PROTHROMBIN TIME: CPT | Performed by: PHYSICIAN ASSISTANT

## 2024-10-01 PROCEDURE — 70450 CT HEAD/BRAIN W/O DYE: CPT | Performed by: RADIOLOGY

## 2024-10-01 PROCEDURE — 96360 HYDRATION IV INFUSION INIT: CPT | Mod: 59

## 2024-10-01 PROCEDURE — 96361 HYDRATE IV INFUSION ADD-ON: CPT

## 2024-10-01 PROCEDURE — 96374 THER/PROPH/DIAG INJ IV PUSH: CPT | Mod: 59

## 2024-10-01 PROCEDURE — 83735 ASSAY OF MAGNESIUM: CPT | Performed by: PHYSICIAN ASSISTANT

## 2024-10-01 PROCEDURE — 71045 X-RAY EXAM CHEST 1 VIEW: CPT | Performed by: RADIOLOGY

## 2024-10-01 PROCEDURE — 83605 ASSAY OF LACTIC ACID: CPT | Performed by: PHYSICIAN ASSISTANT

## 2024-10-01 PROCEDURE — 85025 COMPLETE CBC W/AUTO DIFF WBC: CPT | Performed by: PHYSICIAN ASSISTANT

## 2024-10-01 PROCEDURE — 81003 URINALYSIS AUTO W/O SCOPE: CPT | Performed by: PHYSICIAN ASSISTANT

## 2024-10-01 PROCEDURE — 93005 ELECTROCARDIOGRAM TRACING: CPT

## 2024-10-01 PROCEDURE — 2500000004 HC RX 250 GENERAL PHARMACY W/ HCPCS (ALT 636 FOR OP/ED): Performed by: PHYSICIAN ASSISTANT

## 2024-10-01 PROCEDURE — 96375 TX/PRO/DX INJ NEW DRUG ADDON: CPT

## 2024-10-01 PROCEDURE — 36415 COLL VENOUS BLD VENIPUNCTURE: CPT | Performed by: PHYSICIAN ASSISTANT

## 2024-10-01 RX ORDER — DIPHENHYDRAMINE HYDROCHLORIDE 50 MG/ML
25 INJECTION INTRAMUSCULAR; INTRAVENOUS ONCE
Status: COMPLETED | OUTPATIENT
Start: 2024-10-01 | End: 2024-10-01

## 2024-10-01 RX ORDER — KETOROLAC TROMETHAMINE 30 MG/ML
15 INJECTION, SOLUTION INTRAMUSCULAR; INTRAVENOUS ONCE
Status: COMPLETED | OUTPATIENT
Start: 2024-10-01 | End: 2024-10-01

## 2024-10-01 RX ORDER — METOCLOPRAMIDE HYDROCHLORIDE 5 MG/ML
10 INJECTION INTRAMUSCULAR; INTRAVENOUS ONCE
Status: COMPLETED | OUTPATIENT
Start: 2024-10-01 | End: 2024-10-01

## 2024-10-01 ASSESSMENT — HEART SCORE
AGE: 65+
ECG: NORMAL
HISTORY: SLIGHTLY SUSPICIOUS
RISK FACTORS: 1-2 RISK FACTORS
TROPONIN: LESS THAN OR EQUAL TO NORMAL LIMIT
HEART SCORE: 3

## 2024-10-01 ASSESSMENT — PAIN DESCRIPTION - LOCATION
LOCATION: HEAD
LOCATION_2: CHEST

## 2024-10-01 ASSESSMENT — PAIN DESCRIPTION - PAIN TYPE: TYPE: ACUTE PAIN

## 2024-10-01 ASSESSMENT — PAIN SCALES - GENERAL
PAINLEVEL_OUTOF10: 4
PAINLEVEL_OUTOF10: 3
PAINLEVEL_OUTOF10: 10 - WORST POSSIBLE PAIN
PAINLEVEL_OUTOF10: 4
PAINLEVEL_OUTOF10: 4

## 2024-10-01 ASSESSMENT — PAIN DESCRIPTION - ORIENTATION
ORIENTATION: POSTERIOR
ORIENTATION_2: MID

## 2024-10-01 ASSESSMENT — LIFESTYLE VARIABLES
HAVE YOU EVER FELT YOU SHOULD CUT DOWN ON YOUR DRINKING: NO
EVER HAD A DRINK FIRST THING IN THE MORNING TO STEADY YOUR NERVES TO GET RID OF A HANGOVER: NO
HAVE PEOPLE ANNOYED YOU BY CRITICIZING YOUR DRINKING: NO
EVER FELT BAD OR GUILTY ABOUT YOUR DRINKING: NO
TOTAL SCORE: 0

## 2024-10-01 ASSESSMENT — PAIN - FUNCTIONAL ASSESSMENT
PAIN_FUNCTIONAL_ASSESSMENT: 0-10
PAIN_FUNCTIONAL_ASSESSMENT: 0-10

## 2024-10-01 ASSESSMENT — PAIN DESCRIPTION - DESCRIPTORS: DESCRIPTORS_2: DISCOMFORT

## 2024-10-01 NOTE — ED PROVIDER NOTES
HPI   Chief Complaint   Patient presents with    Chest Pain     Mid sternal chest discomfort intermittently x 3days    Headache     Intermittently x 6wks       A 69-year-old female patient comes in the emergency department today with complaints of a headache x 6 weeks.  States is pretty persistent.  States it starts in the occipital region wraps around her head.  She rates that pain a 9 out of 10 on the pain scale currently described as a pressure with associated sharpness.  States she is also had some intermittent chest tightness over the last 3 days with associated cough.  Denies any fevers or chills.   does have history of hypertension has noticed at times that her blood pressure is elevated.  For this purpose comes in the emergency department today for further evaluation.  Otherwise no other complaints this present time.              Patient History   Past Medical History:   Diagnosis Date    Allergic     Allergy status to unspecified drugs, medicaments and biological substances     History of seasonal allergies    Anxiety     Arthritis     Cataract     Depression     GERD (gastroesophageal reflux disease)     Headache     Hypertension     Peptic ulceration     Personal history of other diseases of the circulatory system 06/28/2021    History of hypertension    Personal history of other diseases of the digestive system     History of gastroesophageal reflux (GERD)    Personal history of other diseases of the musculoskeletal system and connective tissue 06/28/2021    History of arthritis    Personal history of other diseases of the respiratory system     History of sinusitis    Personal history of other infectious and parasitic diseases     History of Helicobacter pylori infection    Scoliosis     Unspecified visual loss 06/28/2021    Vision problems    Urinary tract infection      Past Surgical History:   Procedure Laterality Date    BACK SURGERY      OTHER SURGICAL HISTORY  06/28/2021    Tonsillectomy     OTHER SURGICAL HISTORY  06/28/2021    Back surgery    OTHER SURGICAL HISTORY  05/28/2019    Esophagogastroduodenoscopy    OTHER SURGICAL HISTORY  05/28/2019    Complete colonoscopy    SINUS SURGERY      TUBAL LIGATION       Family History   Problem Relation Name Age of Onset    Hypertension Other      Arthritis Mother No name     Blood clot Mother No name     Clotting disorder Mother No name     Vision loss Mother No name     Hypertension Father No name     Alcohol abuse Father's Brother No name     Hearing loss Brother No name      Social History     Tobacco Use    Smoking status: Former     Current packs/day: 2.00     Average packs/day: 2.0 packs/day for 15.0 years (30.0 ttl pk-yrs)     Types: Cigarettes     Passive exposure: Never    Smokeless tobacco: Never   Substance Use Topics    Alcohol use: Yes     Comment: Occasionally    Drug use: Never       Physical Exam   ED Triage Vitals [10/01/24 0752]   Temperature Heart Rate Respirations BP   35.9 °C (96.6 °F) 65 18 169/78      Pulse Ox Temp Source Heart Rate Source Patient Position   95 % Temporal Monitor --      BP Location FiO2 (%)     Right arm --       Physical Exam  Constitutional:       General: She is in acute distress.      Appearance: Normal appearance. She is well-developed. She is not ill-appearing or diaphoretic.   HENT:      Head: Normocephalic and atraumatic.      Nose: Nose normal.   Eyes:      Extraocular Movements: Extraocular movements intact.      Conjunctiva/sclera: Conjunctivae normal.      Pupils: Pupils are equal, round, and reactive to light.   Cardiovascular:      Rate and Rhythm: Normal rate and regular rhythm.   Pulmonary:      Effort: Pulmonary effort is normal. No respiratory distress.      Breath sounds: Normal breath sounds. No stridor. No wheezing.   Chest:      Chest wall: No tenderness or crepitus.   Musculoskeletal:         General: Normal range of motion.      Cervical back: Normal range of motion.   Skin:     General: Skin is  warm and dry.   Neurological:      General: No focal deficit present.      Mental Status: She is alert and oriented to person, place, and time. Mental status is at baseline.      Comments: NIH: 0   Psychiatric:         Mood and Affect: Mood normal.           ED Course & MDM   Diagnoses as of 10/01/24 1108   Nonintractable headache, unspecified chronicity pattern, unspecified headache type   Sinus bradycardia                 No data recorded     [place] Coma Scale Score: 15 (10/01/24 0759 : Melanie Ray RN) HEART Score: 3 (10/01/24 1106 : Lance Cummings PA-C)                         Medical Decision Making  A 69-year-old female patient comes in the emergency department today with complaints of a headache x 6 weeks.  States is pretty persistent.  States it starts in the occipital region wraps around her head.  She rates that pain a 9 out of 10 on the pain scale currently described as a pressure with associated sharpness.  States she is also had some intermittent chest tightness over the last 3 days with associated cough.  Denies any fevers or chills.  States does have history of hypertension has noticed at times that her blood pressure is elevated.  For this purpose comes in the emergency department today for further evaluation.  Otherwise no other complaints this present time.    EKG, chest x-ray, laboratory studies ordered to rule out ACS, arrhythmias electrolyte abnormalities, leukocytosis, left shift, acute kidney injury, pneumonia, pneumothorax, pulmonary congestion.  CT study of the head ordered to rule out brain mass, brain edema, brain hemorrhage.  IV fluids IV Reglan IV Benadryl IV Decadron ordered for patient's headache.    EKG: My EKG interpretation, 55 bpm, sinus bradycardia, no ST elevations, T wave abnormalities, no STEMI    Patient has negative delta troponins negative COVID-19 negative BMP negative metabolic panel magnesium normal lactate negative, urinalysis negative, no leukocytosis or left  shift CT study of the head is negative as well as negative chest x-ray.    Patient's headache is significantly improved.  IV Toradol ordered just to offer more significant relief for the patient.  Will give patient close follow-up with cardiology as well as neurology for her headaches as well as her sinus bradycardia.  Patient agrees with this plan expressed verbal understanding.  All questions were answered.    Started the patient    Diagnosis: Headache, sinus bradycardia      Labs Reviewed   COMPREHENSIVE METABOLIC PANEL - Abnormal       Result Value    Glucose 78      Sodium 138      Potassium 3.7      Chloride 107      Bicarbonate 26      Anion Gap 9 (*)     Urea Nitrogen 13      Creatinine 0.78      eGFR 83      Calcium 8.9      Albumin 4.1      Alkaline Phosphatase 56      Total Protein 6.4      AST 12      Bilirubin, Total 1.1      ALT 8     URINALYSIS WITH REFLEX CULTURE AND MICROSCOPIC - Abnormal    Color, Urine Colorless (*)     Appearance, Urine Clear      Specific Gravity, Urine 1.002 (*)     pH, Urine 6.0      Protein, Urine NEGATIVE      Glucose, Urine Normal      Blood, Urine NEGATIVE      Ketones, Urine NEGATIVE      Bilirubin, Urine NEGATIVE      Urobilinogen, Urine Normal      Nitrite, Urine NEGATIVE      Leukocyte Esterase, Urine NEGATIVE     MAGNESIUM - Normal    Magnesium 1.87     LACTATE - Normal    Lactate 0.6      Narrative:     Venipuncture immediately after or during the administration of Metamizole may lead to falsely low results. Testing should be performed immediately prior to Metamizole dosing.   PROTIME-INR - Normal    Protime 12.0      INR 1.1     B-TYPE NATRIURETIC PEPTIDE - Normal    BNP 32      Narrative:        <100 pg/mL - Heart failure unlikely  100-299 pg/mL - Intermediate probability of acute heart                  failure exacerbation. Correlate with clinical                  context and patient history.    >=300 pg/mL - Heart Failure likely. Correlate with clinical                   context and patient history.    BNP testing is performed using different testing methodology at [facility]] than at other [facility] hospitals. Direct result comparisons should only be made within the same method.      SARS-COV-2 PCR - Normal    Coronavirus 2019, PCR Not Detected      Narrative:     This assay has received FDA Emergency Use Authorization (EUA) and is only authorized for the duration of time that circumstances exist to justify the authorization of the emergency use of in vitro diagnostic tests for the detection of SARS-CoV-2 virus and/or diagnosis of COVID-19 infection under section 564(b)(1) of the Act, 21 U.S.C. 360bbb-3(b)(1). This assay is an in vitro diagnostic nucleic acid amplification test for the qualitative detection of SARS-CoV-2 from nasopharyngeal specimens and has been validated for use at [facility]. Negative results do not preclude COVID-19 infections and should not be used as the sole basis for diagnosis, treatment, or other management decisions.     SERIAL TROPONIN-INITIAL - Normal    Troponin I, High Sensitivity <3      Narrative:     Less than 99th percentile of normal range cutoff-  Female and children under 18 years old <14 ng/L; Male <21 ng/L: Negative  Repeat testing should be performed if clinically indicated.     Female and children under 18 years old 14-50 ng/L; Male 21-50 ng/L:  Consistent with possible cardiac damage and possible increased clinical   risk. Serial measurements may help to assess extent of myocardial damage.     >50 ng/L: Consistent with cardiac damage, increased clinical risk and  myocardial infarction. Serial measurements may help assess extent of   myocardial damage.      NOTE: Children less than 1 year old may have higher baseline troponin   levels and results should be interpreted in conjunction with the overall   clinical context.     NOTE: Troponin I testing is performed using a different   testing methodology  at [facility] than at other   [facility]. Direct result comparisons should only   be made within the same method.   SERIAL TROPONIN, 1 HOUR - Normal    Troponin I, High Sensitivity <3      Narrative:     Less than 99th percentile of normal range cutoff-  Female and children under 18 years old <14 ng/L; Male <21 ng/L: Negative  Repeat testing should be performed if clinically indicated.     Female and children under 18 years old 14-50 ng/L; Male 21-50 ng/L:  Consistent with possible cardiac damage and possible increased clinical   risk. Serial measurements may help to assess extent of myocardial damage.     >50 ng/L: Consistent with cardiac damage, increased clinical risk and  myocardial infarction. Serial measurements may help assess extent of   myocardial damage.      NOTE: Children less than 1 year old may have higher baseline troponin   levels and results should be interpreted in conjunction with the overall   clinical context.     NOTE: Troponin I testing is performed using a different   testing methodology at [facility] than at [facility]. Direct result comparisons should only   be made within the same method.   TROPONIN SERIES- (INITIAL, 1 HR)    Narrative:     The following orders were created for panel order Troponin I Series, High Sensitivity (0, 1 HR).  Procedure                               Abnormality         Status                     ---------                               -----------         ------                     Troponin I, High Sensiti...[[id number]]  Normal              Final result               Troponin, High Sensitivi...[[id number]]  Normal              Final result                 Please view results for these tests on the individual orders.   CBC WITH AUTO DIFFERENTIAL    WBC 5.9      nRBC 0.0      RBC 4.09      Hemoglobin 13.0      Hematocrit 38.8      MCV 95      MCH 31.8      MCHC 33.5      RDW 12.5      Platelets 235      Neutrophils %  62.2      Immature Granulocytes %, Automated 0.3      Lymphocytes % 24.9      Monocytes % 9.4      Eosinophils % 2.5      Basophils % 0.7      Neutrophils Absolute 3.69      Immature Granulocytes Absolute, Automated 0.02      Lymphocytes Absolute 1.48      Monocytes Absolute 0.56      Eosinophils Absolute 0.15      Basophils Absolute 0.04     URINALYSIS WITH REFLEX CULTURE AND MICROSCOPIC    Narrative:     The following orders were created for panel order Urinalysis with Reflex Culture and Microscopic.  Procedure                               Abnormality         Status                     ---------                               -----------         ------                     Urinalysis with Reflex C...[[id number]]  Abnormal            Final result               Extra Urine Gray Tube[[id number]]                            In process                   Please view results for these tests on the individual orders.   EXTRA URINE GRAY TUBE        CT head wo IV contrast   Final Result   Unremarkable exam.        Signed by: Cole Dennis 10/1/2024 9:18 AM   Dictation workstation:   [id number]      XR chest 1 view   Final Result   1.  No active cardiopulmonary disease.  There has not been   significant interval change from the prior exam.        Signed by: Cole Dennis 10/1/2024 9:17 AM   Dictation workstation:   [id number]            Procedure  ECG 12 lead    Performed by: Lance Cummings PA-C  Authorized by: Lance Cummings PA-C    Interpretation:     Details:  My EKG interpretation  Rate:     ECG rate:  58    ECG rate assessment: bradycardic    Rhythm:     Rhythm: sinus bradycardia    ST segments:     ST segments:  Normal  T waves:     T waves: normal         Lance Cummings PA-C  10/01/24 1108

## 2024-10-02 LAB
ATRIAL RATE: 55 BPM
ATRIAL RATE: 58 BPM
P AXIS: 50 DEGREES
P AXIS: 56 DEGREES
P OFFSET: 183 MS
P OFFSET: 186 MS
P ONSET: 127 MS
P ONSET: 128 MS
PR INTERVAL: 186 MS
PR INTERVAL: 188 MS
Q ONSET: 221 MS
Q ONSET: 221 MS
QRS COUNT: 8 BEATS
QRS COUNT: 9 BEATS
QRS DURATION: 80 MS
QRS DURATION: 80 MS
QT INTERVAL: 408 MS
QT INTERVAL: 440 MS
QTC CALCULATION(BAZETT): 400 MS
QTC CALCULATION(BAZETT): 420 MS
QTC FREDERICIA: 403 MS
QTC FREDERICIA: 427 MS
R AXIS: 1 DEGREES
R AXIS: 14 DEGREES
T AXIS: 23 DEGREES
T AXIS: 30 DEGREES
T OFFSET: 425 MS
T OFFSET: 441 MS
VENTRICULAR RATE: 55 BPM
VENTRICULAR RATE: 58 BPM

## 2024-10-14 ENCOUNTER — EVALUATION (OUTPATIENT)
Dept: PHYSICAL THERAPY | Facility: CLINIC | Age: 69
End: 2024-10-14
Payer: MEDICARE

## 2024-10-14 DIAGNOSIS — M25.511 ACUTE PAIN OF RIGHT SHOULDER: ICD-10-CM

## 2024-10-14 DIAGNOSIS — M25.819 SHOULDER IMPINGEMENT: Primary | ICD-10-CM

## 2024-10-14 PROCEDURE — 97110 THERAPEUTIC EXERCISES: CPT | Mod: GP | Performed by: PHYSICAL THERAPIST

## 2024-10-14 PROCEDURE — 97161 PT EVAL LOW COMPLEX 20 MIN: CPT | Mod: GP | Performed by: PHYSICAL THERAPIST

## 2024-10-14 NOTE — PROGRESS NOTES
Physical Therapy Evaluation and Treatment      Patient Name: Aaron Gilbert  MRN: [id number]  Today's Date: 10/14/2024  Time Calculation  Start Time: 0350  Stop Time: 0430  Time Calculation (min): 40 min      PT Evaluation Time Entry  PT Evaluation (Low) Time Entry: 20  PT Therapeutic Procedures Time Entry  Therapeutic Exercise Time Entry: 20                   INSURANCE:  Visit number: 1/8  MEDICARE/ MUTUAL OF [place] 2230($0 USED ) COPAY 0 ,XLO333(MET) COVERAGE 80/100 OOP 0 MOM TO FOLLOW MEDICARE NO AUTH REQ [id number] // Paz confirmed 10/7/24 4:21pm     Referring Provider: Joey Cobb MD  Hx: HTN, OA, HAs, Ulcers    ASSESSMENT:  PT Assessment Results: decreased knowledge of HEP, activity limitations, ADLs/IADLs/self care skills, flexibility, motor function/control/tone, pain, participation restrictions, range of motion/joint mobility, strength, posture, transfers, coordination, decreased knowledge of precautions.  Rehab Prognosis: Good  Evaluation/Treatment Tolerance: Patient tolerated treatment well  Stable and/or uncomplicated characteristics    Aaron Gilbert is a 69 y.o. female presenting to the clinic with R shoulder pain consistent with RTC impingement. Pt demonstrates decreased ROM and strength of the R shoulder causing pain and dysfunction with lifting and reaching. Pt was given and reviewed HEP including postural exercises. Pt will benefit from skilled PT in order to increase ROM and strength of the R shoulder so that the pt can perform ADLs without pain and return to PLOF.     PLAN:  Treatments/Interventions: traction, dry needling, edema control, education/instruction, home program, self care/home management, manual therapy, neuromuscular re-education, therapeutic activities, therapeutic exercises, modalities, therapeutic elastic taping.   PT Plan: Skilled PT  PT Frequency: 2 times per week  Duration: 8 visits  Onset Date: 01/01/24  Certification Period Start Date: 10/14/24  Certification Period  End Date: 01/12/25  Rehab Potential: Good  Plan of Care Agreement: Patient    Goals -    By discharge pt will achieve the following goals:   Pt will report less than a 2/10 pain at the worst.  Pt will report a significant improvement with Quick DASH score.  Pt will have at least 4+/5 strength for the right shoulder.  Pt will have AROM to WFL for the right shoulder.  Pt will be independent with HEP.    CURRENT PROBLEM:   1. Shoulder impingement  Follow Up In Physical Therapy      2. Acute pain of right shoulder  Follow Up In Physical Therapy          Subjective    General -    Pt reports that she has had R shoulder pain since the beginning of the year. Pt reports that she did get a cortisone shot by Dr. Joey Cobb, but it did not help much.     Mechanism of Injury -    Insidious Onset    History of Current Episode - 1/1/24    Pain -    Pain Location: R shoulder and neck  Pain Best: 6/10  Pain Today: 9/10  Pain Worst: 10/10   Pain Type: Constant, Achy/Dull, Sharp, Stiffness/Tightness  Pain Exacerbating Factors: lifting, reaching.  Pain Relieving Factors: Rest, Heat  Difficulty Sleeping: Yes    Work -   Work Status: Retired    Home Living -    Pt lives with her .    Patient Stated Goals -    Be pain free    PRECAUTIONS -    None    Prior Level of Function -    I with ADLs/IADLs     Objective     Shoulder AROM -  R shoulder Flexion: 130  R shoulder Abduction: 126   R shoulder ER: 58   R shoulder Functional IR reach: T10     Shoulder MMT (/5) -  R shoulder Flexion: 4   R shoulder Abduction: 4   R shoulder ER: 4   R shoulder IR: 4   L shoulder Flexion: 4+  L shoulder Abduction: 4+  L shoulder ER: 4   L shoulder IR: 4+     Palpation -  TTP R biceps tendon, R UT/Levator    Special Tests -   Neer Impingement positive right  Hawkin's Saul negative right  Remedios negative right  Empty Can positive right  Speed's negative right    Posture -     Elevated Scapula R  Rounded Shoulders  Forward Head    Outcome Measures  -   Other Measures  Disability of Arm Shoulder Hand (DASH): 41 (% Quick DASH)     Treatment -   Evaluation -   Low (79685)  Therapeutic Exercise (15760) -     Seated Correct Posture   Scapular Retractions   Cervical Retractions  UT/Levator Scapulae Stretches    Doorway Pec Stretch  T-band B ER: GTB x15  T-band Row/Ext: GTB x15     OP Patient Education -   Access Code: [email or web address]  URL: [email or web address]  Date: 10/14/2024  Prepared by: Cece Tenorio    Exercises  - Seated Correct Posture   - Seated Scapular Retraction  - 1-2 x daily - 2 sets - 10 reps - 2 seconds hold  - Seated Cervical Retraction  - 1-2 x daily - 2 sets - 10 reps - 2 seconds hold  - Seated Upper Trapezius Stretch  - 1-2 x daily - 3 sets - 30 seconds hold  - Gentle Levator Scapulae Stretch  - 1-2 x daily - 3 sets - 30 seconds hold  - Doorway Pec Stretch at 60 Degrees Abduction with Arm Straight  - 1-2 x daily - 3 sets - 30 sec hold  - Shoulder External Rotation and Scapular Retraction with Resistance  - 1-2 x daily - 2 sets - 10 reps  - Standing Row with Anchored Resistance  - 1-2 x daily - 2 sets - 10 reps  - Shoulder extension with resistance - Neutral  - 1-2 x daily - 2 sets - 10 reps

## 2024-10-21 ENCOUNTER — TELEPHONE (OUTPATIENT)
Dept: ORTHOPEDIC SURGERY | Facility: CLINIC | Age: 69
End: 2024-10-21
Payer: MEDICARE

## 2024-10-21 DIAGNOSIS — M75.101 TEAR OF RIGHT ROTATOR CUFF, UNSPECIFIED TEAR EXTENT, UNSPECIFIED WHETHER TRAUMATIC: ICD-10-CM

## 2024-10-23 ENCOUNTER — TREATMENT (OUTPATIENT)
Dept: PHYSICAL THERAPY | Facility: CLINIC | Age: 69
End: 2024-10-23
Payer: MEDICARE

## 2024-10-23 DIAGNOSIS — M25.511 ACUTE PAIN OF RIGHT SHOULDER: ICD-10-CM

## 2024-10-23 DIAGNOSIS — M25.819 SHOULDER IMPINGEMENT: Primary | ICD-10-CM

## 2024-10-23 PROCEDURE — 97110 THERAPEUTIC EXERCISES: CPT | Mod: GP,CQ

## 2024-10-23 ASSESSMENT — PAIN SCALES - GENERAL: PAINLEVEL_OUTOF10: 4

## 2024-10-23 ASSESSMENT — PAIN DESCRIPTION - DESCRIPTORS: DESCRIPTORS: ACHING

## 2024-10-23 ASSESSMENT — PAIN - FUNCTIONAL ASSESSMENT: PAIN_FUNCTIONAL_ASSESSMENT: 0-10

## 2024-10-23 NOTE — PROGRESS NOTES
"Physical Therapy Treatment    Patient Name: Aaron Gilbert  MRN: [id number]  Today's Date: 10/23/2024  Time Calculation  Start Time: 1130  Stop Time: 1208  Time Calculation (min): 38 min     PT Therapeutic Procedures Time Entry  Therapeutic Exercise Time Entry: 38                 Insurance:   Visit number: 2/8  MEDICARE/ MUTUAL OF [place] 2230($0 USED ) COPAY 0 ,EMD108(MET) COVERAGE 80/100 OOP 0 MOM TO FOLLOW MEDICARE NO AUTH REQ [id number] // Paz confirmed 10/7/24 4:21pm      Referring Provider: Joey Cobb MD  Hx: HTN, OA, HAs, Ulcers  Assessment:   Progressed pt's program today w/ addition of UBE F/R as a warm up - emphasis on postural awareness and pacing. Also added Supine TB Bilat ER and Horiz Abd, SL 4 way. Some discomfort through post shoulder and upper trap (R) w/ given ex's however she was able to complete them. Cues to perform scap depression when she starts to get tightness and discomfort through R UT. Pt tends to hike R shoulder w/ exs. She will cont to benefit from skilled PT to address her deficits and improve her overall functional ability.   Plan:   Cont to progress pt's program as yanely w/ emphasis on improving R shoulder mobility and strength to return to PLOF.     Current Problem  1. Shoulder impingement  Follow Up In Physical Therapy      2. Acute pain of right shoulder  Follow Up In Physical Therapy          Subjective   General   Pt states her R shoulder \"is a little better than it was before.\" 4/10 reported coming in.   Precautions       Pain  Pain Assessment: 0-10  0-10 (Numeric) Pain Score: 4  Pain Location: Shoulder  Pain Orientation: Right, Outer  Pain Descriptors: Aching    Objective       Treatments:  Therapeutic Exercise (42299):  UBE (F/R) 2'/2'    Seated Correct Posture -Reviewed  Scapular Retractions 3\" x 15  Cervical Retractions 3\" x 10   UT/Levator Scapulae Stretches  30\" x 2 ea (R only)   Doorway Pec Stretch 20\" x 3   T-band B ER: GTB x15   T-band Row/Ext: GTB 2x10   Supine TB " BilatHABD YTB 2x10   SL Shldr 4 way x10 ea    EDUCATION:     Goals -    By discharge pt will achieve the following goals:   Pt will report less than a 2/10 pain at the worst.  Pt will report a significant improvement with Quick DASH score.  Pt will have at least 4+/5 strength for the right shoulder.  Pt will have AROM to WFL for the right shoulder.  Pt will be independent with HEP.

## 2024-10-24 ENCOUNTER — APPOINTMENT (OUTPATIENT)
Dept: CARDIOLOGY | Facility: CLINIC | Age: 69
End: 2024-10-24
Payer: MEDICARE

## 2024-10-25 ENCOUNTER — APPOINTMENT (OUTPATIENT)
Dept: NEUROLOGY | Facility: CLINIC | Age: 69
End: 2024-10-25
Payer: MEDICARE

## 2024-10-25 VITALS
BODY MASS INDEX: 28.42 KG/M2 | HEIGHT: 63 IN | WEIGHT: 160.4 LBS | DIASTOLIC BLOOD PRESSURE: 80 MMHG | TEMPERATURE: 97.1 F | SYSTOLIC BLOOD PRESSURE: 130 MMHG

## 2024-10-25 DIAGNOSIS — G43.009 MIGRAINE WITHOUT AURA AND WITHOUT STATUS MIGRAINOSUS, NOT INTRACTABLE: ICD-10-CM

## 2024-10-25 DIAGNOSIS — R51.9 NONINTRACTABLE HEADACHE, UNSPECIFIED CHRONICITY PATTERN, UNSPECIFIED HEADACHE TYPE: ICD-10-CM

## 2024-10-25 DIAGNOSIS — M54.2 CERVICALGIA: Primary | ICD-10-CM

## 2024-10-25 PROCEDURE — 3079F DIAST BP 80-89 MM HG: CPT | Performed by: STUDENT IN AN ORGANIZED HEALTH CARE EDUCATION/TRAINING PROGRAM

## 2024-10-25 PROCEDURE — 1123F ACP DISCUSS/DSCN MKR DOCD: CPT | Performed by: STUDENT IN AN ORGANIZED HEALTH CARE EDUCATION/TRAINING PROGRAM

## 2024-10-25 PROCEDURE — 3008F BODY MASS INDEX DOCD: CPT | Performed by: STUDENT IN AN ORGANIZED HEALTH CARE EDUCATION/TRAINING PROGRAM

## 2024-10-25 PROCEDURE — 99204 OFFICE O/P NEW MOD 45 MIN: CPT | Performed by: STUDENT IN AN ORGANIZED HEALTH CARE EDUCATION/TRAINING PROGRAM

## 2024-10-25 PROCEDURE — 3075F SYST BP GE 130 - 139MM HG: CPT | Performed by: STUDENT IN AN ORGANIZED HEALTH CARE EDUCATION/TRAINING PROGRAM

## 2024-10-25 PROCEDURE — 1159F MED LIST DOCD IN RCRD: CPT | Performed by: STUDENT IN AN ORGANIZED HEALTH CARE EDUCATION/TRAINING PROGRAM

## 2024-10-25 PROCEDURE — 1160F RVW MEDS BY RX/DR IN RCRD: CPT | Performed by: STUDENT IN AN ORGANIZED HEALTH CARE EDUCATION/TRAINING PROGRAM

## 2024-10-25 PROCEDURE — 1036F TOBACCO NON-USER: CPT | Performed by: STUDENT IN AN ORGANIZED HEALTH CARE EDUCATION/TRAINING PROGRAM

## 2024-10-25 RX ORDER — CELECOXIB 100 MG/1
100 CAPSULE ORAL 2 TIMES DAILY
COMMUNITY

## 2024-10-25 RX ORDER — CYCLOBENZAPRINE HCL 5 MG
5 TABLET ORAL DAILY PRN
Qty: 30 TABLET | Refills: 0 | Status: SHIPPED | OUTPATIENT
Start: 2024-10-25 | End: 2024-11-24

## 2024-10-25 ASSESSMENT — PATIENT HEALTH QUESTIONNAIRE - PHQ9
2. FEELING DOWN, DEPRESSED OR HOPELESS: NOT AT ALL
SUM OF ALL RESPONSES TO PHQ9 QUESTIONS 1 & 2: 0
1. LITTLE INTEREST OR PLEASURE IN DOING THINGS: NOT AT ALL

## 2024-10-25 ASSESSMENT — LIFESTYLE VARIABLES
AUDIT-C TOTAL SCORE: 1
SKIP TO QUESTIONS 9-10: 1
HOW MANY STANDARD DRINKS CONTAINING ALCOHOL DO YOU HAVE ON A TYPICAL DAY: 1 OR 2
HOW OFTEN DO YOU HAVE SIX OR MORE DRINKS ON ONE OCCASION: NEVER
HOW OFTEN DO YOU HAVE A DRINK CONTAINING ALCOHOL: MONTHLY OR LESS

## 2024-10-25 ASSESSMENT — VISUAL ACUITY: VA_NORMAL: 1

## 2024-10-25 NOTE — PROGRESS NOTES
[facility]   Referring: Lance Cummings PA-C  PCP: Landon Wells MD  Chief Complaint   Patient presents with    Headache       Subjective     Aaron Gilbert is a 69 y.o. year old female  with pertinent past medical history of osteoarthritis, year round allergies, hypertension, hyperlipidemia, presents to office with headaches.     Patient has had headaches since her mid 30's. She was admitted to ED on 10/1/24 for 6 week history of daily headaches. The ED gave her IV fluids and tramadol. CT scan came back normal. Since the ED visit, her headaches have happened twice a week. She states the headache pain starts at the neck bilaterally and moves to the head, forehead, and face. Occasionally, she has had photophobia and phonophobia associated with them. The headaches can sometimes be pulsatile and be associated with flashing lights. She has tried Excedrin, tylenol, and OTC sinus medications, and it has resulted in mild symptom relief. She denies nausea, vomiting, and dizziness. She does not have any extremity weakness.  Patient has osteoarthritis of shoulder, neck, and other joints.     Patient has family history of HTN and hyperlipidemia. Patient's mother has central tremor. She does not have family history of cancer or headaches.             Patient Active Problem List   Diagnosis    Abdominal bloating    Abdominal pain    Abdominal weakness    Achilles tendinitis of right lower extremity    Anxiety    Atrophy of vagina    Bursitis of hip    Degenerative arthritis of lumbar spine    Depression    Hypokalemia    Irritable bowel syndrome with predominant constipation    Left knee pain    IT band syndrome    Lower back pain    Postural instability of trunk    Recurrent UTI    Rotator cuff tear    Shoulder impingement    Spondylolisthesis    Trochanteric bursitis of left hip    Urinary urgency    Vaginal hemorrhage    Seasonal allergies    Sacroiliitis (CMS-HCC)    Primary osteoarthritis  involving multiple joints    Palpitations    Kidney stones    Hypertrophy of nasal turbinates    Gastroesophageal reflux disease without esophagitis    Appendicitis    Overweight with body mass index (BMI) of 29 to 29.9 in adult    Dysuria    Essential (primary) hypertension    History of Helicobacter pylori infection    History of hypertension    Personal history of COVID-19    Impacted cerumen of right ear    Vision disorder    Weakness    Acute pain of right shoulder     Past Medical History:   Diagnosis Date    Allergic     Allergy status to unspecified drugs, medicaments and biological substances     History of seasonal allergies    Anxiety     Arthritis     Cataract     Depression     GERD (gastroesophageal reflux disease)     Headache     Hypertension     Peptic ulceration     Personal history of other diseases of the circulatory system 06/28/2021    History of hypertension    Personal history of other diseases of the digestive system     History of gastroesophageal reflux (GERD)    Personal history of other diseases of the musculoskeletal system and connective tissue 06/28/2021    History of arthritis    Personal history of other diseases of the respiratory system     History of sinusitis    Personal history of other infectious and parasitic diseases     History of Helicobacter pylori infection    Scoliosis     Unspecified visual loss 06/28/2021    Vision problems    Urinary tract infection      Past Surgical History:   Procedure Laterality Date    BACK SURGERY      OTHER SURGICAL HISTORY  06/28/2021    Tonsillectomy    OTHER SURGICAL HISTORY  06/28/2021    Back surgery    OTHER SURGICAL HISTORY  05/28/2019    Esophagogastroduodenoscopy    OTHER SURGICAL HISTORY  05/28/2019    Complete colonoscopy    SINUS SURGERY      TUBAL LIGATION       Social History     Tobacco Use    Smoking status: Former     Current packs/day: 2.00     Average packs/day: 2.0 packs/day for 15.0 years (30.0 ttl pk-yrs)     Types:  Cigarettes     Passive exposure: Never    Smokeless tobacco: Never   Substance Use Topics    Alcohol use: Yes     Comment: Occasionally     family history includes Alcohol abuse in her father's brother; Arthritis in her mother; Blood clot in her mother; Clotting disorder in her mother; Hearing loss in her brother; Hypertension in her father and another family member; Vision loss in her mother.    Current Outpatient Medications:     busPIRone (Buspar) 15 mg tablet, TAKE 1 TABLET BY MOUTH THREE TIMES DAILY, Disp: 90 tablet, Rfl: 5    celecoxib (CeleBREX) 100 mg capsule, Take 1 capsule (100 mg) by mouth 2 times a day., Disp: , Rfl:     estradiol (Estrace) 0.01 % (0.1 mg/gram) vaginal cream, APPLY A PEA-SIZED AMOUNT DAILY, Disp: 43 g, Rfl: 11    FLUoxetine (PROzac) 20 mg capsule, Take 1 capsule (20 mg) by mouth once daily., Disp: 90 capsule, Rfl: 3    fluticasone (Flonase) 50 mcg/actuation nasal spray, Administer 2 sprays into each nostril once daily. Shake gently. Before first use, prime pump. After use, clean tip and replace cap., Disp: 16 g, Rfl: 0    levocetirizine (Xyzal) 5 mg tablet, Take by mouth., Disp: , Rfl:     montelukast (Singulair) 10 mg tablet, Take 1 tablet (10 mg) by mouth once daily., Disp: 90 tablet, Rfl: 3    pantoprazole (ProtoNix) 40 mg EC tablet, Take 1 tablet (40 mg) by mouth once daily in the morning. Take before meals., Disp: 30 tablet, Rfl: 5    sucralfate (Carafate) 1 gram tablet, Take 1 tablet (1 g) by mouth 3 times a day before meals., Disp: 90 tablet, Rfl: 6    valsartan (Diovan) 80 mg tablet, Take 1 tablet (80 mg) by mouth once daily., Disp: 30 tablet, Rfl: 3    pitavastatin magnesium 4 mg tablet, Take 1 tablet by mouth once daily at bedtime., Disp: 90 tablet, Rfl: 1    rosuvastatin (Crestor) 10 mg tablet, TAKE 1 TABLET BY MOUTH AT BEDTIME, Disp: 90 tablet, Rfl: 3  No Known Allergies    Objective   Neurological Exam  Mental Status  Alert. Oriented to person, place, time and situation.  Recent and remote memory are intact. Speech is normal. Language is fluent with no aphasia. Attention and concentration are normal.    Cranial Nerves  CN I: Sense of smell is normal.  CN II: Visual acuity is normal. Visual fields full to confrontation.  CN III, IV, VI: Extraocular movements intact bilaterally. Normal lids and orbits bilaterally. Pupils equal round and reactive to light bilaterally.  CN V: Facial sensation is normal.  CN VII: Full and symmetric facial movement.  CN VIII: Hearing is normal.  CN IX, X: Palate elevates symmetrically. Normal gag reflex.  CN XI: Shoulder shrug strength is normal.  CN XII: Tongue midline without atrophy or fasciculations.    Motor  Normal muscle bulk throughout. No fasciculations present. Normal muscle tone. No abnormal involuntary movements. Strength is 5/5 throughout all four extremities.    Sensory  Light touch is normal in upper and lower extremities.     Coordination    Finger-to-nose, rapid alternating movements and heel-to-shin normal bilaterally without dysmetria.    Physical Exam  Vitals reviewed.   Constitutional:       Appearance: Normal appearance.   Eyes:      General: Lids are normal.      Extraocular Movements: Extraocular movements intact.      Pupils: Pupils are equal, round, and reactive to light.   Neck:      Comments: Decreased movement on lateral flexion of neck bilaterally. Associated with pain.   Musculoskeletal:      Cervical back: Pain with movement present. Decreased range of motion.   Neurological:      Mental Status: She is alert.      Motor: Motor strength is normal.     Coordination: Coordination is intact.   Psychiatric:         Speech: Speech normal.         Assessment and Plan:   Aaron Gilbert is a 69 y.o. year old female  with pertinent past medical history of osteoarthritis, year round allergies, hypertension, and hyperlipidemia presents to office with headaches. Patient has had headaches since her mid 30's but has been worse as of late.  Headaches involve bilateral neck, temples, and forehead. They can be pulsatile, and are associated with photophobia and phonophobia. Patient has history of arthritis. On PE, she has limited neck range of motion. There is pain on lateral flexion of the neck. Most likely diagnosis is migraines. Patient has pulsatile headaches with photophobia and phonophobia, which is characteristic of migraines. These are likely triggered by neck pain and arthritis.     Plan    #Neck pain and Migraines  - Conservative management by treating underlying cause of migraines, which is likely neck pain.  - Prescribed muscle relaxant: cyclobenzaprine 5 mg daily.   - Will encourage patient to seek out physical therapy for neck pain.   - If patient has worsening symptoms, she was instructed to call Dr. Willard office.    Follow Up in 6 months.      There are no Patient Instructions on file for this visit.

## 2024-10-28 ENCOUNTER — PATIENT MESSAGE (OUTPATIENT)
Dept: PRIMARY CARE | Facility: CLINIC | Age: 69
End: 2024-10-28

## 2024-10-28 ENCOUNTER — APPOINTMENT (OUTPATIENT)
Dept: CARDIOLOGY | Facility: CLINIC | Age: 69
End: 2024-10-28
Payer: MEDICARE

## 2024-10-28 VITALS
BODY MASS INDEX: 27.82 KG/M2 | SYSTOLIC BLOOD PRESSURE: 156 MMHG | WEIGHT: 157 LBS | DIASTOLIC BLOOD PRESSURE: 92 MMHG | HEIGHT: 63 IN | HEART RATE: 77 BPM

## 2024-10-28 DIAGNOSIS — R00.1 SINUS BRADYCARDIA: ICD-10-CM

## 2024-10-28 DIAGNOSIS — Z76.89 ENCOUNTER TO ESTABLISH CARE WITH NEW DOCTOR: ICD-10-CM

## 2024-10-28 DIAGNOSIS — R00.1 BRADYCARDIA: ICD-10-CM

## 2024-10-28 DIAGNOSIS — Z86.79 HISTORY OF HYPERTENSION: Primary | ICD-10-CM

## 2024-10-28 DIAGNOSIS — I10 ESSENTIAL (PRIMARY) HYPERTENSION: ICD-10-CM

## 2024-10-28 DIAGNOSIS — R00.2 PALPITATIONS: ICD-10-CM

## 2024-10-28 PROCEDURE — 3008F BODY MASS INDEX DOCD: CPT | Performed by: INTERNAL MEDICINE

## 2024-10-28 PROCEDURE — 3077F SYST BP >= 140 MM HG: CPT | Performed by: INTERNAL MEDICINE

## 2024-10-28 PROCEDURE — 3080F DIAST BP >= 90 MM HG: CPT | Performed by: INTERNAL MEDICINE

## 2024-10-28 PROCEDURE — 99204 OFFICE O/P NEW MOD 45 MIN: CPT | Performed by: INTERNAL MEDICINE

## 2024-10-28 PROCEDURE — 93000 ELECTROCARDIOGRAM COMPLETE: CPT | Performed by: INTERNAL MEDICINE

## 2024-10-28 PROCEDURE — 1159F MED LIST DOCD IN RCRD: CPT | Performed by: INTERNAL MEDICINE

## 2024-10-28 PROCEDURE — 1123F ACP DISCUSS/DSCN MKR DOCD: CPT | Performed by: INTERNAL MEDICINE

## 2024-10-28 PROCEDURE — 1036F TOBACCO NON-USER: CPT | Performed by: INTERNAL MEDICINE

## 2024-10-28 RX ORDER — FLUOXETINE 10 MG/1
10 CAPSULE ORAL DAILY
COMMUNITY

## 2024-10-30 ENCOUNTER — CLINICAL SUPPORT (OUTPATIENT)
Dept: PRIMARY CARE | Facility: CLINIC | Age: 69
End: 2024-10-30
Payer: MEDICARE

## 2024-10-30 ENCOUNTER — TREATMENT (OUTPATIENT)
Dept: PHYSICAL THERAPY | Facility: CLINIC | Age: 69
End: 2024-10-30
Payer: MEDICARE

## 2024-10-30 ENCOUNTER — EVALUATION (OUTPATIENT)
Dept: OCCUPATIONAL THERAPY | Facility: CLINIC | Age: 69
End: 2024-10-30
Payer: MEDICARE

## 2024-10-30 VITALS — SYSTOLIC BLOOD PRESSURE: 144 MMHG | DIASTOLIC BLOOD PRESSURE: 82 MMHG

## 2024-10-30 DIAGNOSIS — M25.819 SHOULDER IMPINGEMENT: Primary | ICD-10-CM

## 2024-10-30 DIAGNOSIS — I15.9 SECONDARY HYPERTENSION: ICD-10-CM

## 2024-10-30 DIAGNOSIS — M25.511 ACUTE PAIN OF RIGHT SHOULDER: ICD-10-CM

## 2024-10-30 DIAGNOSIS — M77.8 EXTENSOR CARPI ULNARIS TENDINITIS: Primary | ICD-10-CM

## 2024-10-30 PROCEDURE — 97140 MANUAL THERAPY 1/> REGIONS: CPT | Mod: GP,CQ

## 2024-10-30 PROCEDURE — 97112 NEUROMUSCULAR REEDUCATION: CPT | Mod: GP,CQ

## 2024-10-30 PROCEDURE — 99211 OFF/OP EST MAY X REQ PHY/QHP: CPT | Performed by: FAMILY MEDICINE

## 2024-10-30 PROCEDURE — 97165 OT EVAL LOW COMPLEX 30 MIN: CPT | Mod: GO | Performed by: OCCUPATIONAL THERAPIST

## 2024-10-30 ASSESSMENT — PAIN - FUNCTIONAL ASSESSMENT: PAIN_FUNCTIONAL_ASSESSMENT: 0-10

## 2024-10-30 ASSESSMENT — PATIENT HEALTH QUESTIONNAIRE - PHQ9
1. LITTLE INTEREST OR PLEASURE IN DOING THINGS: NOT AT ALL
2. FEELING DOWN, DEPRESSED OR HOPELESS: NOT AT ALL
SUM OF ALL RESPONSES TO PHQ9 QUESTIONS 1 AND 2: 0

## 2024-11-04 RX ORDER — VALSARTAN 160 MG/1
160 TABLET ORAL DAILY
Qty: 100 TABLET | Refills: 3 | Status: SHIPPED | OUTPATIENT
Start: 2024-11-04 | End: 2025-12-09

## 2024-11-08 ENCOUNTER — TREATMENT (OUTPATIENT)
Dept: PHYSICAL THERAPY | Facility: CLINIC | Age: 69
End: 2024-11-08
Payer: MEDICARE

## 2024-11-08 DIAGNOSIS — M25.819 SHOULDER IMPINGEMENT: ICD-10-CM

## 2024-11-08 DIAGNOSIS — M25.511 ACUTE PAIN OF RIGHT SHOULDER: ICD-10-CM

## 2024-11-08 PROCEDURE — 97110 THERAPEUTIC EXERCISES: CPT | Mod: GP | Performed by: PHYSICAL THERAPIST

## 2024-11-08 PROCEDURE — 97140 MANUAL THERAPY 1/> REGIONS: CPT | Mod: GP | Performed by: PHYSICAL THERAPIST

## 2024-11-08 NOTE — PROGRESS NOTES
"Physical Therapy Treatment    Patient Name: Aaron Gilbert  MRN: [id number]  Today's Date: 11/8/2024  Time Calculation  Start Time: 0907  Stop Time: 0950  Time Calculation (min): 43 min       PT Therapeutic Procedures Time Entry  Manual Therapy Time Entry: 15  Therapeutic Exercise Time Entry: 25                   INSURANCE:  Visit number: 4/8  MEDICARE/ [employer] OF [place] 2230($0 USED ) COPAY 0 ,XBV361(MET) COVERAGE 80/100 OOP 0 MOM TO FOLLOW MEDICARE NO AUTH REQ [id number] // Paz confirmed 10/7/24 4:21pm    Referring Provider: Joey Cbob MD  Hx: HTN, OA, HAs, Ulcers    CURRENT PROBLEM:  1. Shoulder impingement  Follow Up In Physical Therapy      2. Acute pain of right shoulder  Follow Up In Physical Therapy        SUBJECTIVE:   General -   Pt is doing home exercises. Pt states that she feels about the same. Still pain by her shoulder blades.    Pain -    Pain Location/Description: R shoulder  Pain Today: 6/10    Precautions -    None    OBJECTIVE:     Treatment -   Therapeutic Exercise (37844) -  UBE Retro: 4 min  Open Books: 3 sec x15  Thoracic Chair Ext: 3 sec x15  Doorway rhomboid stretch: 30 sec x2  Scapular Retractions 3\" x15  Cervical Retractions 3\" x15  SA Slides: 2x10   T-band Row/Ext: GTB 2x10 ea B  T-band at wall B ER: RTB 2x10  T-band iso walkouts IR/ER YTB/RTB --  Supine TB Bilat HzABD RTB --  Seated Correct Posture --  UT/Levator Scapulae Stretches --  Supine SA punch --  Doorway Pec Stretch --  SL Shldr 4 way --  Manual Therapy ([id number]) -    Seated Cup Gliding/STM to UT, levator, medial border of scap, infraspinatus, and cervical paraspinals  Supine unloaded chin tucks, manual traction --    OP Patient Education -   HEP2Go:   Open Books, Thoracic extensions over chair, Rhomboid Door Jam stretch, SA Slides     ASSESSMENT:     Pt was introduced to more thoracic mobilization exercises for improved mobility of the cervicothoracic spine. Pt was introduced to a rhomboid stretch for improved flexibility " and reduced pain in shoulder blade region. Added SA slides for improved scapulothoracic stabilization. Pt was given an updated HEP. Pt tolerated session well and is progressing towards functional needs. Continue to progress pt within tolerance and POC.    PLAN:    Continue to progress pt within tolerance. Assess response to updated HEP. Continue to progress shoulder and cervical stability.

## 2024-11-13 ENCOUNTER — TREATMENT (OUTPATIENT)
Dept: OCCUPATIONAL THERAPY | Facility: CLINIC | Age: 69
End: 2024-11-13
Payer: MEDICARE

## 2024-11-13 ENCOUNTER — TREATMENT (OUTPATIENT)
Dept: PHYSICAL THERAPY | Facility: CLINIC | Age: 69
End: 2024-11-13
Payer: MEDICARE

## 2024-11-13 DIAGNOSIS — M25.511 ACUTE PAIN OF RIGHT SHOULDER: ICD-10-CM

## 2024-11-13 DIAGNOSIS — M77.8 EXTENSOR CARPI ULNARIS TENDINITIS: Primary | ICD-10-CM

## 2024-11-13 DIAGNOSIS — M25.819 SHOULDER IMPINGEMENT: ICD-10-CM

## 2024-11-13 PROCEDURE — 97140 MANUAL THERAPY 1/> REGIONS: CPT | Mod: GO | Performed by: OCCUPATIONAL THERAPIST

## 2024-11-13 PROCEDURE — 97110 THERAPEUTIC EXERCISES: CPT | Mod: GP | Performed by: PHYSICAL THERAPIST

## 2024-11-13 PROCEDURE — 97110 THERAPEUTIC EXERCISES: CPT | Mod: GO | Performed by: OCCUPATIONAL THERAPIST

## 2024-11-13 PROCEDURE — 97140 MANUAL THERAPY 1/> REGIONS: CPT | Mod: GP | Performed by: PHYSICAL THERAPIST

## 2024-11-13 ASSESSMENT — PAIN - FUNCTIONAL ASSESSMENT: PAIN_FUNCTIONAL_ASSESSMENT: 0-10

## 2024-11-13 NOTE — PROGRESS NOTES
"Physical Therapy Treatment    Patient Name: Aaron Gilbert  MRN: [id number]  Today's Date: 11/13/2024  Time Calculation  Start Time: 1037  Stop Time: 1117  Time Calculation (min): 40 min       PT Therapeutic Procedures Time Entry  Manual Therapy Time Entry: 13  Therapeutic Exercise Time Entry: 25                   INSURANCE:  Visit number: 5/8  MEDICARE/ [employer] OF [place] 2230($0 USED ) COPAY 0 ,YMQ738(MET) COVERAGE 80/100 OOP 0 MOM TO FOLLOW MEDICARE NO AUTH REQ [id number] // Paz confirmed 10/7/24 4:21pm     Referring Provider: Joey Cobb MD  Hx: HTN, OA, HAs, Ulcers    CURRENT PROBLEM:  1. Shoulder impingement  Follow Up In Physical Therapy      2. Acute pain of right shoulder  Follow Up In Physical Therapy        SUBJECTIVE:   General -   Pt is doing home exercises.  More into UT and neck today than in the shoulder blade.  Pt does feel like the cupping helped.    Pain -    Pain Today: 5/10    Precautions -    None    OBJECTIVE:     Treatment -   Therapeutic Exercise ([id number]) -  UBE Retro: 5 min  Thoracic Chair Ext: 3 sec x15  Doorway rhomboid stretch: 30 sec x2  Open Books: 3 sec x15  Scapular Retractions 5\" x20 (supine isometric)  Cervical Retractions 3\" x15 (supine)  SA Slides: YTB 2x10  SA Walkouts: YTB x10 B  T-band Row/Ext: GTB 2x10 ea B  T-band at wall B ER: RTB 2x10  Doorway Pec Stretch: 30 sec x2  T-band iso walkouts IR/ER YTB/RTB --  Supine TB Bilat HzABD RTB --  Seated Correct Posture --  UT/Levator Scapulae Stretches --  Supine SA punch --  SL Shldr 4 way --  Manual Therapy ([id number]) -    Seated Cup Gliding/STM to UT, levator, medial border of scap, infraspinatus, and cervical paraspinals  Supine unloaded chin tucks, manual traction --    OP Patient Education -   YTB given for home    ASSESSMENT:     Cupping gliding administered again to cervicothoracic musculature for down regulation and reduced pain/spasm. Pt was able to progress SA slides by adding t-band this visit with good tolerance. Pt " reported reduced pain to zero after therapy session. Pt tolerated session well and is progressing towards functional needs. Continue to progress pt within tolerance and POC.    PLAN:    Continue to progress pt within tolerance.

## 2024-11-13 NOTE — PROGRESS NOTES
Occupational Therapy   Upper Extremity Evaluation Note    Patient Name: Aaron Gilbert  MRN: [id number]  Referring Physician: Dr. Waldemar rAaya  Allergies:   NKA Date of Injury: x2 years  Mechanism of Injury: progressive   Date of Surgery: NA  Precautions: NA    Current Problem  1. Extensor carpi ulnaris tendinitis          *M77.8 (ICD-10-CM) - Extensor carpi ulnaris tendinitis Insurance      MEDICARE/ [employer] [place]   2230 ($0 USED ) COPAY 0 (MET) COVERAGE 80/100 OOP 0   MOM TO FOLLOW MEDICARE   NO AUTH REQ     Medicare Certification Period:   Beginning: 10/30/2024   Endin2025   GENERAL  General  General Comment: OT Visit #2/    IMAGING: None           Subjective  Patient has not had return of popping.    Patient would like to functionally improve: pain free hand function  Improvements: NA    Pain  Pain Assessment  Pain Assessment: 0-10  0-10 (Numeric) Pain Score:  (0-5/10)      Objective  Hand Dominance: RIGHT     Affected Extremity:   Left    Outcome Measures:        ADLS/IADLS: MOD IND    Skin/ Wound/Scar: Nothing remarkable    Sensation: INTACT Left HAND    Dexterity: Monitor    Special Tests: Negative pain with resistance to ECU    Edema (cm): None  AROM LUE WFL shoulder to hand        Hand Strength     Vipin Dynamometer    Gross Grasp (lbs)  Right Left   2nd setting 55 48     TREATMENT:  -Soft Tissue Mobilization manual techniques left forearm/wrist  -Isometrics  -Pour muscle retraining  Kinesiotape placed at  target tissue for support, mobilization, and comfort; Origin base 4th and 5th  diagonal placement of 5 inch I band towards elbow.   40% stretch placed on tissues.   HEP and Patient Education:  -Isometrics  -Pour muscle retraining  Soft Tissue Mobilization using Guasha Tool techniques left forearm/wrist  Kinesiotape placed at  target tissue for support, mobilization, and comfort; Origin base 4th and 5th MC diagonal placement of 5 inch I band towards elbow.   40% stretch placed on  tissues. Extra pieces issued.   -Educated patient to diagnosis and rehab expectations including frequency and duration of services.     ASSESSMENT:   Pain reduction.    Evaluation Data: Patient is a 68 yo RHDF  s/p progressive pain ECU resulting in limited IADL function. Alignment improved ulnar wrist given joint mobilization techniques this date of target tissues. Patient did not have pain or discomfort this session, however, mechanical release/alignment obtained during joint mobilization. Patient to return to clinic should discomfort or painful popping re-occur.   Skilled Occupational Therapy services are warranted if symptoms return to address the impairments found & listed previously in the objective section in order to return to safe and pain-free IADLs.  Home living/Social Support: Resides home, independently, with spouse and medium size canine (not walked, large back yard per patient report, no arm pulling with leash or pet care).   Occupation:  retired medical payroll  Meaningful Activities:  reading, gardening, and time spent time with family  Prior Level of Function Per Patient/Caregiver Report: Independent with IADL and leisure activities  Patient would like to gain recovery of their LEFT hand function to improve their level of independence with IADLs,     Factors to Consider that may affect serve as a barrier to the plan of car include:  -length of time between injury and care    PLAN OF CARE:     Follow Up with Referring Physician: Dr. Waldemar Araya as needed, 8 weeks after OT  Monitor home program.    Patient instructed to call or email with questions or concerns.    Frequency: 1x/week  Duration: 4weeks  Comprehensive reassessments will be completed intermittently.   Potential to achieve rehab goals is good . Patient would benefit from skilled Occupational Therapy services to address deficits and promote functional gains and return to timely completion of self care demands and IADL's.       GOALS:    Patient to demonstrate, with involved extremity (ies):    -good carry through with progressive soft tissue management techniques facilitating gains with motion and pain reduction.   -wrist AROM العلي 120 degrees without pain, to tolerate four point position during cleaning and shifting body position(s) and pouring liquids    Patient verbalized good understanding of Therapy Plan of Care and is in a agreement with Occupational Therapy Goals.     Problems To Be Addressed: Knowledge of precautions, knowledge of HEP, pain and edema management, ROM/joint mobility, coordination, motor skills, strength recovery, endurance recovery, orthosis use, wear/care, precautions.    Planned Interventions include: wound care as needed, patient education/instruction, home program instruction and review, edema control, manual therapy, motor coordination, therapeutic exercise, therapeutic activities, ADL and IADL retraining, neuromuscular re-education, dry needling, NMES, Fluidotherapy, ultrasound, Kinesiotaping, orthosis fabrication/fit training.    CHART REVIEW: YES    Time Calculation Time Calculation  Start Time: 1150  Stop Time: 1230  Time Calculation (min): 40 min     MODALITIES           TIME  MT Manual Therapy Time Entry: 20  NMR    TE Therapeutic Exercise Time Entry: 20  TA    SCA    ORTH TRAIN    ORTH SUB    LCODE  GRP

## 2024-11-20 ENCOUNTER — TREATMENT (OUTPATIENT)
Dept: OCCUPATIONAL THERAPY | Facility: CLINIC | Age: 69
End: 2024-11-20
Payer: MEDICARE

## 2024-11-20 ENCOUNTER — TREATMENT (OUTPATIENT)
Dept: PHYSICAL THERAPY | Facility: CLINIC | Age: 69
End: 2024-11-20
Payer: MEDICARE

## 2024-11-20 DIAGNOSIS — M25.819 SHOULDER IMPINGEMENT: ICD-10-CM

## 2024-11-20 DIAGNOSIS — M25.511 ACUTE PAIN OF RIGHT SHOULDER: ICD-10-CM

## 2024-11-20 DIAGNOSIS — M77.8 EXTENSOR CARPI ULNARIS TENDINITIS: Primary | ICD-10-CM

## 2024-11-20 PROCEDURE — 97140 MANUAL THERAPY 1/> REGIONS: CPT | Mod: GO | Performed by: OCCUPATIONAL THERAPIST

## 2024-11-20 PROCEDURE — 97110 THERAPEUTIC EXERCISES: CPT | Mod: GP | Performed by: PHYSICAL THERAPIST

## 2024-11-20 PROCEDURE — 97110 THERAPEUTIC EXERCISES: CPT | Mod: GO | Performed by: OCCUPATIONAL THERAPIST

## 2024-11-20 PROCEDURE — 97140 MANUAL THERAPY 1/> REGIONS: CPT | Mod: GP | Performed by: PHYSICAL THERAPIST

## 2024-11-20 ASSESSMENT — PAIN - FUNCTIONAL ASSESSMENT: PAIN_FUNCTIONAL_ASSESSMENT: 0-10

## 2024-11-20 NOTE — PROGRESS NOTES
"Physical Therapy Treatment    Patient Name: Aaron Gilbert  MRN: [id number]  Today's Date: 11/20/2024  Time Calculation  Start Time: 1034  Stop Time: 1115  Time Calculation (min): 41 min       PT Therapeutic Procedures Time Entry  Manual Therapy Time Entry: 8  Therapeutic Exercise Time Entry: 30                   INSURANCE:  Visit number: 6/8  MEDICARE/ [employer] OF [place] 2230($0 USED ) COPAY 0 ,JDP491(MET) COVERAGE 80/100 OOP 0 MOM TO FOLLOW MEDICARE NO AUTH REQ [id number] // Paz confirmed 10/7/24 4:21pm     Referring Provider: Joey Cobb MD  Hx: HTN, OA, HAs, Ulcers    CURRENT PROBLEM:  1. Shoulder impingement  Follow Up In Physical Therapy      2. Acute pain of right shoulder  Follow Up In Physical Therapy        SUBJECTIVE:   General -   Pt is doing home exercises.  Pt states that she is starting to feel better and she has no pain as of right now.    Pain -    Pain Location/Description: R shoulder blade  Pain Today: 0/10    Precautions -    None    OBJECTIVE:     Treatment -   Therapeutic Exercise ([id number]) -  UBE Retro: 5 min  Thoracic Chair Ext: 3 sec x15  Open Books: 3 sec x15 (kneeling)   SA Slides: RTB 2x10  SA Walkouts: RTB x15 B  T-band at wall B ER: RTB 3x10  T-band Row/Ext: GTB 3x10 ea B  Wall Push Up Plus: x10  Ball on wall: x20 ea  Doorway rhomboid stretch: --  Doorway Pec Stretch: --  Scapular Retractions 5\" --  Cervical Retractions 3\" --  T-band IR/ER YTB/RTB --  Supine TB Bilat HzABD RTB --  Seated Correct Posture --  UT/Levator Scapulae Stretches --  Supine SA punch --  SL Shldr 4 way --  Manual Therapy ([id number]) -    Seated Cup Gliding/STM to UT, levator, medial border of scap, infraspinatus, and cervical paraspinals  Supine unloaded chin tucks, manual traction --    ASSESSMENT:     Continued with manual and thoracic spine mobility exercises for improved scapulo-thoracic movement. Pt was introduced to wall push up plus and was able to increase t-band resistance for SA slides, which she tolerated " well. Added ball on wall with cueing for correct posture of scapula; pt required rest breaks due to fatigue. Pt tolerated session well and is progressing towards functional needs. Continue to progress pt within tolerance and POC.    PLAN:    Continue to progress pt within tolerance. Continue with scapulothoracic mobility and stabilization.

## 2024-11-20 NOTE — PROGRESS NOTES
Occupational Therapy   Upper Extremity Evaluation Note    Patient Name: Aaron Gilbert  MRN: [id number]  Referring Physician: Dr. Waldemar Araya  Allergies:   NKA Date of Injury: x2 years  Mechanism of Injury: progressive   Date of Surgery: NA  Precautions: NA    Current Problem  1. Extensor carpi ulnaris tendinitis            *M77.8 (ICD-10-CM) - Extensor carpi ulnaris tendinitis Insurance      MEDICARE/ [place] [employer] [place]   2230 ($0 USED ) COPAY 0 (MET) COVERAGE 80/100 OOP 0   MOM TO FOLLOW MEDICARE   NO AUTH REQ     Medicare Certification Period:   Beginning: 10/30/2024   Endin2025   GENERAL  General  General Comment: OT Visit #3/4    IMAGING: None           Subjective  Patient has not had return of popping at ECU and pain is not in this area.   Patient reports pain along FCU; pain is described as sharp.  Patient working on right shoulder needs with PT.     Patient would like to functionally improve: pain free hand function  Improvements: NA    Pain  Pain Assessment  Pain Assessment: 0-10  0-10 (Numeric) Pain Score:  (Left ECU 0/10. Left FCU 7/10 Sharp.)      Objective  Hand Dominance: RIGHT     Affected Extremity:   Left    Outcome Measures: 5 Quick Dash       ADLS/IADLS: MOD IND    Skin/ Wound/Scar: Nothing remarkable    Sensation: INTACT Left HAND    Dexterity: Monitor    Special Tests: Negative pain with resistance to ECU    Edema (cm): None  AROM LUE WFL shoulder to hand        Hand Strength     Vipin Dynamometer    Gross Grasp (lbs)  Right Left   2nd setting 55 48     TREATMENT:  -AROM forearm and wrist with Fluidotherapy  -Soft Tissue Mobilization manual techniques left forearm/wrist including Dycem  -2# wrist PREs  -Level 3 red putty  and pinch training  -P/S wheel on red putty  Kinesiotape placed ECU and FCU 40% tension  HEP and Patient Education:  -2# wrist PREs  -Level 3 red putty  and pinch training  Soft Tissue Mobilization using Guasha Tool techniques left forearm/wrist and  Rosalba  Kinesiotape FCU and ECU 40% stretch placed on tissues.   -Educated patient to diagnosis and rehab expectations including frequency and duration of services.     ASSESSMENT:   Pain resolved Left ECU. Mild pull during supination.  New onset localized transient sharp pain Left FCU.   No pain either tendon after session including 2# resistance.     Evaluation Data: Patient is a 70 yo RHDF  s/p progressive pain ECU resulting in limited IADL function. Alignment improved ulnar wrist given joint mobilization techniques this date of target tissues. Patient did not have pain or discomfort this session, however, mechanical release/alignment obtained during joint mobilization. Patient to return to clinic should discomfort or painful popping re-occur.   Skilled Occupational Therapy services are warranted if symptoms return to address the impairments found & listed previously in the objective section in order to return to safe and pain-free IADLs.  Home living/Social Support: Resides home, independently, with spouse and medium size canine (not walked, large back yard per patient report, no arm pulling with leash or pet care).   Occupation:  retired medical payroll  Meaningful Activities:  reading, gardening, and time spent time with family  Prior Level of Function Per Patient/Caregiver Report: Independent with IADL and leisure activities  Patient would like to gain recovery of their LEFT hand function to improve their level of independence with IADLs,     Factors to Consider that may affect serve as a barrier to the plan of car include:  -length of time between injury and care    PLAN OF CARE:     Follow Up with Referring Physician: Dr. Waldemar Araya as needed, 8 weeks after OT  Monitor home program.    Patient instructed to call or email with questions or concerns.    Frequency: 1x/week  Duration: 4weeks  Comprehensive reassessments will be completed intermittently.   Potential to achieve rehab goals is good . Patient  would benefit from skilled Occupational Therapy services to address deficits and promote functional gains and return to timely completion of self care demands and IADL's.       GOALS:   Patient to demonstrate, with involved extremity (ies):    -good carry through with progressive soft tissue management techniques facilitating gains with motion and pain reduction.   -wrist AROM العلي 120 degrees without pain, to tolerate four point position during cleaning and shifting body position(s) and pouring liquids    Patient verbalized good understanding of Therapy Plan of Care and is in a agreement with Occupational Therapy Goals.     Problems To Be Addressed: Knowledge of precautions, knowledge of HEP, pain and edema management, ROM/joint mobility, coordination, motor skills, strength recovery, endurance recovery, orthosis use, wear/care, precautions.    Planned Interventions include: wound care as needed, patient education/instruction, home program instruction and review, edema control, manual therapy, motor coordination, therapeutic exercise, therapeutic activities, ADL and IADL retraining, neuromuscular re-education, dry needling, NMES, Fluidotherapy, ultrasound, Kinesiotaping, orthosis fabrication/fit training.    CHART REVIEW: YES    Time Calculation Time Calculation  Start Time: 1150  Stop Time: 1230  Time Calculation (min): 40 min     MODALITIES           TIME  MT Manual Therapy Time Entry: 15  NMR    TE Therapeutic Exercise Time Entry: 25  TA    SCA    ORTH TRAIN    ORTH SUB    LCODE  GRP

## 2024-11-22 DIAGNOSIS — J32.9 CHRONIC SINUSITIS, UNSPECIFIED LOCATION: Primary | ICD-10-CM

## 2024-11-23 DIAGNOSIS — J01.90 ACUTE NON-RECURRENT SINUSITIS, UNSPECIFIED LOCATION: Primary | ICD-10-CM

## 2024-11-25 RX ORDER — AZITHROMYCIN 250 MG/1
TABLET, FILM COATED ORAL
Qty: 6 TABLET | Refills: 0 | Status: SHIPPED | OUTPATIENT
Start: 2024-11-25 | End: 2024-11-29

## 2024-12-03 ENCOUNTER — OFFICE VISIT (OUTPATIENT)
Dept: ORTHOPEDIC SURGERY | Facility: CLINIC | Age: 69
End: 2024-12-03
Payer: MEDICARE

## 2024-12-03 DIAGNOSIS — M77.8 EXTENSOR CARPI ULNARIS TENDINITIS: Primary | ICD-10-CM

## 2024-12-03 PROCEDURE — 1123F ACP DISCUSS/DSCN MKR DOCD: CPT | Performed by: ORTHOPAEDIC SURGERY

## 2024-12-03 PROCEDURE — 99214 OFFICE O/P EST MOD 30 MIN: CPT | Performed by: ORTHOPAEDIC SURGERY

## 2024-12-03 PROCEDURE — 1036F TOBACCO NON-USER: CPT | Performed by: ORTHOPAEDIC SURGERY

## 2024-12-03 PROCEDURE — 20550 NJX 1 TENDON SHEATH/LIGAMENT: CPT | Performed by: ORTHOPAEDIC SURGERY

## 2024-12-03 PROCEDURE — 2500000004 HC RX 250 GENERAL PHARMACY W/ HCPCS (ALT 636 FOR OP/ED): Performed by: ORTHOPAEDIC SURGERY

## 2024-12-03 PROCEDURE — 1159F MED LIST DOCD IN RCRD: CPT | Performed by: ORTHOPAEDIC SURGERY

## 2024-12-03 RX ORDER — LIDOCAINE HYDROCHLORIDE 10 MG/ML
1 INJECTION, SOLUTION INFILTRATION; PERINEURAL
Status: COMPLETED | OUTPATIENT
Start: 2024-12-03 | End: 2024-12-03

## 2024-12-03 NOTE — PROGRESS NOTES
12/3/2024    Chief Complaint   Patient presents with    Left Wrist - Tendonitis     ECU tendonitis s/p inj 7/2/24       History of Present Illness:  Patient Aaron Gilbert , 69 y.o. female, presents today, 12/3/2024, for evaluation of left wrist  ulnar-sided pain with concerns for ECU tendinitis .  She underwent injection for this in July of this year and got great symptomatic relief.  She had some intermittent mechanical symptoms with instability concerns.  She did formal therapy.  She states that over the last couple months pain has begun to return in that area but she denies any sensation of popping or catching or instability.       Review of Systems:   GENERAL: Negative  GI: Negative  MUSCULOSKELETAL: See HPI  SKIN: Negative  NEURO:  Negative     Physical Exam:  GENERAL:  Alert and oriented to person, place, and time.  No acute distress and breathing comfortably; pleasant and cooperative with the examination.  HEENT:  Head is normocephalic and atraumatic.  NECK:  Supple, no visible swelling.  CARDIOVASCULAR:  No palpable tachycardia.  LUNGS:  No audible wheezing or labored breathing.  ABDOMEN:  Nondistended.  Extremities: Evaluation of left upper extremity finds the patient to have a palpable radial artery at the wrist with brisk capillary refill to all digits. The patient has intact sensorium to axillary, radial, median and ulnar nerves. There are no open wounds. There are no signs of infection. There is no evidence of lymphedema or lymphatic streaking. The patient has supple compartments of the left arm, forearm and hand.  She is tender over the ECU tendon sheath at its insertion extending about 2 cm proximally.  Pain with resisted wrist extension.     Imaging/Test Results:  None today.     Assessment:  Left wrist ECU tendinitis.     Plan:  Treatment options were reviewed including operative and nonoperative strategies.  Patient had good benefit from injection through ECU tendon sheath previously.  We  recommend for repeating this today.  This was performed in office today by Dr. Araya and tolerated well by patient.  Continue with weightbearing activities to tolerance.  Follow-up with our office in as-needed basis as symptoms dictate.  All questions answered today's visit.        Hand / UE Inj/Asp for (L ECU tendon) on 12/3/2024 2:57 PM  Indications: pain and tendon swelling  Details: 25 G needle, radial approach  Medications: 10 mg triamcinolone acetonide 10 mg/mL; 1 mL lidocaine 10 mg/mL (1 %)  Outcome: tolerated well, no immediate complications    Left ECU tendon Injection: It was explained to the patient that the risks of a steroid injection include but are not limited to infection, local skin irritation, skin atrophy, calcification, continued pain and discomfort, elevation of blood sugar, burning, failure to relieve pain, and possible late infection. The patient verbalized good insight and verbalized consent for the injection. It was further explained that post injection discomfort can be alleviated with additional medications, ice, elevation, and rest over the first 24 hours, and these modalities are recommended.     Using aseptic technique, a solution containing 10 mg of Kenalog and 1 mL of 1% lidocaine without epinephrine was injected into the left wrist first dorsal compartment tendon sheath. This was done by palpating the radial styloid, and with active participation from the patient we were able to localize the tendons of the first dorsal compartment. The skin was prepped and the needle slowly advanced until the needle tip was in the tendon sheath. The solution was then slowly administered and the patient tolerated it well. A band-aid was placed. It should be noted that ethyl chloride spray was used to make the injection delivery more comfortable for the patient.  Procedure, treatment alternatives, risks and benefits explained, specific risks discussed. Consent was given by the patient. Immediately  prior to procedure a time out was called to verify the correct patient, procedure, equipment, support staff and site/side marked as required. Patient was prepped and draped in the usual sterile fashion.         In a face to face encounter, I performed a history and physical examination, discussed pertinent diagnostic studies if indicated, and discussed diagnosis and management strategies with both the patient and the mid-level provider. I reviewed the mid-level's note and agree with the documented findings and plan of care.  Patient presents for evaluation of dorsal ulnar left wrist pain.  Tenderness over distal course left wrist ECU tendon.  Previous injection within that zone work well but now symptoms have returned.  Treatment options were discussed.  Patient elects for repeat trial of left wrist ECU tendon sheath injection and follow-up when symptoms dictate.  Patient is agreeable with this strategy.

## 2024-12-05 ENCOUNTER — APPOINTMENT (OUTPATIENT)
Dept: GASTROENTEROLOGY | Facility: CLINIC | Age: 69
End: 2024-12-05
Payer: MEDICARE

## 2024-12-19 ENCOUNTER — APPOINTMENT (OUTPATIENT)
Dept: PRIMARY CARE | Facility: CLINIC | Age: 69
End: 2024-12-19
Payer: MEDICARE

## 2024-12-19 VITALS
HEIGHT: 63 IN | RESPIRATION RATE: 16 BRPM | HEART RATE: 76 BPM | SYSTOLIC BLOOD PRESSURE: 136 MMHG | OXYGEN SATURATION: 100 % | TEMPERATURE: 97.2 F | DIASTOLIC BLOOD PRESSURE: 80 MMHG | WEIGHT: 162 LBS | BODY MASS INDEX: 28.7 KG/M2

## 2024-12-19 DIAGNOSIS — J30.2 SEASONAL ALLERGIES: ICD-10-CM

## 2024-12-19 DIAGNOSIS — I10 BENIGN ESSENTIAL HTN: Primary | ICD-10-CM

## 2024-12-19 DIAGNOSIS — I10 ESSENTIAL (PRIMARY) HYPERTENSION: ICD-10-CM

## 2024-12-19 DIAGNOSIS — E66.3 OVERWEIGHT WITH BODY MASS INDEX (BMI) OF 28 TO 28.9 IN ADULT: ICD-10-CM

## 2024-12-19 PROCEDURE — G2211 COMPLEX E/M VISIT ADD ON: HCPCS | Performed by: FAMILY MEDICINE

## 2024-12-19 PROCEDURE — 1159F MED LIST DOCD IN RCRD: CPT | Performed by: FAMILY MEDICINE

## 2024-12-19 PROCEDURE — 99213 OFFICE O/P EST LOW 20 MIN: CPT | Performed by: FAMILY MEDICINE

## 2024-12-19 PROCEDURE — 3078F DIAST BP <80 MM HG: CPT | Performed by: FAMILY MEDICINE

## 2024-12-19 PROCEDURE — 3008F BODY MASS INDEX DOCD: CPT | Performed by: FAMILY MEDICINE

## 2024-12-19 PROCEDURE — 1123F ACP DISCUSS/DSCN MKR DOCD: CPT | Performed by: FAMILY MEDICINE

## 2024-12-19 PROCEDURE — 1160F RVW MEDS BY RX/DR IN RCRD: CPT | Performed by: FAMILY MEDICINE

## 2024-12-19 PROCEDURE — 3074F SYST BP LT 130 MM HG: CPT | Performed by: FAMILY MEDICINE

## 2024-12-19 RX ORDER — INDAPAMIDE 1.25 MG/1
1.25 TABLET ORAL EVERY MORNING
Qty: 90 TABLET | Refills: 3 | Status: SHIPPED | OUTPATIENT
Start: 2024-12-19 | End: 2025-12-19

## 2024-12-19 RX ORDER — FLUTICASONE PROPIONATE 50 MCG
2 SPRAY, SUSPENSION (ML) NASAL DAILY
Qty: 16 G | Refills: 1 | Status: SHIPPED | OUTPATIENT
Start: 2024-12-19 | End: 2025-12-19

## 2024-12-19 ASSESSMENT — PATIENT HEALTH QUESTIONNAIRE - PHQ9
1. LITTLE INTEREST OR PLEASURE IN DOING THINGS: NOT AT ALL
SUM OF ALL RESPONSES TO PHQ9 QUESTIONS 1 AND 2: 0
2. FEELING DOWN, DEPRESSED OR HOPELESS: NOT AT ALL

## 2024-12-20 ASSESSMENT — ENCOUNTER SYMPTOMS
SHORTNESS OF BREATH: 0
HYPERTENSION: 1
NECK PAIN: 0

## 2024-12-20 NOTE — PROGRESS NOTES
Subjective   Patient ID: Aaron Gilbert is a 69 y.o. female who presents for Follow-up. I last saw the patient  9/9/2024    Hypertension  Pertinent negatives include no anxiety, chest pain, neck pain or shortness of breath. There is no history of kidney disease or heart failure.      Patient is here to discuss medications    Past medical, surgical, and family history reviewed.  Reviewed and documented all medications   Pt eating well, exercising as tolerated and taking medications as directed    Has no refill requests or medical concerns for rooming MA    Patient mentions having headaches all over the head and sometimes neck pain and back pain. She mentions getting injections for her back pain and wonders if her pain causes elevation in her blood pressures. She denies any pedal edema.    Patient recently had bilateral cataract extractions.    Review of Systems   Respiratory:  Negative for shortness of breath.    Cardiovascular:  Negative for chest pain.   Musculoskeletal:  Negative for neck pain.     Except positives as noted in the CC & HPI      Constitutional: Denies fevers, chills, night sweats, fatigue, weight changes, change in appetite    Eyes: Denies blurry vision, double vision    ENT: Denies otalgia, trouble hearing, tinnitus, vertigo, nasal congestion, rhinorrhea, sore throat    Neck: Denies swelling, masses    Cardiovascular: Denies chest pain, palpitations, edema, orthopnea, syncope    Respiratory: Denies dyspnea, cough, wheezing, postural nocturnal dyspnea    Gastrointestinal: Denies abdominal pain, nausea, vomiting, diarrhea, constipation, melena, hematochezia    Genitourinary: Denies dysuria, hematuria, frequency, urgency    Musculoskeletal: Denies back pain, neck pain, arthralgias, myalgias    Integumentary: Denies skin lesions, rashes, masses    Neurological: Denies dizziness, headaches, confusion, limb weakness, paresthesias, syncope, convulsions    Psychiatric: Denies depression, homicidal  "ideations, suicidal ideations, sleep disturbances    Endocrine: Denies polyphagia, polydipsia, polyuria, weakness, hair thinning, heat intolerance, cold intolerance, weight changes    Heme/Lymph: Denies easy bruising, easy bleeding, swollen glands    Objective   Vitals:    12/19/24 1446 12/19/24 1543   BP: 120/72 136/80   BP Location:  Right arm   Patient Position:  Sitting   BP Cuff Size:  Adult   Pulse: 58 76   Resp: 16    Temp: 36.2 °C (97.2 °F)    SpO2: 100%    Weight: 73.5 kg (162 lb)    Height: 1.6 m (5' 3\")         Physical Exam    Gen. Appearance - well-developed, well-nourished, 69 y.o., White female in no acute distress.     Skin - warm, pink and dry without rash or concerning lesions.     Mental Status - alert and oriented times 3. Normal mood and affect appropriate to mood.     Neck - supple without lymphadenopathy. Carotid pulses are normal without bruits. Thyroid is normal in midline without nodules.    Chest - lungs are clear to auscultation without rales, rhonchi or wheezes.     Heart - regular, rate, and rhythm without murmurs, rubs or gallops.     Abdomen - soft, mildly obese, nontender, nondistended. No masses, hepatomegaly or splenomegaly is noted. No rebound, rigidity or guarding is noted. Bowel sounds are normoactive.     Extremities - no cyanosis, clubbing or edema. Pedal pulses are 2+ normal at the dorsalis pedis and posterior tibial pulses bilaterally.     Neurological - cranial nerves II through XII are grossly intact. Motor strength 5/5 at all fours.     Assessment/Plan   1. Benign essential HTN  indapamide (Lozol) 1.25 mg tablet      2. Seasonal allergies  fluticasone (Flonase) 50 mcg/actuation nasal spray      3. Essential (primary) hypertension        4. Overweight with body mass index (BMI) of 28 to 28.9 in adult          Patient to continue current medications (with any exceptions as noted) and diet. Follow-up in 2 month(s) otherwise as needed.      Patient was started on: "   Indapamide 1.25 mg, TAKE 1 TAB BY MOUTH DAILY   Fluticasone Propionate 50 mcg/act, INSTILL 2 SPRAYS IN EACH NOSTRIL ONE HOUR PRIOR TO BEDTIME     Rx(s) sent to pharmacy.      Patient was advised to limit their salt intake and to not add any extra salt to their food. Patient is to avoid pretzels, chips, lunch meats, canned soups, soda pop, ham, jauregui, hot dogs, etc.     Encouraged patient to drink plenty of fluids, at least 64 oz of water daily.      Patient is to follow up with pain management as scheduled.     Scribe Attestation  By signing my name below, ILula Scribe   attest that this documentation has been prepared under the direction and in the presence of Landon Wells MD.

## 2024-12-30 ENCOUNTER — HOSPITAL ENCOUNTER (OUTPATIENT)
Dept: RADIOLOGY | Facility: CLINIC | Age: 69
Discharge: HOME | End: 2024-12-30
Payer: MEDICARE

## 2024-12-30 DIAGNOSIS — M54.2 CERVICAL PAIN: ICD-10-CM

## 2024-12-30 PROCEDURE — 72040 X-RAY EXAM NECK SPINE 2-3 VW: CPT | Performed by: RADIOLOGY

## 2024-12-30 PROCEDURE — 72040 X-RAY EXAM NECK SPINE 2-3 VW: CPT

## 2025-01-06 ENCOUNTER — APPOINTMENT (OUTPATIENT)
Dept: PHYSICAL THERAPY | Facility: CLINIC | Age: 70
End: 2025-01-06
Payer: MEDICARE

## 2025-01-09 ENCOUNTER — OFFICE VISIT (OUTPATIENT)
Dept: ORTHOPEDIC SURGERY | Facility: CLINIC | Age: 70
End: 2025-01-09
Payer: MEDICARE

## 2025-01-09 DIAGNOSIS — M70.61 TROCHANTERIC BURSITIS OF RIGHT HIP: ICD-10-CM

## 2025-01-09 PROCEDURE — 1123F ACP DISCUSS/DSCN MKR DOCD: CPT | Performed by: ORTHOPAEDIC SURGERY

## 2025-01-09 PROCEDURE — 99213 OFFICE O/P EST LOW 20 MIN: CPT | Performed by: ORTHOPAEDIC SURGERY

## 2025-01-09 NOTE — PROGRESS NOTES
History of present illness: History of right hip lateral bursal pain    Hip x-rays are relatively unremarkable from April 2024.  Patient had a bursal shot right hip which only helped for a couple of days    She had extensive workup of her back including injections without much relief pain returns over the lateral aspect of the right hip there is been no recent trauma    Physical exam:    General: No acute distress or breathing difficulty or discomfort, pleasant and cooperative with the examination.    Extremities: The affected left hip was examined and inspected.  There was tenderness to touch along the groin and over the lateral aspect of the hip over the bursal area.  Hip joint moves freely.    There was no pain over the groin area to internal/external rotation abduction.    Palpable reproducible pain was reproduced with palpation over the lateral trochanteric process.  There was a tight IT band with a positive Netta sign.      The skin was intact without breakdown or open wound.  Old incisions of present were all healed.  There was mild crepitus seen with internal and external rotation and range of motion without evidence of gross instability.    Inspection of the low back showed normal curvature integrity of the skin.  The strength and stability of the low back and ligaments were within normal limits.    There was a normal straight leg raise with no foot drop, numbness or tingling in the bilateral lower extremities.  Sensation, reflexes and pulses in the foot and ankle are preserved and present.  There was no obvious effusion.    Range of motion showed flexion to beyond 100 degrees degrees, abduction to 30 degrees, external rotation to 15 degrees and 18 degrees of internal rotation.  The patient had the ability to bear weight but with discomfort.  The patient's gait antalgic secondary to discomfort        Diagnostic studies: No new x-rays    Impression: Right hip recurrent bursitis    Plan: MRI right hip to  evaluate for abductor muscle tear as she has not responded to PT therapy bursa cortisone shots back injections recent oral prednisone Dosepak and Flexeril through pain management

## 2025-01-15 DIAGNOSIS — F43.23 ADJUSTMENT DISORDER WITH MIXED ANXIETY AND DEPRESSED MOOD: ICD-10-CM

## 2025-01-15 RX ORDER — FLUOXETINE 10 MG/1
CAPSULE ORAL
Qty: 90 CAPSULE | Refills: 0 | OUTPATIENT
Start: 2025-01-15

## 2025-01-16 ENCOUNTER — TELEPHONE (OUTPATIENT)
Dept: PRIMARY CARE | Facility: CLINIC | Age: 70
End: 2025-01-16
Payer: MEDICARE

## 2025-01-16 ENCOUNTER — APPOINTMENT (OUTPATIENT)
Dept: PHYSICAL THERAPY | Facility: CLINIC | Age: 70
End: 2025-01-16
Payer: MEDICARE

## 2025-01-16 DIAGNOSIS — F32.A DEPRESSION, UNSPECIFIED DEPRESSION TYPE: Primary | ICD-10-CM

## 2025-01-16 DIAGNOSIS — J30.2 SEASONAL ALLERGIES: ICD-10-CM

## 2025-01-16 RX ORDER — MONTELUKAST SODIUM 10 MG/1
10 TABLET ORAL DAILY
Qty: 90 TABLET | Refills: 0 | Status: SHIPPED | OUTPATIENT
Start: 2025-01-16

## 2025-01-16 RX ORDER — FLUOXETINE 10 MG/1
10 CAPSULE ORAL DAILY
Qty: 30 CAPSULE | Refills: 1 | Status: SHIPPED | OUTPATIENT
Start: 2025-01-16

## 2025-01-16 NOTE — TELEPHONE ENCOUNTER
Rx Refill Request     Name: Aaron Gilbert  :  1955   Medication Name:  PROZAC 10MG    Specific Pharmacy location:  [facility]  Date of last appointment:  2024   Date of next appointment:  2025   Best number to reach patient:  [phone]

## 2025-01-21 ENCOUNTER — APPOINTMENT (OUTPATIENT)
Dept: OTOLARYNGOLOGY | Facility: CLINIC | Age: 70
End: 2025-01-21
Payer: MEDICARE

## 2025-01-29 ENCOUNTER — APPOINTMENT (OUTPATIENT)
Dept: RADIOLOGY | Facility: CLINIC | Age: 70
End: 2025-01-29
Payer: MEDICARE

## 2025-02-03 ENCOUNTER — APPOINTMENT (OUTPATIENT)
Dept: PHYSICAL THERAPY | Facility: CLINIC | Age: 70
End: 2025-02-03
Payer: MEDICARE

## 2025-02-05 DIAGNOSIS — K58.1 IRRITABLE BOWEL SYNDROME WITH PREDOMINANT CONSTIPATION: ICD-10-CM

## 2025-02-05 RX ORDER — PANTOPRAZOLE SODIUM 40 MG/1
40 TABLET, DELAYED RELEASE ORAL
Qty: 30 TABLET | Refills: 0 | Status: SHIPPED | OUTPATIENT
Start: 2025-02-05

## 2025-02-05 NOTE — TELEPHONE ENCOUNTER
Rx Refill Request     Name: Aaron Gilbert  :  1955     Date of last appointment:  2024   Date of next appointment:  2025   Best number to reach patient:  [phone]

## 2025-02-06 ENCOUNTER — APPOINTMENT (OUTPATIENT)
Dept: ORTHOPEDIC SURGERY | Facility: CLINIC | Age: 70
End: 2025-02-06
Payer: MEDICARE

## 2025-02-08 ENCOUNTER — HOSPITAL ENCOUNTER (OUTPATIENT)
Dept: RADIOLOGY | Facility: HOSPITAL | Age: 70
Discharge: HOME | End: 2025-02-08
Payer: MEDICARE

## 2025-02-08 DIAGNOSIS — M70.61 TROCHANTERIC BURSITIS OF RIGHT HIP: ICD-10-CM

## 2025-02-08 PROCEDURE — 73721 MRI JNT OF LWR EXTRE W/O DYE: CPT | Mod: RT

## 2025-02-08 PROCEDURE — 73721 MRI JNT OF LWR EXTRE W/O DYE: CPT | Mod: RIGHT SIDE | Performed by: RADIOLOGY

## 2025-02-11 ENCOUNTER — APPOINTMENT (OUTPATIENT)
Dept: OTOLARYNGOLOGY | Facility: CLINIC | Age: 70
End: 2025-02-11
Payer: MEDICARE

## 2025-02-12 ENCOUNTER — APPOINTMENT (OUTPATIENT)
Dept: RADIOLOGY | Facility: CLINIC | Age: 70
End: 2025-02-12
Payer: MEDICARE

## 2025-02-13 ENCOUNTER — OFFICE VISIT (OUTPATIENT)
Dept: ORTHOPEDIC SURGERY | Facility: CLINIC | Age: 70
End: 2025-02-13
Payer: MEDICARE

## 2025-02-13 DIAGNOSIS — M70.71 BURSITIS OF RIGHT HIP, UNSPECIFIED BURSA: ICD-10-CM

## 2025-02-13 DIAGNOSIS — M76.891 HIP ABDUCTOR TENDINITIS, RIGHT: ICD-10-CM

## 2025-02-13 PROCEDURE — 1123F ACP DISCUSS/DSCN MKR DOCD: CPT | Performed by: ORTHOPAEDIC SURGERY

## 2025-02-13 PROCEDURE — 2500000004 HC RX 250 GENERAL PHARMACY W/ HCPCS (ALT 636 FOR OP/ED): Performed by: ORTHOPAEDIC SURGERY

## 2025-02-13 PROCEDURE — 99214 OFFICE O/P EST MOD 30 MIN: CPT | Performed by: ORTHOPAEDIC SURGERY

## 2025-02-13 PROCEDURE — 99213 OFFICE O/P EST LOW 20 MIN: CPT | Mod: 25 | Performed by: ORTHOPAEDIC SURGERY

## 2025-02-13 PROCEDURE — 20610 DRAIN/INJ JOINT/BURSA W/O US: CPT | Mod: RT | Performed by: ORTHOPAEDIC SURGERY

## 2025-02-13 RX ORDER — BETAMETHASONE SODIUM PHOSPHATE AND BETAMETHASONE ACETATE 3; 3 MG/ML; MG/ML
2 INJECTION, SUSPENSION INTRA-ARTICULAR; INTRALESIONAL; INTRAMUSCULAR; SOFT TISSUE
Status: COMPLETED | OUTPATIENT
Start: 2025-02-13 | End: 2025-02-13

## 2025-02-13 RX ORDER — LIDOCAINE HYDROCHLORIDE 10 MG/ML
5 INJECTION, SOLUTION INFILTRATION; PERINEURAL
Status: COMPLETED | OUTPATIENT
Start: 2025-02-13 | End: 2025-02-13

## 2025-02-13 RX ORDER — CYCLOBENZAPRINE HCL 10 MG
10 TABLET ORAL NIGHTLY
Qty: 10 TABLET | Refills: 0 | Status: SHIPPED | OUTPATIENT
Start: 2025-02-13 | End: 2025-02-23

## 2025-02-13 RX ADMIN — LIDOCAINE HYDROCHLORIDE 5 ML: 10 INJECTION, SOLUTION INFILTRATION; PERINEURAL at 08:35

## 2025-02-13 RX ADMIN — BETAMETHASONE SODIUM PHOSPHATE AND BETAMETHASONE ACETATE 2 ML: 3; 3 INJECTION, SUSPENSION INTRA-ARTICULAR; INTRALESIONAL; INTRAMUSCULAR at 08:35

## 2025-02-13 NOTE — PROGRESS NOTES
History of present illness: History patient with a history of right trochanteric bursitis that she had failed a previous injection had a big workup therapy injections back workup had subsequent hip MRI    Physical exam:    General: No acute distress or breathing difficulty or discomfort, pleasant and cooperative with the examination.    Extremities: The affected left hip was examined and inspected.  There was tenderness to touch along the groin and over the lateral aspect of the hip over the bursal area.  Hip joint moves freely.    There was no pain over the groin area to internal/external rotation abduction.    Palpable reproducible pain was reproduced with palpation over the lateral trochanteric process.  There was a tight IT band with a positive Netta sign.      The skin was intact without breakdown or open wound.  Old incisions of present were all healed.  There was mild crepitus seen with internal and external rotation and range of motion without evidence of gross instability.    Inspection of the low back showed normal curvature integrity of the skin.  The strength and stability of the low back and ligaments were within normal limits.    There was a normal straight leg raise with no foot drop, numbness or tingling in the bilateral lower extremities.  Sensation, reflexes and pulses in the foot and ankle are preserved and present.  There was no obvious effusion.    Range of motion showed flexion to beyond 100 degrees degrees, abduction to 30 degrees, external rotation to 15 degrees and 18 degrees of internal rotation.  The patient had the ability to bear weight but with discomfort.  The patient's gait antalgic secondary to discomfort      Before aspiration injection the risks of a cortisone injection including infection, local skin irritation, skin atrophy, calcification, continued pain and discomfort, elevated blood sugar, burning, failure to relieve pain, possible late infection were discussed with the  patient.    Postprocedure discomfort can be alleviated with additional medications, ice, elevation, rest over the first 24 hours as recommended.    Patient verbalized understanding and wanted to proceed with the planned procedure.    After informed consent was provided and allergies verified, the patient was positioned appropriately on the  bed.  The right hip to be aspirated and injected was prepped and draped in a sterile fashion.  The skin was anesthetized with ethyl chloride spray.  A joint aspiration was to be performed an 18-gauge needle was used otherwise a 22-gauge needle was used to inject the appropriate joint.      Joint injection was performed with a mixture of 5 cc 1% lidocaine plain and 2 cc Celestone Soluspan 6 mg per mL.  The needle was removed and the puncture site closed and sealed with a Band-Aid.  The patient tolerated the procedure well.    Diagnostic studies: MRI does show trochanteric bursitis some partial abductor muscle tear there is very minimal arthritis there is also some hamstring tendinosis on the right side    Impression: Right trochanteric bursitis with some partial abductor muscle tearing    Plan: Nonsurgical management    In the lateral position she was reinjected    Dedicated PT therapy weight loss program Trendelenburg gait weakness will get her a cane and she should use the cane in the left upper extremity    I will see her back in 6 to 8 weeks to see how she is doing with her rehab    Patient ID: Aaron Gilbert is a 69 y.o. female.    L Inj/Asp: R greater trochanteric bursa on 2/13/2025 8:35 AM  Indications: pain and diagnostic evaluation  Details: 25 G needle, lateral approach  Medications: 5 mL lidocaine 10 mg/mL (1 %); 2 mL betamethasone acet,sod phos 6 mg/mL  Outcome: tolerated well, no immediate complications  Procedure, treatment alternatives, risks and benefits explained, specific risks discussed. Consent was given by the patient. Immediately prior to procedure a time out  was called to verify the correct patient, procedure, equipment, support staff and site/side marked as required. Patient was prepped and draped in the usual sterile fashion.

## 2025-02-19 ENCOUNTER — APPOINTMENT (OUTPATIENT)
Dept: NEUROLOGY | Facility: CLINIC | Age: 70
End: 2025-02-19
Payer: MEDICARE

## 2025-02-26 DIAGNOSIS — K58.1 IRRITABLE BOWEL SYNDROME WITH PREDOMINANT CONSTIPATION: ICD-10-CM

## 2025-02-26 DIAGNOSIS — K21.9 GASTROESOPHAGEAL REFLUX DISEASE WITHOUT ESOPHAGITIS: Primary | ICD-10-CM

## 2025-02-26 DIAGNOSIS — F32.A DEPRESSION, UNSPECIFIED DEPRESSION TYPE: ICD-10-CM

## 2025-02-26 RX ORDER — PANTOPRAZOLE SODIUM 40 MG/1
40 TABLET, DELAYED RELEASE ORAL
Qty: 30 TABLET | Refills: 5 | Status: SHIPPED | OUTPATIENT
Start: 2025-02-26

## 2025-02-26 RX ORDER — FLUOXETINE 10 MG/1
10 CAPSULE ORAL DAILY
Qty: 30 CAPSULE | Refills: 5 | Status: SHIPPED | OUTPATIENT
Start: 2025-02-26

## 2025-02-26 NOTE — TELEPHONE ENCOUNTER
Rx Refill Request     Name: Aaron Gilbert  :  1955     Date of last appointment:  2024  Date of next appointment:  3/26/25  Best number to reach patient:  [phone]

## 2025-02-26 NOTE — TELEPHONE ENCOUNTER
Rx Refill Request     Name: Aaron Gilbert  :  1955     Date of last appointment:  2024   Date of next appointment:  3/26/2025   Best number to reach patient:  [phone]

## 2025-03-04 ENCOUNTER — APPOINTMENT (OUTPATIENT)
Dept: ALLERGY | Facility: CLINIC | Age: 70
End: 2025-03-04
Payer: MEDICARE

## 2025-03-07 ENCOUNTER — APPOINTMENT (OUTPATIENT)
Dept: PHYSICAL THERAPY | Facility: CLINIC | Age: 70
End: 2025-03-07
Payer: MEDICARE

## 2025-03-12 DIAGNOSIS — F32.A DEPRESSION, UNSPECIFIED DEPRESSION TYPE: ICD-10-CM

## 2025-03-12 RX ORDER — FLUOXETINE HYDROCHLORIDE 20 MG/1
20 CAPSULE ORAL DAILY
Qty: 90 CAPSULE | Refills: 1 | Status: SHIPPED | OUTPATIENT
Start: 2025-03-12

## 2025-03-12 NOTE — TELEPHONE ENCOUNTER
Rx Refill Request     Name: Aaron Gilbert  :  1955     Date of last appointment:  2024   Date of next appointment:  3/26/2025   Best number to reach patient:  [phone]

## 2025-03-13 ENCOUNTER — EVALUATION (OUTPATIENT)
Dept: PHYSICAL THERAPY | Facility: CLINIC | Age: 70
End: 2025-03-13
Payer: MEDICARE

## 2025-03-13 DIAGNOSIS — M50.30 OTHER CERVICAL DISC DEGENERATION, UNSPECIFIED CERVICAL REGION: ICD-10-CM

## 2025-03-13 DIAGNOSIS — M54.2 CERVICALGIA: ICD-10-CM

## 2025-03-13 DIAGNOSIS — M48.062 SPINAL STENOSIS, LUMBAR REGION WITH NEUROGENIC CLAUDICATION: ICD-10-CM

## 2025-03-13 DIAGNOSIS — M96.1 POSTLAMINECTOMY SYNDROME, NOT ELSEWHERE CLASSIFIED: ICD-10-CM

## 2025-03-13 PROCEDURE — 97110 THERAPEUTIC EXERCISES: CPT | Mod: GP | Performed by: PHYSICAL THERAPIST

## 2025-03-13 PROCEDURE — 97535 SELF CARE MNGMENT TRAINING: CPT | Mod: GP | Performed by: PHYSICAL THERAPIST

## 2025-03-13 PROCEDURE — 97161 PT EVAL LOW COMPLEX 20 MIN: CPT | Mod: GP | Performed by: PHYSICAL THERAPIST

## 2025-03-13 ASSESSMENT — PAIN SCALES - GENERAL: PAINLEVEL_OUTOF10: 5 - MODERATE PAIN

## 2025-03-13 ASSESSMENT — PAIN DESCRIPTION - DESCRIPTORS: DESCRIPTORS: ACHING;SHARP

## 2025-03-13 ASSESSMENT — PAIN - FUNCTIONAL ASSESSMENT: PAIN_FUNCTIONAL_ASSESSMENT: 0-10

## 2025-03-13 NOTE — PROGRESS NOTES
PT Initial Evaluation    Patient Name:  Aaron Gilbert    MRN:  [id number]    :  1955    Today's Date:  25    Time Calculation  Start Time: 830  Stop Time: 910  Time Calculation (min): 40 min  PT Evaluation Time Entry  PT Evaluation (Low) Time Entry: 15  PT Therapeutic Procedures Time Entry  Therapeutic Exercise Time Entry: 8  Self-Care/Home Mgmt Training: 15       Informed Consent  Patient has been informed of all evaluation findings and treatment plans and agrees to participate in Physical Therapy services and plans as outlined.    Diagnosis:  Diagnosis and Precautions: M54.2 (ICD-10-CM) - Cervicalgia  M50.30 (ICD-10-CM) - Other cervical disc degeneration, unspecified cervical region  M48.062 (ICD-10-CM) - Spinal stenosis, lumbar region with neurogenic claudication  M96.1 (ICD-10-CM) - Postlaminectomy syndrome, not elsewhere classified    Goals:   1. Pain Management  Goal: Within 2-3  weeks, the patient will report a decrease in neck/lower back pain from 5/10 to 2-3/10 or less during daily activities (e.g., turning head while driving, using a phone), as measured by a pain rating scale.    2. Strength and Endurance  Goal: Within 6-7 weeks, the patient’s deep cervical flexor endurance will improve (e.g., able to hold a chin tuck for 20 seconds without loss of form or pain above 2/10), promoting better head and neck stability during upright activities.    3. Neuromuscular Control, Proprioception, and Coordination  Goal: Within 4 weeks, the patient will demonstrate improved cervical proprioception by accurately repositioning the head within 2° of neutral (as measured by a laser pointer or CROM device) and maintaining a neutral spine during functional tasks.    4. Functional Activities / ADLs  Goal: Within 4 weeks, the patient will independently perform daily tasks (e.g., reading, computer work, household chores) for 30-minute intervals without increasing neck/lower back pain above 2/10 or adopting faulty  postures (e.g., forward head).    5. Posture and Ergonomics  Goal: Within 3 weeks, the patient will implement recommended ergonomic adjustments (e.g., proper monitor height, lumbar support) and maintain neutral cervical alignment at least 80% of the workday, as monitored by periodic self-checks or therapist observation.    6. Education, Self-Management, and Compliance  Goal: By the second therapy visit, the patient will demonstrate understanding of home exercise program (HEP) for cervical/lumbar mobility and posture correction, accurately perform each exercise with proper technique, and document compliance in a daily log.    7. Palpation and Tissue Healing  Goal: Within 2 weeks, the patient will demonstrate reduced tenderness to palpation over the upper trapezius and cervical paraspinals from 5/10 to 2/10, indicating decreased muscle tension and inflammation.    8. Long-Term Maintenance and Prevention  Goal: By discharge (approximately 7 weeks), the patient will establish a regular cervical/lumbar spine exercise routine (e.g., daily stretches, postural breaks, strength exercises) with no significant pain flare-ups (>3/10) during normal daily or recreational activities.     Plan of Care:      Treatment/Interventions: Cryotherapy, Dry needling, Education/ Instruction, Electrical stimulation, Gait training, Manual therapy, Neuromuscular re-education, Mechanical traction, Self care/ home management, Therapeutic activities, Therapeutic exercises, Ultrasound  PT Plan: Skilled PT  PT Frequency: 1 time per week  Duration: 7 visits     Certification Period Start Date: 03/13/25  Certification Period End Date: 06/11/25  Number of Treatments Authorized: 7  Rehab Potential: Good  Plan of Care Agreement: Patient    PT Assessment:    Patient is a 70 y.o. FEMALE with c/o neck/lower back pain.   Patient is alert and oriented x 3.  Patient presents with medical diagnosis of M54.2 (ICD-10-CM) - Cervicalgia M50.30 (ICD-10-CM) - Other  cervical disc degeneration, unspecified cervical region M48.062 (ICD-10-CM) - Spinal stenosis, lumbar region with neurogenic claudication M96.1 (ICD-10-CM) - Postlaminectomy syndrome, not elsewhere classified  contributing to compensatory soft tissue dysfunction, pain, stiffness and weakness of the neck/lower back.   Significant past medical history/past surgical history includes see below.    Skilled care is needed to progress the patient back to these activities without exacerbating symptoms.   Patient requires skilled PT services to address the problems identified and the individualized patient's goals as outlined in the problems and goals section of this evaluation.  A skilled PT is required to address these key impairments and to provide and progress with an appropriate home exercise program. Patient does have any significant PMH influencing Rx and reports motivation to return to FUNCTIONAL ACTIVITY.   Patient demonstrates to be a good candidate for physical therapy with good rehab potential and verbalized a good understanding of HER diagnosis, prognosis and treatment.  Goals have been established and reviewed with the patient.      PT Assessment Results: Decreased strength, Decreased range of motion, Decreased endurance, Decreased mobility, Pain  Rehab Prognosis: Good  Evaluation/Treatment Tolerance: Patient tolerated treatment well    Complexity:  Low complexity evaluation  due to a 15 minute duration, a past medical history WITH any personal factors and/or comorbidities that could impact the POC, examination of body systems completed on one to two elements, the patient presents with a stable condition, and clinical decision making using the NDI/COLEEN were of low complexity.     Prognosis:  Rehab Prognosis: Good    Problem List  Activity Limitations, Decreased Functional Level, Decreased knowledge of HEP, Flexibility, Gait issues, Pain, Range of Motion/joint mobility issues, Strength, and  Endurance    Impairments   IMPAIRED GAIT, IMPAIRED CORE STABILITY, INCREASED PAIN, IMPAIRED FUNCTIONAL ACTIVITY LEVEL, and IMPAIRED FUNCTIONAL MOVEMENT PATTERNS    Functional Limitations:  LIMITATIONS PERFORMING BASIC ADL'S, ISSUES WITH SLEEP, PARTICIPATION IN HOBBIES, PARTICIPATION IN LEISURE ACTIVITIES, and PARTICIPATION IN HOME MANAGEMENT    General Visit Information:  Reason for Referral: PT Evaluation  Referred By: Debi Bradley PA-C  General Comment: M54.2 (ICD-10-CM) - Cervicalgia  M50.30 (ICD-10-CM) - Other cervical disc degeneration, unspecified cervical region  M48.062 (ICD-10-CM) - Spinal stenosis, lumbar region with neurogenic claudication  M96.1 (ICD-10-CM) - Postlaminectomy syndrome, not elsewhere classified    Pre-Cautions:  LIAM Fall Risk Score (The score of 4 or more indicates an increased risk of falling): 0     Medical Precautions:  (HTN, Back surgery 30 years ago, OA, headaches, anxiety, hip pain))     Reason for Visit:  PT Evaluate and Treat    Initial Evaluation:  Referred By: Debi Bradley PA-C    Insurance  Insurance reviewed  Name of Insurance:  Medicare  Visit No.  1  * (EVAL) CERVICALGIA M54.2; CERVICAL DISC DEGENERATION M50.30; SPINAL STENOSIS LUMBAR WITH NEUROGENIC CLAUDICATION M48.062; POSTLAMINECTOMY SYNDROME M96.1; MEDICARE AB / [employer] [place]: 20% COINSUR // 257 DED // UNLIMITED V BMN // KX MOD AFTER 20TH V // 0 OOP // [employer] [place] IS SEC TO MEDICARE AND WILL COV PART B COINSUR AFTER DED IS MET [id number]     Subjective:    Current Episode  Date of Onset:  20 years ago  Mechanism of injury:  arthritic changes of the neck/lower back over the years  Chief Complaint:  Neck pain, lower back pain (denies any radicular pain UE/LE)  Progression of symptoms:  same over time    Pain Score:  Pain Assessment: 0-10  Pain Assessment  Pain Assessment: 0-10  0-10 (Numeric) Pain Score: 5 - Moderate pain (8/10 worst, 5/10 best)  Pain Type: Chronic pain  Pain Location: Neck  Pain Orientation:  Right, Left  Pain Descriptors: Aching, Sharp  Pain Frequency: Constant/continuous  Pain Onset: Ongoing  Pain Type: Chronic pain  Pain Location: Neck  Pain Orientation: Right, Left  Pain Descriptors: Aching, Sharp  Pain Frequency: Constant/continuous    Better with:  none    Worse with:  looking up, SB neck to the R, prolonged standing, lifting/pushing/pulling/carrying    Medical History/Surgical History:  Medical Precautions:  (HTN, Back surgery 30 years ago, OA, headaches, anxiety, hip pain)    Reviewed medical history form with patient (medications/allergies reviewed with patient).  Current Outpatient Medications   Medication Instructions    busPIRone (BUSPAR) 15 mg, oral, 3 times daily    celecoxib (CELEBREX) 100 mg, 2 times daily    cyclobenzaprine (FLEXERIL) 10 mg, oral, Nightly    estradiol (Estrace) 0.01 % (0.1 mg/gram) vaginal cream APPLY A PEA-SIZED AMOUNT DAILY    FLUoxetine (PROZAC) 10 mg, oral, Daily    FLUoxetine (PROZAC) 20 mg, oral, Daily    fluticasone (Flonase) 50 mcg/actuation nasal spray 2 sprays, Each Nostril, Daily, Shake gently. Before first use, prime pump. After use, clean tip and replace cap. Use one hour before bedtime.    indapamide (LOZOL) 1.25 mg, oral, Every morning    levocetirizine (Xyzal) 5 mg tablet Take by mouth.    montelukast (SINGULAIR) 10 mg, oral, Daily    pantoprazole (PROTONIX) 40 mg, oral, Daily before breakfast    sucralfate (CARAFATE) 1 g, oral, 3 times daily before meals    valsartan (DIOVAN) 160 mg, oral, Daily     Radiology:    Exam Information    Status Exam Begun Exam Ended   Final 12/30/2024 08:49 12/30/2024 08:54     Study Result    Narrative & Impression   Interpreted By:  Ashly Duran,   STUDY:  XR CERVICAL SPINE 2-3 VIEWS 12/30/2024 8:54 am      INDICATION:  Signs/Symptoms:CERVICAL PAIN      COMPARISON:  None available.      ACCESSION NUMBER(S):  [id number]      ORDERING CLINICIAN:  YECENIA GENTILE      TECHNIQUE:  AP and lateral views of the cervical spine       FINDINGS:  Normal mineralization. There is straightening of cervical lordosis  which may be due to muscle spasm or patient positioning. Moderate  disc space narrowing is seen from the C4-5 through the C6-7 levels  with moderate-sized anterior osteophytes arising from the endplates  from the C3-4 through the C6-7 levels. There is uncovertebral joint  spurring on the right from C4-5 through C6-7 and to a lesser extent  on the left at C5-6 and C6-7 levels.      No fracture, bony destruction, or precervical soft tissue swelling is  seen.      IMPRESSION:  Loss of cervical lordosis perhaps from muscle spasm or patient  positioning.      Degenerative disc disease from C4-5 through C6-7 with moderate  cervical spondylosis. Uncovertebral joint spurring as outlined above.     Exam Information    Status Exam Begun Exam Ended   Final 7/25/2024 11:11 7/25/2024 12:01     Study Result    Narrative & Impression   Interpreted By:  Doron Larsen,   STUDY:  MR LUMBAR SPINE W AND WO IV CONTRAST; ;  7/25/2024 12:01 pm      INDICATION:  Signs/Symptoms:back and right leg pain.      COMPARISON:  Plain film examination of 06/26/2024      ACCESSION NUMBER(S):  [id number]      ORDERING CLINICIAN:  GAYLE DORAN      TECHNIQUE:  Multiplanar and multisequential MR images of the lumbar spine are  performed. The study is supplemented with post gadolinium T1 weighted  sequences obtained in the sagittal and axial plane. 16 cc of  intravenous Dotarem is utilized.      FINDINGS:  For the purposes of communication I will refer to 5 lumbar type  vertebral bodies with sacralization of L5. The last rib vertebra will  be labeled T12.      There is levo convexity of the mid upper lumbar spine and dextro  convexity of the lower lumbar spine. There is mild posterior  subluxation of L4 relative to L5 measuring 4 mm there is anterior  subluxation of L3 relative to L4 measuring 5 mm.      There are multilevel discogenic degenerative changes of the  lumbar  spine with disc desiccation disc space narrowing, more pronounced at  L4-5.      The lumbar vertebral body heights are maintained. There is multilevel  degenerative endplate signal abnormality. There is multilevel  Schmorl's node deformities greater at the superior endplate of L2.  Scattered areas of increased signal are identified suspicious for fat  deposits and vertebral hemangiomas. This finding is greatest at L2 on  the right extending into the right pedicle a nonspecific round focus  of increased signal is identified in the left L2 transverse process  and could also reflect hemangioma.      Axial images are performed from the level of the T10-11 disc space  through S1 there is a large incompletely visualized cyst at the lower  pole the left kidney which is also noted on the localizing coronal  image. This finding measures at least 5.6 cm in diameter. Small  parapelvic cysts at the upper pole can not be excluded.      The T10-11 disc space level is incompletely visualized. There is no  significant central canal or neural foraminal stenosis.      The T11-12 disc space level demonstrates moderate to severe facet  arthrosis on the left and mild facet arthrosis on the right. There is  some effacement of the left posterolateral thecal sac with mild  central canal narrowing. There is mild narrowing of the left neural  foramen.      The T12-L1 disc space level demonstrates moderate facet arthrosis on  the right and mild facet arthrosis on the left with mild ligamentum  flavum hypertrophy. There is mild bulging disc with flattening of the  anterior thecal sac. There is mass effect upon the posterolateral  thecal sac, greater on the right. These findings contribute to at  least mild central canal narrowing. There is disc encroachment with  moderate narrowing at the caudal right neural foramen.      The L1-2 disc space level demonstrates moderate facet arthrosis on  the right and mild facet arthrosis on the left  with mild to moderate  ligamentum flavum hypertrophy. There is mild circumferential bulging  disc with flattening of the anterior thecal sac and mild to moderate  central canal narrowing. There is disc and osteophyte encroachment  with moderate narrowing at the caudal right neural foramen and mild  narrowing on the left.      The L2-3 disc space level demonstrates moderate to severe bilateral  facet arthrosis and moderate ligamentum flavum hypertrophy. There is  mild bulging disc with flattening of the anterior thecal sac and mild  to moderate central canal narrowing. There is disc encroachment with  moderate bilateral neural foraminal narrowing.      The L3-4 disc space level demonstrates severe bilateral facet  arthrosis. There may be previous partial laminectomy on the right.  There is moderate ligamentum flavum hypertrophy on the left. There is  circumferential bulging disc with mass effect upon the anterior  thecal sac and moderate central canal narrowing. There is disc  encroachment with mild narrowing at the caudal left neural foramen  and moderate to severe narrowing of the right neural foramen.      The L4-5 disc space level demonstrates moderate bilateral facet  arthrosis.. There may be partial laminectomy on the right. There is  moderate ligamentum flavum hypertrophy on the left. There is no  significant central canal narrowing. There is moderate narrowing of  the left neural foramen and minimal narrowing of the right neural  foramen.      IMPRESSION:  Please make note of the numbering system described above.      Multilevel discogenic degenerative changes lumbar spine with  degenerative scoliosis and degenerative posterior subluxation of L4.      Multilevel bulging disc with hypertrophic facet arthrosis and  ligamentum flavum hypertrophy. There is multilevel central canal  narrowing secondary to bulging disc and ligamentum flavum  hypertrophy. There is mild to moderate central canal narrowing at  L1-2  and L2-3 and moderate central canal narrowing at L3-4. There is  bilateral multilevel neural foraminal narrowing as detailed above.  Right-sided neural foraminal narrowing is greatest at L1-2 and L4-5.      No acute osseous abnormality.      Incidental left renal cysts.     For lower back/neck only:  Previous PT:  YES  Previous chiropractic:  NO  Previous acupuncture:  NO  Previous pain management:  YES  Lower back surgery:  YES  Neck surgery:  NO    Functional Assessment:  Level of [place]:  Level of [place]: Independent with ADLs and functional transfers    Social History:    Work Status:  UNEMPLOYED  Patient Awareness:  Patient is aware of HER diagnosis and prognosis.  Social Support/History:  LIVES WITH FAMILY    Objective:    Weightbearing Status:  FWB    Skin:  skin intact over neck, lumbar surgical incision healed and closed.    Palpation:   point tenderness over bilateral upper trapezius, levator scapula, scalenes    Sensation:  Patient denies numbness/tingling of bilateral UPPER and LOWER extremities.    Gait:  decreased step length, decreased pelvic rotation bilaterally    ROM:  AROM  Cervical spine flexion end range pain  Extension end range pain  Rotation WNL's R and L  Lumbar AROM  Flexion WNL's  Extension end range pain  Rotation WNL's R and L  SB end range pain R and L  R knee AROM WNL's, L knee AROM WNL's, R hip AROM WNL's, L hip AROM WNL's, R ankle/foot AROM WNL's, and L ankle/foot AROM WNL's    Strength:    Bilateral hips 4/5 all planes  R knee 5/5 all planes, L knee 5/5 all planes, R ankle/foot 5/5 all planes, L ankle/foot 5/5 all planes, R shoulder 5/5 all planes, L shoulder 5/5 all planes, R elbow 5/5 all planes, and L elbow 5/5 all planes    Special Tests:  negative SLR R and L, no appreciable leg length difference    Joint Mobility:  mild stiffness thoracic spine with prone PA glides    Outcome measure     Other Measures  Neck Disability Index: 32%  Oswestry Disablity Index (COLEEN):  28%     Treatment  Time in clinic started at  8:30am  Time in clinic ended at  9:10am  Total time in clinic is . 40 minutes  Total timed code time is  38 minutes    Treatment Performed Today:.   PT Initial Evaluation, Therapeutic Exercise, and Self-Care/Home Management HEP  Individual(s) Educated: Patient  Education Provided: Home Exercise Program  Diagnosis and Precautions: M54.2 (ICD-10-CM) - Cervicalgia  M50.30 (ICD-10-CM) - Other cervical disc degeneration, unspecified cervical region  M48.062 (ICD-10-CM) - Spinal stenosis, lumbar region with neurogenic claudication  M96.1 (ICD-10-CM) - Postlaminectomy syndrome, not elsewhere classified  Risk and Benefits Discussed with Patient/Caregiver/Other: yes  Patient/Caregiver Demonstrated Understanding: yes  Plan of Care Discussed and Agreed Upon: yes  Patient Response to Education: Patient/Caregiver Verbalized Understanding of Information, Patient/Caregiver Performed Return Demonstration of Exercises/Activities, Patient/Caregiver Asked Appropriate Questions    Patient instructed in a home exercise program, has been given handouts for each of the exercises performed and was given another sheet instructing patient in the amount of reps to perform and the martina to follow while doing the exercises     Access Code: [email or web address]  URL: [email or web address]  Date: 03/13/2025  Prepared by: Scott Bergman    Exercises  - Supine Lower Trunk Rotation  - 1 x daily - 3-4 x weekly - 1 sets - 10 reps - 10 hold  - Supine Single Knee to Chest Stretch  - 1 x daily - 3-4 x weekly - 1 sets - 5 reps - 30 hold  - Supine Double Knee to Chest  - 1 x daily - 3-4 x weekly - 1 sets - 5 reps - 30 hold  - Supine Bridge  - 1 x daily - 3-4 x weekly - 2 sets - 10 reps - 5-10 hold  - Supine Posterior Pelvic Tilt  - 1 x daily - 3-4 x weekly - 2 sets - 10 reps - 5-10 hold  - Supine March  - 1 x daily - 3-4 x weekly - 2 sets - 10 reps - 5-10 hold  - Hooklying Heel Walk  - 1 x daily -  3-4 x weekly - 2 sets - 10 reps - 5-10 hold  - Bilateral Bent Leg Lift  - 1 x daily - 3-4 x weekly - 2 sets - 10 reps - 5-10 hold  - Seated Levator Scapulae Stretch  - 1 x daily - 4-5 x weekly - 1 sets - 5 reps - 30 hold  - Seated Upper Trapezius Stretch  - 1 x daily - 4-5 x weekly - 1 sets - 5 reps - 30 hold  - Seated Scalene Stretch with Towel  - 1 x daily - 4-5 x weekly - 1 sets - 5 reps - 30 hold  - Supine Chin Tuck  - 1 x daily - 3-4 x weekly - 2 sets - 10 reps - 5  hold  - Supine Deep Neck Flexor Training - Repetitions  - 1 x daily - 3-4 x weekly - 1 sets - 10 reps - 10 hold

## 2025-03-13 NOTE — Clinical Note
March 13, 2025    Scott Bergman, PT  [address] [facility], [address] [place] [address]    Patient: Aaron Gilbert   YOB: 1955   Date of Visit: 3/13/2025       Dear Debi Bradley PA-C  [address] [facility] [place] [address]    The attached plan of care is being sent to you because your patient’s medical reimbursement requires that you certify the plan of care. Your signature is required to allow uninterrupted insurance coverage.      You may indicate your approval by signing below and faxing this form back to us at Dept Fax: [phone].    Please call Dept: [phone] with any questions or concerns.    Thank you for this referral,        Scott Bergman, PT  [place] [address] [facility]  [address] [facility] [address]    Payer: Payor: MEDICARE / Plan: MEDICARE PART A AND B / Product Type: *No Product type* /                                                                         Date:     Dear Scott Bergman, PT,     Re: Ms. Aaron Gilbert, MRN:[id number]    I certify that I have reviewed the attached plan of care and it is medically necessary for Ms. Aaron Gilbert (1955) who is under my care.          ______________________________________                    _________________  Provider name and credentials                                           Date and time                                                                                           Plan of Care 3/13/25   Effective from: 3/13/2025  Effective to: 6/11/2025    Plan ID: [id number]            Participants as of Finalize on 3/13/2025    Name Type Comments Contact Info    Debi Bradley PA-C Referring Provider  [phone]    Scott Bergman, PT Physical Therapist  [phone]       Last Plan Note     Author: Scott Bergman PT Status: Incomplete Last edited: 3/13/2025  8:30 AM       PT Initial Evaluation    Patient Name:  Aaron Gilbert    MRN:   [id number]    :  1955    Today's Date:  25    Time Calculation  Start Time: 830  Stop Time: 910  Time Calculation (min): 40 min  PT Evaluation Time Entry  PT Evaluation (Low) Time Entry: 15  PT Therapeutic Procedures Time Entry  Therapeutic Exercise Time Entry: 8  Self-Care/Home Mgmt Training: 15       Informed Consent  Patient has been informed of all evaluation findings and treatment plans and agrees to participate in Physical Therapy services and plans as outlined.    Diagnosis:  Diagnosis and Precautions: M54.2 (ICD-10-CM) - Cervicalgia  M50.30 (ICD-10-CM) - Other cervical disc degeneration, unspecified cervical region  M48.062 (ICD-10-CM) - Spinal stenosis, lumbar region with neurogenic claudication  M96.1 (ICD-10-CM) - Postlaminectomy syndrome, not elsewhere classified    Goals:   1. Pain Management  Goal: Within 2-3  weeks, the patient will report a decrease in neck/lower back pain from 5/10 to 2-3/10 or less during daily activities (e.g., turning head while driving, using a phone), as measured by a pain rating scale.    2. Strength and Endurance  Goal: Within 6-7 weeks, the patient’s deep cervical flexor endurance will improve (e.g., able to hold a chin tuck for 20 seconds without loss of form or pain above 2/10), promoting better head and neck stability during upright activities.    3. Neuromuscular Control, Proprioception, and Coordination  Goal: Within 4 weeks, the patient will demonstrate improved cervical proprioception by accurately repositioning the head within 2° of neutral (as measured by a laser pointer or CROM device) and maintaining a neutral spine during functional tasks.    4. Functional Activities / ADLs  Goal: Within 4 weeks, the patient will independently perform daily tasks (e.g., reading, computer work, household chores) for 30-minute intervals without increasing neck/lower back pain above 2/10 or adopting faulty postures (e.g., forward head).    5. Posture and  Ergonomics  Goal: Within 3 weeks, the patient will implement recommended ergonomic adjustments (e.g., proper monitor height, lumbar support) and maintain neutral cervical alignment at least 80% of the workday, as monitored by periodic self-checks or therapist observation.    6. Education, Self-Management, and Compliance  Goal: By the second therapy visit, the patient will demonstrate understanding of home exercise program (HEP) for cervical/lumbar mobility and posture correction, accurately perform each exercise with proper technique, and document compliance in a daily log.    7. Palpation and Tissue Healing  Goal: Within 2 weeks, the patient will demonstrate reduced tenderness to palpation over the upper trapezius and cervical paraspinals from 5/10 to 2/10, indicating decreased muscle tension and inflammation.    8. Long-Term Maintenance and Prevention  Goal: By discharge (approximately 7 weeks), the patient will establish a regular cervical/lumbar spine exercise routine (e.g., daily stretches, postural breaks, strength exercises) with no significant pain flare-ups (>3/10) during normal daily or recreational activities.     Plan of Care:      Treatment/Interventions: Cryotherapy, Dry needling, Education/ Instruction, Electrical stimulation, Gait training, Manual therapy, Neuromuscular re-education, Mechanical traction, Self care/ home management, Therapeutic activities, Therapeutic exercises, Ultrasound  PT Plan: Skilled PT  PT Frequency: 1 time per week  Duration: 7 visits     Certification Period Start Date: 03/13/25  Certification Period End Date: 06/11/25  Number of Treatments Authorized: 7  Rehab Potential: Good  Plan of Care Agreement: Patient    PT Assessment:    Patient is a 70 y.o. FEMALE with c/o neck/lower back pain.   Patient is alert and oriented x 3.  Patient presents with medical diagnosis of M54.2 (ICD-10-CM) - Cervicalgia M50.30 (ICD-10-CM) - Other cervical disc degeneration, unspecified cervical  region M48.062 (ICD-10-CM) - Spinal stenosis, lumbar region with neurogenic claudication M96.1 (ICD-10-CM) - Postlaminectomy syndrome, not elsewhere classified  contributing to compensatory soft tissue dysfunction, pain, stiffness and weakness of the neck/lower back.   Significant past medical history/past surgical history includes see below.    Skilled care is needed to progress the patient back to these activities without exacerbating symptoms.   Patient requires skilled PT services to address the problems identified and the individualized patient's goals as outlined in the problems and goals section of this evaluation.  A skilled PT is required to address these key impairments and to provide and progress with an appropriate home exercise program. Patient does have any significant PMH influencing Rx and reports motivation to return to FUNCTIONAL ACTIVITY.   Patient demonstrates to be a good candidate for physical therapy with good rehab potential and verbalized a good understanding of HER diagnosis, prognosis and treatment.  Goals have been established and reviewed with the patient.      PT Assessment Results: Decreased strength, Decreased range of motion, Decreased endurance, Decreased mobility, Pain  Rehab Prognosis: Good  Evaluation/Treatment Tolerance: Patient tolerated treatment well    Complexity:  Low complexity evaluation  due to a 15 minute duration, a past medical history WITH any personal factors and/or comorbidities that could impact the POC, examination of body systems completed on one to two elements, the patient presents with a stable condition, and clinical decision making using the NDI/COLEEN were of low complexity.     Prognosis:  Rehab Prognosis: Good    Problem List  Activity Limitations, Decreased Functional Level, Decreased knowledge of HEP, Flexibility, Gait issues, Pain, Range of Motion/joint mobility issues, Strength, and Endurance    Impairments   IMPAIRED GAIT, IMPAIRED CORE STABILITY,  INCREASED PAIN, IMPAIRED FUNCTIONAL ACTIVITY LEVEL, and IMPAIRED FUNCTIONAL MOVEMENT PATTERNS    Functional Limitations:  LIMITATIONS PERFORMING BASIC ADL'S, ISSUES WITH SLEEP, PARTICIPATION IN HOBBIES, PARTICIPATION IN LEISURE ACTIVITIES, and PARTICIPATION IN HOME MANAGEMENT    General Visit Information:  Reason for Referral: PT Evaluation  Referred By: Debi Bradley PA-C  General Comment: M54.2 (ICD-10-CM) - Cervicalgia  M50.30 (ICD-10-CM) - Other cervical disc degeneration, unspecified cervical region  M48.062 (ICD-10-CM) - Spinal stenosis, lumbar region with neurogenic claudication  M96.1 (ICD-10-CM) - Postlaminectomy syndrome, not elsewhere classified    Pre-Cautions:  LIAM Fall Risk Score (The score of 4 or more indicates an increased risk of falling): 0     Medical Precautions:  (HTN, Back surgery 30 years ago, OA, headaches, anxiety, hip pain))     Reason for Visit:  PT Evaluate and Treat    Initial Evaluation:  Referred By: Debi Bradley PA-C    Insurance  Insurance reviewed  Name of Insurance:  Medicare  Visit No.  1  * (EVAL) CERVICALGIA M54.2; CERVICAL DISC DEGENERATION M50.30; SPINAL STENOSIS LUMBAR WITH NEUROGENIC CLAUDICATION M48.062; POSTLAMINECTOMY SYNDROME M96.1; MEDICARE AB / [employer] [place]: 20% COINSUR // 257 DED // UNLIMITED V BMN // KX MOD AFTER 20TH V // 0 OOP // [employer] [place] IS SEC TO MEDICARE AND WILL COV PART B COINSUR AFTER DED IS MET [id number]     Subjective:    Current Episode  Date of Onset:  20 years ago  Mechanism of injury:  arthritic changes of the neck/lower back over the years  Chief Complaint:  Neck pain, lower back pain (denies any radicular pain UE/LE)  Progression of symptoms:  same over time    Pain Score:  Pain Assessment: 0-10  Pain Assessment  Pain Assessment: 0-10  0-10 (Numeric) Pain Score: 5 - Moderate pain (8/10 worst, 5/10 best)  Pain Type: Chronic pain  Pain Location: Neck  Pain Orientation: Right, Left  Pain Descriptors: Aching, Sharp  Pain Frequency:  Constant/continuous  Pain Onset: Ongoing  Pain Type: Chronic pain  Pain Location: Neck  Pain Orientation: Right, Left  Pain Descriptors: Aching, Sharp  Pain Frequency: Constant/continuous    Better with:  none    Worse with:  looking up, SB neck to the R, prolonged standing, lifting/pushing/pulling/carrying    Medical History/Surgical History:  Medical Precautions:  (HTN, Back surgery 30 years ago, OA, headaches, anxiety, hip pain)    Reviewed medical history form with patient (medications/allergies reviewed with patient).  Current Outpatient Medications   Medication Instructions   • busPIRone (BUSPAR) 15 mg, oral, 3 times daily   • celecoxib (CELEBREX) 100 mg, 2 times daily   • cyclobenzaprine (FLEXERIL) 10 mg, oral, Nightly   • estradiol (Estrace) 0.01 % (0.1 mg/gram) vaginal cream APPLY A PEA-SIZED AMOUNT DAILY   • FLUoxetine (PROZAC) 10 mg, oral, Daily   • FLUoxetine (PROZAC) 20 mg, oral, Daily   • fluticasone (Flonase) 50 mcg/actuation nasal spray 2 sprays, Each Nostril, Daily, Shake gently. Before first use, prime pump. After use, clean tip and replace cap. Use one hour before bedtime.   • indapamide (LOZOL) 1.25 mg, oral, Every morning   • levocetirizine (Xyzal) 5 mg tablet Take by mouth.   • montelukast (SINGULAIR) 10 mg, oral, Daily   • pantoprazole (PROTONIX) 40 mg, oral, Daily before breakfast   • sucralfate (CARAFATE) 1 g, oral, 3 times daily before meals   • valsartan (DIOVAN) 160 mg, oral, Daily     Radiology:    Exam Information    Status Exam Begun Exam Ended   Final 12/30/2024 08:49 12/30/2024 08:54     Study Result    Narrative & Impression   Interpreted By:  Ashly Duran,   STUDY:  XR CERVICAL SPINE 2-3 VIEWS 12/30/2024 8:54 am      INDICATION:  Signs/Symptoms:CERVICAL PAIN      COMPARISON:  None available.      ACCESSION NUMBER(S):  [id number]      ORDERING CLINICIAN:  YECENIA GENTILE      TECHNIQUE:  AP and lateral views of the cervical spine      FINDINGS:  Normal mineralization. There is  straightening of cervical lordosis  which may be due to muscle spasm or patient positioning. Moderate  disc space narrowing is seen from the C4-5 through the C6-7 levels  with moderate-sized anterior osteophytes arising from the endplates  from the C3-4 through the C6-7 levels. There is uncovertebral joint  spurring on the right from C4-5 through C6-7 and to a lesser extent  on the left at C5-6 and C6-7 levels.      No fracture, bony destruction, or precervical soft tissue swelling is  seen.      IMPRESSION:  Loss of cervical lordosis perhaps from muscle spasm or patient  positioning.      Degenerative disc disease from C4-5 through C6-7 with moderate  cervical spondylosis. Uncovertebral joint spurring as outlined above.     Exam Information    Status Exam Begun Exam Ended   Final 7/25/2024 11:11 7/25/2024 12:01     Study Result    Narrative & Impression   Interpreted By:  Doron Larsen,   STUDY:  MR LUMBAR SPINE W AND WO IV CONTRAST; ;  7/25/2024 12:01 pm      INDICATION:  Signs/Symptoms:back and right leg pain.      COMPARISON:  Plain film examination of 06/26/2024      ACCESSION NUMBER(S):  [id number]      ORDERING CLINICIAN:  GAYLE DORAN      TECHNIQUE:  Multiplanar and multisequential MR images of the lumbar spine are  performed. The study is supplemented with post gadolinium T1 weighted  sequences obtained in the sagittal and axial plane. 16 cc of  intravenous Dotarem is utilized.      FINDINGS:  For the purposes of communication I will refer to 5 lumbar type  vertebral bodies with sacralization of L5. The last rib vertebra will  be labeled T12.      There is levo convexity of the mid upper lumbar spine and dextro  convexity of the lower lumbar spine. There is mild posterior  subluxation of L4 relative to L5 measuring 4 mm there is anterior  subluxation of L3 relative to L4 measuring 5 mm.      There are multilevel discogenic degenerative changes of the lumbar  spine with disc desiccation disc space  narrowing, more pronounced at  L4-5.      The lumbar vertebral body heights are maintained. There is multilevel  degenerative endplate signal abnormality. There is multilevel  Schmorl's node deformities greater at the superior endplate of L2.  Scattered areas of increased signal are identified suspicious for fat  deposits and vertebral hemangiomas. This finding is greatest at L2 on  the right extending into the right pedicle a nonspecific round focus  of increased signal is identified in the left L2 transverse process  and could also reflect hemangioma.      Axial images are performed from the level of the T10-11 disc space  through S1 there is a large incompletely visualized cyst at the lower  pole the left kidney which is also noted on the localizing coronal  image. This finding measures at least 5.6 cm in diameter. Small  parapelvic cysts at the upper pole can not be excluded.      The T10-11 disc space level is incompletely visualized. There is no  significant central canal or neural foraminal stenosis.      The T11-12 disc space level demonstrates moderate to severe facet  arthrosis on the left and mild facet arthrosis on the right. There is  some effacement of the left posterolateral thecal sac with mild  central canal narrowing. There is mild narrowing of the left neural  foramen.      The T12-L1 disc space level demonstrates moderate facet arthrosis on  the right and mild facet arthrosis on the left with mild ligamentum  flavum hypertrophy. There is mild bulging disc with flattening of the  anterior thecal sac. There is mass effect upon the posterolateral  thecal sac, greater on the right. These findings contribute to at  least mild central canal narrowing. There is disc encroachment with  moderate narrowing at the caudal right neural foramen.      The L1-2 disc space level demonstrates moderate facet arthrosis on  the right and mild facet arthrosis on the left with mild to moderate  ligamentum flavum  hypertrophy. There is mild circumferential bulging  disc with flattening of the anterior thecal sac and mild to moderate  central canal narrowing. There is disc and osteophyte encroachment  with moderate narrowing at the caudal right neural foramen and mild  narrowing on the left.      The L2-3 disc space level demonstrates moderate to severe bilateral  facet arthrosis and moderate ligamentum flavum hypertrophy. There is  mild bulging disc with flattening of the anterior thecal sac and mild  to moderate central canal narrowing. There is disc encroachment with  moderate bilateral neural foraminal narrowing.      The L3-4 disc space level demonstrates severe bilateral facet  arthrosis. There may be previous partial laminectomy on the right.  There is moderate ligamentum flavum hypertrophy on the left. There is  circumferential bulging disc with mass effect upon the anterior  thecal sac and moderate central canal narrowing. There is disc  encroachment with mild narrowing at the caudal left neural foramen  and moderate to severe narrowing of the right neural foramen.      The L4-5 disc space level demonstrates moderate bilateral facet  arthrosis.. There may be partial laminectomy on the right. There is  moderate ligamentum flavum hypertrophy on the left. There is no  significant central canal narrowing. There is moderate narrowing of  the left neural foramen and minimal narrowing of the right neural  foramen.      IMPRESSION:  Please make note of the numbering system described above.      Multilevel discogenic degenerative changes lumbar spine with  degenerative scoliosis and degenerative posterior subluxation of L4.      Multilevel bulging disc with hypertrophic facet arthrosis and  ligamentum flavum hypertrophy. There is multilevel central canal  narrowing secondary to bulging disc and ligamentum flavum  hypertrophy. There is mild to moderate central canal narrowing at  L1-2 and L2-3 and moderate central canal  narrowing at L3-4. There is  bilateral multilevel neural foraminal narrowing as detailed above.  Right-sided neural foraminal narrowing is greatest at L1-2 and L4-5.      No acute osseous abnormality.      Incidental left renal cysts.     For lower back/neck only:  Previous PT:  YES  Previous chiropractic:  NO  Previous acupuncture:  NO  Previous pain management:  YES  Lower back surgery:  YES  Neck surgery:  NO    Functional Assessment:  Level of [place]:  Level of [place]: Independent with ADLs and functional transfers    Social History:    Work Status:  UNEMPLOYED  Patient Awareness:  Patient is aware of HER diagnosis and prognosis.  Social Support/History:  LIVES WITH FAMILY    Objective:    Weightbearing Status:  FWB    Skin:  skin intact over neck, lumbar surgical incision healed and closed.    Palpation:   point tenderness over bilateral upper trapezius, levator scapula, scalenes    Sensation:  Patient denies numbness/tingling of bilateral UPPER and LOWER extremities.    Gait:  decreased step length, decreased pelvic rotation bilaterally    ROM:  AROM  Cervical spine flexion end range pain  Extension end range pain  Rotation WNL's R and L  Lumbar AROM  Flexion WNL's  Extension end range pain  Rotation WNL's R and L  SB end range pain R and L  R knee AROM WNL's, L knee AROM WNL's, R hip AROM WNL's, L hip AROM WNL's, R ankle/foot AROM WNL's, and L ankle/foot AROM WNL's    Strength:    Bilateral hips 4/5 all planes  R knee 5/5 all planes, L knee 5/5 all planes, R ankle/foot 5/5 all planes, L ankle/foot 5/5 all planes, R shoulder 5/5 all planes, L shoulder 5/5 all planes, R elbow 5/5 all planes, and L elbow 5/5 all planes    Special Tests:  negative SLR R and L, no appreciable leg length difference    Joint Mobility:  mild stiffness thoracic spine with prone PA glides    Outcome measure     Other Measures  Neck Disability Index: 32%  Oswestry Disablity Index (COLEEN): 28%     Treatment  Time in clinic  started at  8:30am  Time in clinic ended at  9:10am  Total time in clinic is . 40 minutes  Total timed code time is  38 minutes    Treatment Performed Today:.   PT Initial Evaluation, Therapeutic Exercise, and Self-Care/Home Management HEP  Individual(s) Educated: Patient  Education Provided: Home Exercise Program  Diagnosis and Precautions: M54.2 (ICD-10-CM) - Cervicalgia  M50.30 (ICD-10-CM) - Other cervical disc degeneration, unspecified cervical region  M48.062 (ICD-10-CM) - Spinal stenosis, lumbar region with neurogenic claudication  M96.1 (ICD-10-CM) - Postlaminectomy syndrome, not elsewhere classified  Risk and Benefits Discussed with Patient/Caregiver/Other: yes  Patient/Caregiver Demonstrated Understanding: yes  Plan of Care Discussed and Agreed Upon: yes  Patient Response to Education: Patient/Caregiver Verbalized Understanding of Information, Patient/Caregiver Performed Return Demonstration of Exercises/Activities, Patient/Caregiver Asked Appropriate Questions    Patient instructed in a home exercise program, has been given handouts for each of the exercises performed and was given another sheet instructing patient in the amount of reps to perform and the martina to follow while doing the exercises     Access Code: [email or web address]  URL: [email or web address]  Date: 03/13/2025  Prepared by: Scott Bergman    Exercises  - Supine Lower Trunk Rotation  - 1 x daily - 3-4 x weekly - 1 sets - 10 reps - 10 hold  - Supine Single Knee to Chest Stretch  - 1 x daily - 3-4 x weekly - 1 sets - 5 reps - 30 hold  - Supine Double Knee to Chest  - 1 x daily - 3-4 x weekly - 1 sets - 5 reps - 30 hold  - Supine Bridge  - 1 x daily - 3-4 x weekly - 2 sets - 10 reps - 5-10 hold  - Supine Posterior Pelvic Tilt  - 1 x daily - 3-4 x weekly - 2 sets - 10 reps - 5-10 hold  - Supine March  - 1 x daily - 3-4 x weekly - 2 sets - 10 reps - 5-10 hold  - Hooklying Heel Walk  - 1 x daily - 3-4 x weekly - 2 sets - 10 reps -  5-10 hold  - Bilateral Bent Leg Lift  - 1 x daily - 3-4 x weekly - 2 sets - 10 reps - 5-10 hold  - Seated Levator Scapulae Stretch  - 1 x daily - 4-5 x weekly - 1 sets - 5 reps - 30 hold  - Seated Upper Trapezius Stretch  - 1 x daily - 4-5 x weekly - 1 sets - 5 reps - 30 hold  - Seated Scalene Stretch with Towel  - 1 x daily - 4-5 x weekly - 1 sets - 5 reps - 30 hold  - Supine Chin Tuck  - 1 x daily - 3-4 x weekly - 2 sets - 10 reps - 5  hold  - Supine Deep Neck Flexor Training - Repetitions  - 1 x daily - 3-4 x weekly - 1 sets - 10 reps - 10 hold         Current Participants as of 3/13/2025    Name Type Comments Contact Juanita Bradley PA-C Referring Provider  [phone]    Signature pending    Scott Bergman PT Physical Therapist  [phone]    Electronically signed by Scott Bergman PT at 3/13/2025 0952 EDT

## 2025-03-13 NOTE — LETTER
2025    Debi Bradley PA-C  [address] [place] [address]    Patient: Aaron Gilbert   YOB: 1955   Date of Visit: 3/13/2025       Dear Debi Bradley PA-C  [address] [place] [address]    The attached plan of care is being sent to you because your patient’s medical reimbursement requires that you certify the plan of care. Your signature is required to allow uninterrupted insurance coverage.      You may indicate your approval by signing below and faxing this form back to us at Dept Fax: [phone].    Please call Dept: [phone] with any questions or concerns.    Thank you for this referral,        Scott Bergman, PT  [place] [address] [facility]  [address] [facility] [address]    Payer: Payor: MEDICARE / Plan: MEDICARE PART A AND B / Product Type: *No Product type* /                                                                         Date:     Dear Scott Bergman, PT,     Re: Ms. Aaron Gilbert, MRN:[id number]    I certify that I have reviewed the attached plan of care and it is medically necessary for Ms. Aaron Gilbert (1955) who is under my care.          ______________________________________                    _________________  Provider name and credentials                                           Date and time                                                                                           Plan of Care 3/13/25   Effective from: 3/13/2025  Effective to: 2025    Plan ID: [id number]            Participants as of Finalize on 3/13/2025    Name Type Comments Contact Info    Debi Bradley PA-C Referring Provider  [phone]    Scott Bergman, PT Physical Therapist  [phone]       Last Plan Note     Author: Scott Bergman PT Status: Incomplete Last edited: 3/13/2025  8:30 AM       PT Initial Evaluation    Patient Name:  Aaron Gilbert    MRN:  [id number]    :  1955    Today's Date:   03/13/25    Time Calculation  Start Time: 0830  Stop Time: 0910  Time Calculation (min): 40 min  PT Evaluation Time Entry  PT Evaluation (Low) Time Entry: 15  PT Therapeutic Procedures Time Entry  Therapeutic Exercise Time Entry: 8  Self-Care/Home Mgmt Training: 15       Informed Consent  Patient has been informed of all evaluation findings and treatment plans and agrees to participate in Physical Therapy services and plans as outlined.    Diagnosis:  Diagnosis and Precautions: M54.2 (ICD-10-CM) - Cervicalgia  M50.30 (ICD-10-CM) - Other cervical disc degeneration, unspecified cervical region  M48.062 (ICD-10-CM) - Spinal stenosis, lumbar region with neurogenic claudication  M96.1 (ICD-10-CM) - Postlaminectomy syndrome, not elsewhere classified    Goals:   1. Pain Management  Goal: Within 2-3  weeks, the patient will report a decrease in neck/lower back pain from 5/10 to 2-3/10 or less during daily activities (e.g., turning head while driving, using a phone), as measured by a pain rating scale.    2. Strength and Endurance  Goal: Within 6-7 weeks, the patient’s deep cervical flexor endurance will improve (e.g., able to hold a chin tuck for 20 seconds without loss of form or pain above 2/10), promoting better head and neck stability during upright activities.    3. Neuromuscular Control, Proprioception, and Coordination  Goal: Within 4 weeks, the patient will demonstrate improved cervical proprioception by accurately repositioning the head within 2° of neutral (as measured by a laser pointer or CROM device) and maintaining a neutral spine during functional tasks.    4. Functional Activities / ADLs  Goal: Within 4 weeks, the patient will independently perform daily tasks (e.g., reading, computer work, household chores) for 30-minute intervals without increasing neck/lower back pain above 2/10 or adopting faulty postures (e.g., forward head).    5. Posture and Ergonomics  Goal: Within 3 weeks, the patient will implement  recommended ergonomic adjustments (e.g., proper monitor height, lumbar support) and maintain neutral cervical alignment at least 80% of the workday, as monitored by periodic self-checks or therapist observation.    6. Education, Self-Management, and Compliance  Goal: By the second therapy visit, the patient will demonstrate understanding of home exercise program (HEP) for cervical/lumbar mobility and posture correction, accurately perform each exercise with proper technique, and document compliance in a daily log.    7. Palpation and Tissue Healing  Goal: Within 2 weeks, the patient will demonstrate reduced tenderness to palpation over the upper trapezius and cervical paraspinals from 5/10 to 2/10, indicating decreased muscle tension and inflammation.    8. Long-Term Maintenance and Prevention  Goal: By discharge (approximately 7 weeks), the patient will establish a regular cervical/lumbar spine exercise routine (e.g., daily stretches, postural breaks, strength exercises) with no significant pain flare-ups (>3/10) during normal daily or recreational activities.     Plan of Care:      Treatment/Interventions: Cryotherapy, Dry needling, Education/ Instruction, Electrical stimulation, Gait training, Manual therapy, Neuromuscular re-education, Mechanical traction, Self care/ home management, Therapeutic activities, Therapeutic exercises, Ultrasound  PT Plan: Skilled PT  PT Frequency: 1 time per week  Duration: 7 visits     Certification Period Start Date: 03/13/25  Certification Period End Date: 06/11/25  Number of Treatments Authorized: 7  Rehab Potential: Good  Plan of Care Agreement: Patient    PT Assessment:    Patient is a 70 y.o. FEMALE with c/o neck/lower back pain.   Patient is alert and oriented x 3.  Patient presents with medical diagnosis of M54.2 (ICD-10-CM) - Cervicalgia M50.30 (ICD-10-CM) - Other cervical disc degeneration, unspecified cervical region M48.062 (ICD-10-CM) - Spinal stenosis, lumbar region  with neurogenic claudication M96.1 (ICD-10-CM) - Postlaminectomy syndrome, not elsewhere classified  contributing to compensatory soft tissue dysfunction, pain, stiffness and weakness of the neck/lower back.   Significant past medical history/past surgical history includes see below.    Skilled care is needed to progress the patient back to these activities without exacerbating symptoms.   Patient requires skilled PT services to address the problems identified and the individualized patient's goals as outlined in the problems and goals section of this evaluation.  A skilled PT is required to address these key impairments and to provide and progress with an appropriate home exercise program. Patient does have any significant PMH influencing Rx and reports motivation to return to FUNCTIONAL ACTIVITY.   Patient demonstrates to be a good candidate for physical therapy with good rehab potential and verbalized a good understanding of HER diagnosis, prognosis and treatment.  Goals have been established and reviewed with the patient.      PT Assessment Results: Decreased strength, Decreased range of motion, Decreased endurance, Decreased mobility, Pain  Rehab Prognosis: Good  Evaluation/Treatment Tolerance: Patient tolerated treatment well    Complexity:  Low complexity evaluation  due to a 15 minute duration, a past medical history WITH any personal factors and/or comorbidities that could impact the POC, examination of body systems completed on one to two elements, the patient presents with a stable condition, and clinical decision making using the NDI/COLEEN were of low complexity.     Prognosis:  Rehab Prognosis: Good    Problem List  Activity Limitations, Decreased Functional Level, Decreased knowledge of HEP, Flexibility, Gait issues, Pain, Range of Motion/joint mobility issues, Strength, and Endurance    Impairments   IMPAIRED GAIT, IMPAIRED CORE STABILITY, INCREASED PAIN, IMPAIRED FUNCTIONAL ACTIVITY LEVEL, and IMPAIRED  FUNCTIONAL MOVEMENT PATTERNS    Functional Limitations:  LIMITATIONS PERFORMING BASIC ADL'S, ISSUES WITH SLEEP, PARTICIPATION IN HOBBIES, PARTICIPATION IN LEISURE ACTIVITIES, and PARTICIPATION IN HOME MANAGEMENT    General Visit Information:  Reason for Referral: PT Evaluation  Referred By: Debi Bradley PA-C  General Comment: M54.2 (ICD-10-CM) - Cervicalgia  M50.30 (ICD-10-CM) - Other cervical disc degeneration, unspecified cervical region  M48.062 (ICD-10-CM) - Spinal stenosis, lumbar region with neurogenic claudication  M96.1 (ICD-10-CM) - Postlaminectomy syndrome, not elsewhere classified    Pre-Cautions:  LIAM Fall Risk Score (The score of 4 or more indicates an increased risk of falling): 0     Medical Precautions:  (HTN, Back surgery 30 years ago, OA, headaches, anxiety, hip pain))     Reason for Visit:  PT Evaluate and Treat    Initial Evaluation:  Referred By: Debi Bradley PA-C    Insurance  Insurance reviewed  Name of Insurance:  Medicare  Visit No.  1  * (EVAL) CERVICALGIA M54.2; CERVICAL DISC DEGENERATION M50.30; SPINAL STENOSIS LUMBAR WITH NEUROGENIC CLAUDICATION M48.062; POSTLAMINECTOMY SYNDROME M96.1; MEDICARE AB / [employer]: 20% COINSUR // 257 DED // UNLIMITED V BMN // KX MOD AFTER 20TH V // 0 OOP // [employer] IS SEC TO MEDICARE AND WILL COV PART B COINSUR AFTER DED IS MET [id number]     Subjective:    Current Episode  Date of Onset:  20 years ago  Mechanism of injury:  arthritic changes of the neck/lower back over the years  Chief Complaint:  Neck pain, lower back pain (denies any radicular pain UE/LE)  Progression of symptoms:  same over time    Pain Score:  Pain Assessment: 0-10  Pain Assessment  Pain Assessment: 0-10  0-10 (Numeric) Pain Score: 5 - Moderate pain (8/10 worst, 5/10 best)  Pain Type: Chronic pain  Pain Location: Neck  Pain Orientation: Right, Left  Pain Descriptors: Aching, Sharp  Pain Frequency: Constant/continuous  Pain Onset: Ongoing  Pain Type: Chronic  pain  Pain Location: Neck  Pain Orientation: Right, Left  Pain Descriptors: Aching, Sharp  Pain Frequency: Constant/continuous    Better with:  none    Worse with:  looking up, SB neck to the R, prolonged standing, lifting/pushing/pulling/carrying    Medical History/Surgical History:  Medical Precautions:  (HTN, Back surgery 30 years ago, OA, headaches, anxiety, hip pain)    Reviewed medical history form with patient (medications/allergies reviewed with patient).  Current Outpatient Medications   Medication Instructions   • busPIRone (BUSPAR) 15 mg, oral, 3 times daily   • celecoxib (CELEBREX) 100 mg, 2 times daily   • cyclobenzaprine (FLEXERIL) 10 mg, oral, Nightly   • estradiol (Estrace) 0.01 % (0.1 mg/gram) vaginal cream APPLY A PEA-SIZED AMOUNT DAILY   • FLUoxetine (PROZAC) 10 mg, oral, Daily   • FLUoxetine (PROZAC) 20 mg, oral, Daily   • fluticasone (Flonase) 50 mcg/actuation nasal spray 2 sprays, Each Nostril, Daily, Shake gently. Before first use, prime pump. After use, clean tip and replace cap. Use one hour before bedtime.   • indapamide (LOZOL) 1.25 mg, oral, Every morning   • levocetirizine (Xyzal) 5 mg tablet Take by mouth.   • montelukast (SINGULAIR) 10 mg, oral, Daily   • pantoprazole (PROTONIX) 40 mg, oral, Daily before breakfast   • sucralfate (CARAFATE) 1 g, oral, 3 times daily before meals   • valsartan (DIOVAN) 160 mg, oral, Daily     Radiology:    Exam Information    Status Exam Begun Exam Ended   Final 12/30/2024 08:49 12/30/2024 08:54     Study Result    Narrative & Impression   Interpreted By:  Ashly Duran,   STUDY:  XR CERVICAL SPINE 2-3 VIEWS 12/30/2024 8:54 am      INDICATION:  Signs/Symptoms:CERVICAL PAIN      COMPARISON:  None available.      ACCESSION NUMBER(S):  [id number]      ORDERING CLINICIAN:  YECENIA GENTILE      TECHNIQUE:  AP and lateral views of the cervical spine      FINDINGS:  Normal mineralization. There is straightening of cervical lordosis  which may be due to muscle spasm  or patient positioning. Moderate  disc space narrowing is seen from the C4-5 through the C6-7 levels  with moderate-sized anterior osteophytes arising from the endplates  from the C3-4 through the C6-7 levels. There is uncovertebral joint  spurring on the right from C4-5 through C6-7 and to a lesser extent  on the left at C5-6 and C6-7 levels.      No fracture, bony destruction, or precervical soft tissue swelling is  seen.      IMPRESSION:  Loss of cervical lordosis perhaps from muscle spasm or patient  positioning.      Degenerative disc disease from C4-5 through C6-7 with moderate  cervical spondylosis. Uncovertebral joint spurring as outlined above.     Exam Information    Status Exam Begun Exam Ended   Final 7/25/2024 11:11 7/25/2024 12:01     Study Result    Narrative & Impression   Interpreted By:  Doron Larsen,   STUDY:  MR LUMBAR SPINE W AND WO IV CONTRAST; ;  7/25/2024 12:01 pm      INDICATION:  Signs/Symptoms:back and right leg pain.      COMPARISON:  Plain film examination of 06/26/2024      ACCESSION NUMBER(S):  [id number]      ORDERING CLINICIAN:  GAYLE DORAN      TECHNIQUE:  Multiplanar and multisequential MR images of the lumbar spine are  performed. The study is supplemented with post gadolinium T1 weighted  sequences obtained in the sagittal and axial plane. 16 cc of  intravenous Dotarem is utilized.      FINDINGS:  For the purposes of communication I will refer to 5 lumbar type  vertebral bodies with sacralization of L5. The last rib vertebra will  be labeled T12.      There is levo convexity of the mid upper lumbar spine and dextro  convexity of the lower lumbar spine. There is mild posterior  subluxation of L4 relative to L5 measuring 4 mm there is anterior  subluxation of L3 relative to L4 measuring 5 mm.      There are multilevel discogenic degenerative changes of the lumbar  spine with disc desiccation disc space narrowing, more pronounced at  L4-5.      The lumbar vertebral body  heights are maintained. There is multilevel  degenerative endplate signal abnormality. There is multilevel  Schmorl's node deformities greater at the superior endplate of L2.  Scattered areas of increased signal are identified suspicious for fat  deposits and vertebral hemangiomas. This finding is greatest at L2 on  the right extending into the right pedicle a nonspecific round focus  of increased signal is identified in the left L2 transverse process  and could also reflect hemangioma.      Axial images are performed from the level of the T10-11 disc space  through S1 there is a large incompletely visualized cyst at the lower  pole the left kidney which is also noted on the localizing coronal  image. This finding measures at least 5.6 cm in diameter. Small  parapelvic cysts at the upper pole can not be excluded.      The T10-11 disc space level is incompletely visualized. There is no  significant central canal or neural foraminal stenosis.      The T11-12 disc space level demonstrates moderate to severe facet  arthrosis on the left and mild facet arthrosis on the right. There is  some effacement of the left posterolateral thecal sac with mild  central canal narrowing. There is mild narrowing of the left neural  foramen.      The T12-L1 disc space level demonstrates moderate facet arthrosis on  the right and mild facet arthrosis on the left with mild ligamentum  flavum hypertrophy. There is mild bulging disc with flattening of the  anterior thecal sac. There is mass effect upon the posterolateral  thecal sac, greater on the right. These findings contribute to at  least mild central canal narrowing. There is disc encroachment with  moderate narrowing at the caudal right neural foramen.      The L1-2 disc space level demonstrates moderate facet arthrosis on  the right and mild facet arthrosis on the left with mild to moderate  ligamentum flavum hypertrophy. There is mild circumferential bulging  disc with flattening  of the anterior thecal sac and mild to moderate  central canal narrowing. There is disc and osteophyte encroachment  with moderate narrowing at the caudal right neural foramen and mild  narrowing on the left.      The L2-3 disc space level demonstrates moderate to severe bilateral  facet arthrosis and moderate ligamentum flavum hypertrophy. There is  mild bulging disc with flattening of the anterior thecal sac and mild  to moderate central canal narrowing. There is disc encroachment with  moderate bilateral neural foraminal narrowing.      The L3-4 disc space level demonstrates severe bilateral facet  arthrosis. There may be previous partial laminectomy on the right.  There is moderate ligamentum flavum hypertrophy on the left. There is  circumferential bulging disc with mass effect upon the anterior  thecal sac and moderate central canal narrowing. There is disc  encroachment with mild narrowing at the caudal left neural foramen  and moderate to severe narrowing of the right neural foramen.      The L4-5 disc space level demonstrates moderate bilateral facet  arthrosis.. There may be partial laminectomy on the right. There is  moderate ligamentum flavum hypertrophy on the left. There is no  significant central canal narrowing. There is moderate narrowing of  the left neural foramen and minimal narrowing of the right neural  foramen.      IMPRESSION:  Please make note of the numbering system described above.      Multilevel discogenic degenerative changes lumbar spine with  degenerative scoliosis and degenerative posterior subluxation of L4.      Multilevel bulging disc with hypertrophic facet arthrosis and  ligamentum flavum hypertrophy. There is multilevel central canal  narrowing secondary to bulging disc and ligamentum flavum  hypertrophy. There is mild to moderate central canal narrowing at  L1-2 and L2-3 and moderate central canal narrowing at L3-4. There is  bilateral multilevel neural foraminal narrowing as  detailed above.  Right-sided neural foraminal narrowing is greatest at L1-2 and L4-5.      No acute osseous abnormality.      Incidental left renal cysts.     For lower back/neck only:  Previous PT:  YES  Previous chiropractic:  NO  Previous acupuncture:  NO  Previous pain management:  YES  Lower back surgery:  YES  Neck surgery:  NO    Functional Assessment:  Level of Gratiot:  Level of Gratiot: Independent with ADLs and functional transfers    Social History:    Work Status:  UNEMPLOYED  Patient Awareness:  Patient is aware of HER diagnosis and prognosis.  Social Support/History:  LIVES WITH FAMILY    Objective:    Weightbearing Status:  FWB    Skin:  skin intact over neck, lumbar surgical incision healed and closed.    Palpation:   point tenderness over bilateral upper trapezius, levator scapula, scalenes    Sensation:  Patient denies numbness/tingling of bilateral UPPER and LOWER extremities.    Gait:  decreased step length, decreased pelvic rotation bilaterally    ROM:  AROM  Cervical spine flexion end range pain  Extension end range pain  Rotation WNL's R and L  Lumbar AROM  Flexion WNL's  Extension end range pain  Rotation WNL's R and L  SB end range pain R and L  R knee AROM WNL's, L knee AROM WNL's, R hip AROM WNL's, L hip AROM WNL's, R ankle/foot AROM WNL's, and L ankle/foot AROM WNL's    Strength:    Bilateral hips 4/5 all planes  R knee 5/5 all planes, L knee 5/5 all planes, R ankle/foot 5/5 all planes, L ankle/foot 5/5 all planes, R shoulder 5/5 all planes, L shoulder 5/5 all planes, R elbow 5/5 all planes, and L elbow 5/5 all planes    Special Tests:  negative SLR R and L, no appreciable leg length difference    Joint Mobility:  mild stiffness thoracic spine with prone PA glides    Outcome measure     Other Measures  Neck Disability Index: 32%  Oswestry Disablity Index (COLEEN): 28%     Treatment  Time in clinic started at  8:30am  Time in clinic ended at  9:10am  Total time in clinic is . 40  minutes  Total timed code time is  38 minutes    Treatment Performed Today:.   PT Initial Evaluation, Therapeutic Exercise, and Self-Care/Home Management HEP  Individual(s) Educated: Patient  Education Provided: Home Exercise Program  Diagnosis and Precautions: M54.2 (ICD-10-CM) - Cervicalgia  M50.30 (ICD-10-CM) - Other cervical disc degeneration, unspecified cervical region  M48.062 (ICD-10-CM) - Spinal stenosis, lumbar region with neurogenic claudication  M96.1 (ICD-10-CM) - Postlaminectomy syndrome, not elsewhere classified  Risk and Benefits Discussed with Patient/Caregiver/Other: yes  Patient/Caregiver Demonstrated Understanding: yes  Plan of Care Discussed and Agreed Upon: yes  Patient Response to Education: Patient/Caregiver Verbalized Understanding of Information, Patient/Caregiver Performed Return Demonstration of Exercises/Activities, Patient/Caregiver Asked Appropriate Questions    Patient instructed in a home exercise program, has been given handouts for each of the exercises performed and was given another sheet instructing patient in the amount of reps to perform and the martina to follow while doing the exercises     Access Code: [email or web address]  URL: [email or web address]  Date: 03/13/2025  Prepared by: Scott Bergman    Exercises  - Supine Lower Trunk Rotation  - 1 x daily - 3-4 x weekly - 1 sets - 10 reps - 10 hold  - Supine Single Knee to Chest Stretch  - 1 x daily - 3-4 x weekly - 1 sets - 5 reps - 30 hold  - Supine Double Knee to Chest  - 1 x daily - 3-4 x weekly - 1 sets - 5 reps - 30 hold  - Supine Bridge  - 1 x daily - 3-4 x weekly - 2 sets - 10 reps - 5-10 hold  - Supine Posterior Pelvic Tilt  - 1 x daily - 3-4 x weekly - 2 sets - 10 reps - 5-10 hold  - Supine March  - 1 x daily - 3-4 x weekly - 2 sets - 10 reps - 5-10 hold  - Hooklying Heel Walk  - 1 x daily - 3-4 x weekly - 2 sets - 10 reps - 5-10 hold  - Bilateral Bent Leg Lift  - 1 x daily - 3-4 x weekly - 2 sets - 10 reps  - 5-10 hold  - Seated Levator Scapulae Stretch  - 1 x daily - 4-5 x weekly - 1 sets - 5 reps - 30 hold  - Seated Upper Trapezius Stretch  - 1 x daily - 4-5 x weekly - 1 sets - 5 reps - 30 hold  - Seated Scalene Stretch with Towel  - 1 x daily - 4-5 x weekly - 1 sets - 5 reps - 30 hold  - Supine Chin Tuck  - 1 x daily - 3-4 x weekly - 2 sets - 10 reps - 5  hold  - Supine Deep Neck Flexor Training - Repetitions  - 1 x daily - 3-4 x weekly - 1 sets - 10 reps - 10 hold         Current Participants as of 3/13/2025    Name Type Comments Contact Info    Debi Bradley PA-C Referring Provider  [phone]    Signature pending    Scott Bergman PT Physical Therapist  [phone]    Signature pending

## 2025-03-18 ENCOUNTER — APPOINTMENT (OUTPATIENT)
Dept: PHYSICAL THERAPY | Facility: CLINIC | Age: 70
End: 2025-03-18
Payer: MEDICARE

## 2025-03-18 DIAGNOSIS — M96.1 POSTLAMINECTOMY SYNDROME, NOT ELSEWHERE CLASSIFIED: ICD-10-CM

## 2025-03-18 DIAGNOSIS — F41.9 ANXIETY: Primary | ICD-10-CM

## 2025-03-18 DIAGNOSIS — M54.2 CERVICALGIA: ICD-10-CM

## 2025-03-18 DIAGNOSIS — M48.062 SPINAL STENOSIS, LUMBAR REGION WITH NEUROGENIC CLAUDICATION: ICD-10-CM

## 2025-03-18 RX ORDER — BUSPIRONE HYDROCHLORIDE 15 MG/1
15 TABLET ORAL 3 TIMES DAILY
Qty: 90 TABLET | Refills: 2 | Status: SHIPPED | OUTPATIENT
Start: 2025-03-18

## 2025-03-19 ENCOUNTER — TREATMENT (OUTPATIENT)
Dept: PHYSICAL THERAPY | Facility: CLINIC | Age: 70
End: 2025-03-19
Payer: MEDICARE

## 2025-03-19 DIAGNOSIS — M96.1 POSTLAMINECTOMY SYNDROME, NOT ELSEWHERE CLASSIFIED: ICD-10-CM

## 2025-03-19 DIAGNOSIS — M50.30 OTHER CERVICAL DISC DEGENERATION, UNSPECIFIED CERVICAL REGION: ICD-10-CM

## 2025-03-19 DIAGNOSIS — M48.062 SPINAL STENOSIS, LUMBAR REGION WITH NEUROGENIC CLAUDICATION: ICD-10-CM

## 2025-03-19 DIAGNOSIS — M54.2 CERVICALGIA: ICD-10-CM

## 2025-03-19 PROCEDURE — 97110 THERAPEUTIC EXERCISES: CPT | Mod: GP,CQ

## 2025-03-19 ASSESSMENT — PAIN SCALES - GENERAL
PAINLEVEL_OUTOF10: 6
PAINLEVEL_OUTOF10: 6

## 2025-03-19 ASSESSMENT — PAIN - FUNCTIONAL ASSESSMENT: PAIN_FUNCTIONAL_ASSESSMENT: 0-10

## 2025-03-19 NOTE — PROGRESS NOTES
Physical Therapy    Physical Therapy Treatment    Patient Name: Aaron Gilbert  MRN: [id number]  Today's Date: 3/19/2025    Time Entry:   Time Calculation  Start Time: 0100  Stop Time: 0140  Time Calculation (min): 40 min     PT Therapeutic Procedures Time Entry  Therapeutic Exercise Time Entry: 40   Insurance  Insurance reviewed  Name of Insurance:  Medicare  Visit No.  2  Total visits: 7  * (EVAL) CERVICALGIA M54.2; CERVICAL DISC DEGENERATION M50.30; SPINAL STENOSIS LUMBAR WITH NEUROGENIC CLAUDICATION M48.062; POSTLAMINECTOMY SYNDROME M96.1; MEDICARE AB / [employer] [place]: 20% COINSUR // 257 DED // UNLIMITED V BMN // KX MOD AFTER 20TH V // 0 OOP // [employer] [place] IS SEC TO MEDICARE AND WILL COV PART B COINSUR AFTER DED IS MET [id number]                 Assessment:  Modified deep neck flexor strengthening exercise to 5 sec hold instead of 10 sec and patient able to perform without difficulty.  Provided gentle STM to bilateral UT/levator scap in supine with patient reporting slight decrease in headache symptoms following manual. She also reported decreased low back pain at end of session. Reports relief with SKTC and DKTC stretches. Pt would benefit from PT to continue to address impairments in order to improve strength, flexibility, posture, and body mechanics and to decrease symptoms and increase overall function.   PT Assessment  PT Assessment Results: Decreased strength, Decreased range of motion, Decreased endurance, Decreased mobility, Pain  Plan:  Continue to progress neck and back strengthening as yanely   OP PT Plan  PT Plan: Skilled PT    Current Problem  1. Cervicalgia  Follow Up In Physical Therapy      2. Spinal stenosis, lumbar region with neurogenic claudication  Follow Up In Physical Therapy      3. Postlaminectomy syndrome, not elsewhere classified  Follow Up In Physical Therapy      4. Other cervical disc degeneration, unspecified cervical region  Follow Up In Physical Therapy          General      General  Referred By: Debi Bradley PA-C  General Comment: M54.2 (ICD-10-CM) - Cervicalgia  M50.30 (ICD-10-CM) - Other cervical disc degeneration, unspecified cervical region  M48.062 (ICD-10-CM) - Spinal stenosis, lumbar region with neurogenic claudication  M96.1 (ICD-10-CM) - Postlaminectomy syndrome, not elsewhere classified    Subjective    Pt continues to report neck and low back pain. She states that she has been doing HEP. She reports minimal difficulty with deep neck flexor strengthening due to difficulty holding for 10 sec. Pt reports having headache and intermittent dizziness today. She states that she has had these symptoms for years and her doctors are aware.   Precautions  STEADI Fall Risk Score (The score of 4 or more indicates an increased risk of falling): 0  Medical Precautions:  (HTN, Back surgery 30 years ago, OA, headaches, anxiety, hip pain))     Pain  Pain Assessment  Pain Assessment: 0-10  0-10 (Numeric) Pain Score: 6  Multiple Pain Sites: Two  Pain 2  Pain Score 2: 6  Pain Location 2: Back    Objective      /82  Pulse 82       Tightness noted in bilateral upper trap/levator scapulae             Treatments:     LTR x10  SKTC stretch 30 sec x 2 B  DKTC stretch 30 sec x 2  Bridges x10  PPT 5 sec x 10   DLS march x 10   Hooklying heel walk x10  Levator scapula stretch 30 sec x 2 B  Upper trap stretch 30 sec x 2 B  Scalene stretch 30 sec x 2 B   Supine chin tuck x 10  Supine deep neck flexor training 5 sec hold x 10  Includes gentle STM to bilateral upper trap/lev scap in supine   OP EDUCATION:       Goals:  1. Pain Management  Goal: Within 2-3  weeks, the patient will report a decrease in neck/lower back pain from 5/10 to 2-3/10 or less during daily activities (e.g., turning head while driving, using a phone), as measured by a pain rating scale.     2. Strength and Endurance  Goal: Within 6-7 weeks, the patient’s deep cervical flexor endurance will improve (e.g., able to hold a chin tuck  for 20 seconds without loss of form or pain above 2/10), promoting better head and neck stability during upright activities.     3. Neuromuscular Control, Proprioception, and Coordination  Goal: Within 4 weeks, the patient will demonstrate improved cervical proprioception by accurately repositioning the head within 2° of neutral (as measured by a laser pointer or CROM device) and maintaining a neutral spine during functional tasks.     4. Functional Activities / ADLs  Goal: Within 4 weeks, the patient will independently perform daily tasks (e.g., reading, computer work, household chores) for 30-minute intervals without increasing neck/lower back pain above 2/10 or adopting faulty postures (e.g., forward head).     5. Posture and Ergonomics  Goal: Within 3 weeks, the patient will implement recommended ergonomic adjustments (e.g., proper monitor height, lumbar support) and maintain neutral cervical alignment at least 80% of the workday, as monitored by periodic self-checks or therapist observation.     6. Education, Self-Management, and Compliance  Goal: By the second therapy visit, the patient will demonstrate understanding of home exercise program (HEP) for cervical/lumbar mobility and posture correction, accurately perform each exercise with proper technique, and document compliance in a daily log.     7. Palpation and Tissue Healing  Goal: Within 2 weeks, the patient will demonstrate reduced tenderness to palpation over the upper trapezius and cervical paraspinals from 5/10 to 2/10, indicating decreased muscle tension and inflammation.     8. Long-Term Maintenance and Prevention  Goal: By discharge (approximately 7 weeks), the patient will establish a regular cervical/lumbar spine exercise routine (e.g., daily stretches, postural breaks, strength exercises) with no significant pain flare-ups (>3/10) during normal daily or recreational activities.

## 2025-03-24 ENCOUNTER — TREATMENT (OUTPATIENT)
Dept: PHYSICAL THERAPY | Facility: CLINIC | Age: 70
End: 2025-03-24
Payer: MEDICARE

## 2025-03-24 DIAGNOSIS — M48.062 SPINAL STENOSIS, LUMBAR REGION WITH NEUROGENIC CLAUDICATION: ICD-10-CM

## 2025-03-24 DIAGNOSIS — M54.2 CERVICALGIA: ICD-10-CM

## 2025-03-24 DIAGNOSIS — M50.30 OTHER CERVICAL DISC DEGENERATION, UNSPECIFIED CERVICAL REGION: ICD-10-CM

## 2025-03-24 DIAGNOSIS — M96.1 POSTLAMINECTOMY SYNDROME, NOT ELSEWHERE CLASSIFIED: ICD-10-CM

## 2025-03-24 PROCEDURE — 97140 MANUAL THERAPY 1/> REGIONS: CPT | Mod: GP

## 2025-03-24 PROCEDURE — 97110 THERAPEUTIC EXERCISES: CPT | Mod: GP

## 2025-03-24 ASSESSMENT — PAIN DESCRIPTION - DESCRIPTORS: DESCRIPTORS: SHARP

## 2025-03-24 ASSESSMENT — PAIN - FUNCTIONAL ASSESSMENT: PAIN_FUNCTIONAL_ASSESSMENT: 0-10

## 2025-03-24 ASSESSMENT — PAIN SCALES - GENERAL: PAINLEVEL_OUTOF10: 5 - MODERATE PAIN

## 2025-03-24 NOTE — PROGRESS NOTES
Physical Therapy    Physical Therapy Treatment    Patient Name: Aaron Gilbert  MRN: [id number]  Today's Date: 3/24/2025    Time Entry:   Time Calculation  Start Time: 1100  Stop Time: 1144  Time Calculation (min): 44 min     PT Therapeutic Procedures Time Entry  Manual Therapy Time Entry: 10  Therapeutic Exercise Time Entry: 34   Insurance  Insurance reviewed  Name of Insurance:  Medicare  Visit No.  3  Total visits: 7  * (EVAL) CERVICALGIA M54.2; CERVICAL DISC DEGENERATION M50.30; SPINAL STENOSIS LUMBAR WITH NEUROGENIC CLAUDICATION M48.062; POSTLAMINECTOMY SYNDROME M96.1; MEDICARE AB / [employer] [place]: 20% COINSUR // 257 DED // UNLIMITED V BMN // KX MOD AFTER 20TH V // 0 OOP // [employer] [place] IS SEC TO MEDICARE AND WILL COV PART B COINSUR AFTER DED IS MET [id number]                 Assessment:  Deep neck flex exercises continue to increase pain to mid back, but resolves with rest an STM. Grade 1/2 mobs to C-spine with no decrease in pain. With segmental stretching, pt has c/o pain to mid-back at end range. Pt would benefit from continued skilled therapy to improve pain management.      Plan:  Continue to progress neck and back strengthening as yanely for 1x/week.   OP PT Plan  Treatment/Interventions: Education/ Instruction, Therapeutic exercises, Therapeutic activities  PT Plan: Skilled PT  PT Frequency: 1 time per week    Current Problem  1. Cervicalgia  Follow Up In Physical Therapy      2. Spinal stenosis, lumbar region with neurogenic claudication  Follow Up In Physical Therapy      3. Postlaminectomy syndrome, not elsewhere classified  Follow Up In Physical Therapy      4. Other cervical disc degeneration, unspecified cervical region  Follow Up In Physical Therapy          General     General  Reason for Referral: follow up  Referred By: Debi Bradley PA-C    Subjective    Pt reports continued back  pain with no improvement, but no dizziness. Headaches continue. Declines HEP causing increased pain. Good days  and bad days, but no days of 0/10 pain.    Precautions  STEADI Fall Risk Score (The score of 4 or more indicates an increased risk of falling): 0  Medical Precautions:  (HTN, Back surgery 30 years ago, OA, headaches, anxiety, hip pain))     Pain  Pain Assessment  Pain Assessment: 0-10  0-10 (Numeric) Pain Score: 5 - Moderate pain  Pain Type: Chronic pain  Pain Descriptors: Sharp  Pain Frequency: Constant/continuous4/10    Objective                    Treatments:     LTR 2x10  SKTC stretch 30 sec x 2 B N/T  DKTC stretch 30 sec 2x 10 with 3 sec pause  Bridges on  brown wedge 2 x10  PPT 5 sec x 10 N/T  DLS march x 10  N/T  Hooklying heel walk x10 N/T  Levator scapula stretch 30 sec x 2 B  Upper trap stretch 30 sec 2x 2 B  Scalene stretch 30 sec x 2 B   Supine chin tuck 2 x 10, requiring rest break due to pain through middle of spine.   Supine deep neck flexor training 5 sec hold 2x 10  Seated deep neck flex exercise with 5 sec hold: 2x10       -Second set with towel resistance from pt.  Self stretch with towel for upper/lower cervical spine: 3x30 sec  Includes gentle STM to bilateral upper trap/lev scap in supine x10 min  Y position at wall, with horizontal shoulder abd: 2x12  Upright row with black bands: 2x12    OP EDUCATION:   Access Code: [email or web address]  URL: [email or web address]  Date: 03/24/2025  Prepared by: Rubio Lemus    Exercises  - Seated Cervical Flexion Stretch with Finger Support Behind Neck  - 2 x daily - 7 x weekly - 3 sets - 10 reps - 10sec hold  - Low Trap Setting at Wall  - 1 x daily - 7 x weekly - 3 sets - 12 reps  - Standing Row with Anchored Resistance  - 1 x daily - 7 x weekly - 3 sets - 12 reps - 2 sec hold hold    Goals:  1. Pain Management  Goal: Within 2-3  weeks, the patient will report a decrease in neck/lower back pain from 5/10 to 2-3/10 or less during daily activities (e.g., turning head while driving, using a phone), as measured by a pain rating scale.     2. Strength  and Endurance  Goal: Within 6-7 weeks, the patient’s deep cervical flexor endurance will improve (e.g., able to hold a chin tuck for 20 seconds without loss of form or pain above 2/10), promoting better head and neck stability during upright activities.     3. Neuromuscular Control, Proprioception, and Coordination  Goal: Within 4 weeks, the patient will demonstrate improved cervical proprioception by accurately repositioning the head within 2° of neutral (as measured by a laser pointer or CROM device) and maintaining a neutral spine during functional tasks.     4. Functional Activities / ADLs  Goal: Within 4 weeks, the patient will independently perform daily tasks (e.g., reading, computer work, household chores) for 30-minute intervals without increasing neck/lower back pain above 2/10 or adopting faulty postures (e.g., forward head).       5. Posture and Ergonomics  Goal: Within 3 weeks, the patient will implement recommended ergonomic adjustments (e.g., proper monitor height, lumbar support) and maintain neutral cervical alignment at least 80% of the workday, as monitored by periodic self-checks or therapist observation.     6. Education, Self-Management, and Compliance  Goal: By the second therapy visit, the patient will demonstrate understanding of home exercise program (HEP) for cervical/lumbar mobility and posture correction, accurately perform each exercise with proper technique, and document compliance in a daily log.     7. Palpation and Tissue Healing  Goal: Within 2 weeks, the patient will demonstrate reduced tenderness to palpation over the upper trapezius and cervical paraspinals from 5/10 to 2/10, indicating decreased muscle tension and inflammation.     8. Long-Term Maintenance and Prevention  Goal: By discharge (approximately 7 weeks), the patient will establish a regular cervical/lumbar spine exercise routine (e.g., daily stretches, postural breaks, strength exercises) with no significant pain  flare-ups (>3/10) during normal daily or recreational activities.     Rubio Lemus  3/24/25

## 2025-03-26 ENCOUNTER — APPOINTMENT (OUTPATIENT)
Dept: PRIMARY CARE | Facility: CLINIC | Age: 70
End: 2025-03-26
Payer: MEDICARE

## 2025-03-26 VITALS
HEIGHT: 63 IN | TEMPERATURE: 97 F | BODY MASS INDEX: 29.23 KG/M2 | DIASTOLIC BLOOD PRESSURE: 82 MMHG | RESPIRATION RATE: 16 BRPM | OXYGEN SATURATION: 99 % | HEART RATE: 74 BPM | SYSTOLIC BLOOD PRESSURE: 114 MMHG | WEIGHT: 165 LBS

## 2025-03-26 DIAGNOSIS — F32.A DEPRESSION, UNSPECIFIED DEPRESSION TYPE: ICD-10-CM

## 2025-03-26 DIAGNOSIS — E66.3 OVERWEIGHT WITH BODY MASS INDEX (BMI) OF 29 TO 29.9 IN ADULT: ICD-10-CM

## 2025-03-26 DIAGNOSIS — R00.1 BRADYCARDIA: ICD-10-CM

## 2025-03-26 DIAGNOSIS — K58.1 IRRITABLE BOWEL SYNDROME WITH PREDOMINANT CONSTIPATION: ICD-10-CM

## 2025-03-26 DIAGNOSIS — I10 BENIGN ESSENTIAL HTN: ICD-10-CM

## 2025-03-26 DIAGNOSIS — J32.9 CHRONIC SINUSITIS, UNSPECIFIED LOCATION: Primary | ICD-10-CM

## 2025-03-26 DIAGNOSIS — I10 ESSENTIAL (PRIMARY) HYPERTENSION: ICD-10-CM

## 2025-03-26 PROCEDURE — 1159F MED LIST DOCD IN RCRD: CPT | Performed by: FAMILY MEDICINE

## 2025-03-26 PROCEDURE — 3074F SYST BP LT 130 MM HG: CPT | Performed by: FAMILY MEDICINE

## 2025-03-26 PROCEDURE — 3079F DIAST BP 80-89 MM HG: CPT | Performed by: FAMILY MEDICINE

## 2025-03-26 PROCEDURE — 1123F ACP DISCUSS/DSCN MKR DOCD: CPT | Performed by: FAMILY MEDICINE

## 2025-03-26 PROCEDURE — 99214 OFFICE O/P EST MOD 30 MIN: CPT | Performed by: FAMILY MEDICINE

## 2025-03-26 PROCEDURE — G2211 COMPLEX E/M VISIT ADD ON: HCPCS | Performed by: FAMILY MEDICINE

## 2025-03-26 PROCEDURE — 1036F TOBACCO NON-USER: CPT | Performed by: FAMILY MEDICINE

## 2025-03-26 PROCEDURE — 3008F BODY MASS INDEX DOCD: CPT | Performed by: FAMILY MEDICINE

## 2025-03-26 RX ORDER — PREDNISONE 20 MG/1
TABLET ORAL
Qty: 15 TABLET | Refills: 0 | Status: SHIPPED | OUTPATIENT
Start: 2025-03-26 | End: 2025-04-05

## 2025-03-26 RX ORDER — DOXYCYCLINE HYCLATE 100 MG
100 TABLET ORAL 2 TIMES DAILY
Qty: 20 TABLET | Refills: 0 | Status: SHIPPED | OUTPATIENT
Start: 2025-03-26 | End: 2025-04-05

## 2025-03-26 ASSESSMENT — ANXIETY QUESTIONNAIRES
2. NOT BEING ABLE TO STOP OR CONTROL WORRYING: NOT AT ALL
3. WORRYING TOO MUCH ABOUT DIFFERENT THINGS: NOT AT ALL
4. TROUBLE RELAXING: NOT AT ALL
6. BECOMING EASILY ANNOYED OR IRRITABLE: NOT AT ALL
7. FEELING AFRAID AS IF SOMETHING AWFUL MIGHT HAPPEN: NOT AT ALL
1. FEELING NERVOUS, ANXIOUS, OR ON EDGE: NOT AT ALL
5. BEING SO RESTLESS THAT IT IS HARD TO SIT STILL: NOT AT ALL
IF YOU CHECKED OFF ANY PROBLEMS ON THIS QUESTIONNAIRE, HOW DIFFICULT HAVE THESE PROBLEMS MADE IT FOR YOU TO DO YOUR WORK, TAKE CARE OF THINGS AT HOME, OR GET ALONG WITH OTHER PEOPLE: NOT DIFFICULT AT ALL
GAD7 TOTAL SCORE: 0

## 2025-03-26 ASSESSMENT — ENCOUNTER SYMPTOMS
LOSS OF SENSATION IN FEET: 0
DEPRESSION: 0
OCCASIONAL FEELINGS OF UNSTEADINESS: 0

## 2025-03-26 NOTE — PROGRESS NOTES
Subjective   Patient ID: Aaron Gilbert is a 70 y.o. female who presents for Follow Up of Hypertension, IBS and Depression . I last saw the patient on 12/19/2024  .     HPI  Patient states that she is still fatigued a lot. Patient has difficulty sleeping due to her arthritis pain. She does see pain management.    Patient also notes having sinus infection and has taken a course of amoxicillin with minimal improvement. She would like to try other medication to relieve her symptoms.    Patient also notices bilateral headaches.     Past medical, surgical, and family history reviewed.  Reviewed and documented all medications   Pt eating well, exercising as tolerated and taking medications as directed.      Review of Systems  Except positives as noted in the CC & HPI      Constitutional: Denies fevers, chills, night sweats, fatigue, weight changes, change in appetite    Eyes: Denies blurry vision, double vision    ENT: Denies otalgia, trouble hearing, tinnitus, vertigo, nasal congestion, rhinorrhea, sore throat    Neck: Denies swelling, masses    Cardiovascular: Denies chest pain, palpitations, edema, orthopnea, syncope    Respiratory: Denies dyspnea, cough, wheezing, postural nocturnal dyspnea    Gastrointestinal: Denies abdominal pain, nausea, vomiting, diarrhea, constipation, melena, hematochezia    Genitourinary: Denies dysuria, hematuria  Musculoskeletal: Denies back pain, neck pain, arthralgias, myalgias    Integumentary: Denies skin lesions, rashes, masses    Neurological: Denies dizziness, headaches, confusion, limb weakness, paresthesias, syncope, convulsions    Psychiatric: Denies depression, anxiety, homicidal ideations, suicidal ideations, sleep disturbances    Endocrine: Denies polyphagia, polydipsia, polyuria, weakness, hair thinning, heat intolerance, cold intolerance, weight changes    Heme/Lymph: Denies easy bruising, easy bleeding, swollen glands    Objective   Vitals:    03/26/25 0850   BP: 114/82  "  Pulse: 74   Resp: 16   Temp: 36.1 °C (97 °F)   SpO2: 99%   Weight: 74.8 kg (165 lb)   Height: 1.6 m (5' 3\")        Physical Exam  Gen. Appearance - well-developed, well-nourished, 69 y.o., White female in no acute distress.      Skin - warm, pink and dry without rash or concerning lesions.      Mental Status - alert and oriented times 3. Normal mood and affect appropriate to mood.     Face - No tenderness to percussion of maxillary and frontal sinuses.     Ears - TMs shiny and move well with insufflation. Mild cerumen impaction in the right ear canal. None on the left.    Nose - nasal passages are clear bilaterally without bleeding or nasal discharge.    Mouth - pharynx is pink without exudates. Dentition is normal appearing. Tongue and uvula move in the midline.      Neck - supple without lymphadenopathy. Carotid pulses are normal without bruits. Thyroid is normal in midline without nodules.     Chest - lungs are clear to auscultation without rales, rhonchi or wheezes.      Heart - regular, rate, and rhythm without murmurs, rubs or gallops.      Abdomen - soft, mildly obese, nontender, nondistended. No masses, hepatomegaly or splenomegaly is noted. No rebound, rigidity or guarding is noted. Bowel sounds are normoactive.      Extremities - no cyanosis, clubbing or edema. Pedal pulses are 2+ normal at the dorsalis pedis and posterior tibial pulses bilaterally.      Neurological - cranial nerves II through XII are grossly intact. Motor strength 5/5 at all fours.     Assessment   1. Chronic sinusitis, unspecified location  doxycycline (Vibra-Tabs) 100 mg tablet    predniSONE (Deltasone) 20 mg tablet    CT sinus wo IV contrast      2. Depression, unspecified depression type        3. Benign essential HTN        4. Irritable bowel syndrome with predominant constipation        5. Essential (primary) hypertension        6. Bradycardia        7. Overweight with body mass index (BMI) of 29 to 29.9 in adult          Patient " to continue current medications (with any exceptions as noted) and diet. Follow-up in 6 month(s) otherwise as needed.      Patient was instructed to drink plenty of fluids. Take Tylenol or Advil for pain or fever. Follow up if signs or symptoms persist or worsen otherwise when necessary. Patient may take Mucinex DM OTC as directed for cough and Flonase (fluticasone) nasal spray OTC as directed for nasal and sinus congestion.    Patient was started on:   Doxycycline Monohydrate 100 mg, 1 TAB TWICE DAILY FOR 10 DAYS   Prednisone 20 mg, TAKE 2 TABS IN THE MORNING FOR 5 DAYS THEN 1 TAB IN THE MORNING FOR 5 DAYS     Rx(s) sent to pharmacy.     Ordered CT sinuses WO IV contrast. Will call patient with results when available.       Patient is to follow up with pain management, , Dr. Cobb and Dr. Jha as scheduled.     Scribe Attestation  By signing my name below, ILula , Domingoibsumanth   attest that this documentation has been prepared under the direction and in the presence of Landon Wells MD.      All medical record entries made by the scribe were at my direction and personally dictated by me. I have reviewed the chart and agree that the record accurately reflects my personal performance of the history, physical exam, discussion, and plan.    Landon Wells M.D.

## 2025-03-27 NOTE — PROGRESS NOTES
Physical Therapy    Physical Therapy Treatment    Patient Name: Aaron Gilbert  MRN: [id number]  Today's Date: 3/31/2025    Time Entry:   Time Calculation  Start Time: 1101  Stop Time: 1145  Time Calculation (min): 44 min     PT Therapeutic Procedures Time Entry  Manual Therapy Time Entry: 10  Therapeutic Exercise Time Entry: 34   Insurance  Insurance reviewed  Name of Insurance:  Medicare  Visit No.  4  Total visits: 7  * (EVAL) CERVICALGIA M54.2; CERVICAL DISC DEGENERATION M50.30; SPINAL STENOSIS LUMBAR WITH NEUROGENIC CLAUDICATION M48.062; POSTLAMINECTOMY SYNDROME M96.1; MEDICARE AB / [employer] [place]: 20% COINSUR // 257 DED // UNLIMITED V BMN // KX MOD AFTER 20TH V // 0 OOP // [employer] [place] IS SEC TO MEDICARE AND WILL COV PART B COINSUR AFTER DED IS MET [id number]                 Assessment:  Minor pain that radiates from neck to medical boarder of scap during open book and shoulder abd, but resolves with shorter ROM. Pt continues to be a good candidate for physical therapy to improve pain.        Plan:  Continue 1x/week with focus on neck and back strengthening as yanely.        Current Problem  1. Cervicalgia  Follow Up In Physical Therapy      2. Spinal stenosis, lumbar region with neurogenic claudication  Follow Up In Physical Therapy      3. Postlaminectomy syndrome, not elsewhere classified  Follow Up In Physical Therapy      4. Other cervical disc degeneration, unspecified cervical region  Follow Up In Physical Therapy          General  PT  Visit  PT Received On: 03/31/25  Response to Previous Treatment: Patient with no complaints from previous session.       Subjective    Pt reports continued back  pain with no improvement, but no dizziness. Headaches continue. Declines HEP causing increased pain. Good days and bad days, but no days of 0/10 pain.    Precautions  STEADI Fall Risk Score (The score of 4 or more indicates an increased risk of falling): 0  Medical Precautions:  (HTN, Back surgery 30 years ago,  OA, headaches, anxiety, hip pain))     Pain  Pain Assessment  Pain Assessment: 0-10  0-10 (Numeric) Pain Score: 4  Pain Type: Chronic pain  Pain 2  Pain Score 2: 5 - Moderate pain4/10    Objective   Pt requires cues for resisting hip motion during open book exercises, but has good carryover.                  Treatments:     LTR 2x10  SKTC stretch 30 sec x 2 B N/T  DKTC stretch 30 sec 2x 10 with 3 sec pause  Bridges, adding fwd/backward stepping with each foot x2: 2x10  PPT 5 sec x 10 N/T  DLS march x 10  N/T  Hooklying heel walk x10 N/T  Levator scapula stretch 30 sec x 2 B NT  Upper trap stretch 30 sec 2x 2 B MT  Scalene stretch 30 sec x 2 B NT  Supine chin tuck 2 x 10 with 5 sec pause  Supine deep neck flexor training 5 sec hold 2x 10  Seated deep neck flex exercise with 5 sec hold: 2x10       -Second set with towel resistance from pt.  Self stretch with towel for upper/lower cervical spine: 3x30 sec  Wall slide into flex/ext with towel: 2x12       - second set with yellow TB resistance into abd  Shoulder add with Green TB: 1x12       - Progressed to black TB: 1x12  Y position at wall, with horizontal shoulder abd: 2x12 N/T  Upright row with black bands: 2x12 NT  Side lying open book: 2x10 each side  Side lying shoulder abd with 1lb DB: 2x10 each side  Horizontal shoulder abd with Green TB: single arm alt for 2x12    Manual tx:  Joint mob in A/P to C-spine: 3min  PROM neck motion into  all directions: 3 min  STM to bilateral upper trap/lev scap in supine: 4 min    OP EDUCATION:  Access Code: [email or web address]  URL: [email or web address]  Date: 03/31/2025  Prepared by: Rubio Lemus    Exercises  - Sidelying Thoracic Rotation with Open Book  - 1 x daily - 5 x weekly - 3 sets - 12 reps - 2 sec hold  - Standing Shoulder Diagonal Horizontal Abduction 60/120 Degrees with Resistance  - 1 x daily - 5 x weekly - 3 sets - 12 reps - 2 sec hold     Access Code: [email or web address]  URL:  [email or web address]  Date: 03/24/2025  Prepared by: Rubio Lemus    Exercises  - Seated Cervical Flexion Stretch with Finger Support Behind Neck  - 2 x daily - 7 x weekly - 3 sets - 10 reps - 10sec hold  - Low Trap Setting at Wall  - 1 x daily - 7 x weekly - 3 sets - 12 reps  - Standing Row with Anchored Resistance  - 1 x daily - 7 x weekly - 3 sets - 12 reps - 2 sec hold hold    Goals:  1. Pain Management  Goal: Within 2-3  weeks, the patient will report a decrease in neck/lower back pain from 5/10 to 2-3/10 or less during daily activities (e.g., turning head while driving, using a phone), as measured by a pain rating scale.     2. Strength and Endurance  Goal: Within 6-7 weeks, the patient’s deep cervical flexor endurance will improve (e.g., able to hold a chin tuck for 20 seconds without loss of form or pain above 2/10), promoting better head and neck stability during upright activities.     3. Neuromuscular Control, Proprioception, and Coordination  Goal: Within 4 weeks, the patient will demonstrate improved cervical proprioception by accurately repositioning the head within 2° of neutral (as measured by a laser pointer or CROM device) and maintaining a neutral spine during functional tasks.     4. Functional Activities / ADLs  Goal: Within 4 weeks, the patient will independently perform daily tasks (e.g., reading, computer work, household chores) for 30-minute intervals without increasing neck/lower back pain above 2/10 or adopting faulty postures (e.g., forward head).  Goal met 3/31/25     5. Posture and Ergonomics  Goal: Within 3 weeks, the patient will implement recommended ergonomic adjustments (e.g., proper monitor height, lumbar support) and maintain neutral cervical alignment at least 80% of the workday, as monitored by periodic self-checks or therapist observation.     6. Education, Self-Management, and Compliance  Goal: By the second therapy visit, the patient will demonstrate  understanding of home exercise program (HEP) for cervical/lumbar mobility and posture correction, accurately perform each exercise with proper technique, and document compliance in a daily log.     7. Palpation and Tissue Healing  Goal: Within 2 weeks, the patient will demonstrate reduced tenderness to palpation over the upper trapezius and cervical paraspinals from 5/10 to 2/10, indicating decreased muscle tension and inflammation.     8. Long-Term Maintenance and Prevention  Goal: By discharge (approximately 7 weeks), the patient will establish a regular cervical/lumbar spine exercise routine (e.g., daily stretches, postural breaks, strength exercises) with no significant pain flare-ups (>3/10) during normal daily or recreational activities.     Rubio Lemus  3/31/25

## 2025-03-28 ENCOUNTER — HOSPITAL ENCOUNTER (OUTPATIENT)
Dept: RADIOLOGY | Facility: HOSPITAL | Age: 70
Discharge: HOME | End: 2025-03-28
Payer: MEDICARE

## 2025-03-28 DIAGNOSIS — J32.9 CHRONIC SINUSITIS, UNSPECIFIED LOCATION: ICD-10-CM

## 2025-03-28 PROCEDURE — 70486 CT MAXILLOFACIAL W/O DYE: CPT

## 2025-03-31 ENCOUNTER — TREATMENT (OUTPATIENT)
Dept: PHYSICAL THERAPY | Facility: CLINIC | Age: 70
End: 2025-03-31
Payer: MEDICARE

## 2025-03-31 DIAGNOSIS — M50.30 OTHER CERVICAL DISC DEGENERATION, UNSPECIFIED CERVICAL REGION: ICD-10-CM

## 2025-03-31 DIAGNOSIS — M48.062 SPINAL STENOSIS, LUMBAR REGION WITH NEUROGENIC CLAUDICATION: ICD-10-CM

## 2025-03-31 DIAGNOSIS — M54.2 CERVICALGIA: ICD-10-CM

## 2025-03-31 DIAGNOSIS — M96.1 POSTLAMINECTOMY SYNDROME, NOT ELSEWHERE CLASSIFIED: ICD-10-CM

## 2025-03-31 PROCEDURE — 97140 MANUAL THERAPY 1/> REGIONS: CPT | Mod: GP

## 2025-03-31 PROCEDURE — 97110 THERAPEUTIC EXERCISES: CPT | Mod: GP

## 2025-03-31 ASSESSMENT — PAIN - FUNCTIONAL ASSESSMENT: PAIN_FUNCTIONAL_ASSESSMENT: 0-10

## 2025-03-31 ASSESSMENT — PAIN SCALES - GENERAL
PAINLEVEL_OUTOF10: 4
PAINLEVEL_OUTOF10: 5 - MODERATE PAIN

## 2025-04-02 ENCOUNTER — APPOINTMENT (OUTPATIENT)
Dept: DERMATOLOGY | Facility: CLINIC | Age: 70
End: 2025-04-02
Payer: MEDICARE

## 2025-04-10 ENCOUNTER — APPOINTMENT (OUTPATIENT)
Dept: CARDIOLOGY | Facility: CLINIC | Age: 70
End: 2025-04-10
Payer: MEDICARE

## 2025-04-11 ENCOUNTER — APPOINTMENT (OUTPATIENT)
Dept: PHYSICAL THERAPY | Facility: CLINIC | Age: 70
End: 2025-04-11
Payer: MEDICARE

## 2025-04-11 DIAGNOSIS — M48.062 SPINAL STENOSIS, LUMBAR REGION WITH NEUROGENIC CLAUDICATION: ICD-10-CM

## 2025-04-11 DIAGNOSIS — M54.2 CERVICALGIA: ICD-10-CM

## 2025-04-11 DIAGNOSIS — M96.1 POSTLAMINECTOMY SYNDROME, NOT ELSEWHERE CLASSIFIED: ICD-10-CM

## 2025-04-15 ENCOUNTER — TREATMENT (OUTPATIENT)
Dept: PHYSICAL THERAPY | Facility: CLINIC | Age: 70
End: 2025-04-15
Payer: MEDICARE

## 2025-04-15 DIAGNOSIS — M54.2 CERVICALGIA: ICD-10-CM

## 2025-04-15 DIAGNOSIS — M50.30 OTHER CERVICAL DISC DEGENERATION, UNSPECIFIED CERVICAL REGION: ICD-10-CM

## 2025-04-15 DIAGNOSIS — M96.1 POSTLAMINECTOMY SYNDROME, NOT ELSEWHERE CLASSIFIED: ICD-10-CM

## 2025-04-15 DIAGNOSIS — M48.062 SPINAL STENOSIS, LUMBAR REGION WITH NEUROGENIC CLAUDICATION: ICD-10-CM

## 2025-04-15 PROCEDURE — 97110 THERAPEUTIC EXERCISES: CPT | Mod: GP | Performed by: PHYSICAL THERAPIST

## 2025-04-15 ASSESSMENT — PAIN SCALES - GENERAL
PAINLEVEL_OUTOF10: 6
PAINLEVEL_OUTOF10: 6

## 2025-04-15 ASSESSMENT — PAIN - FUNCTIONAL ASSESSMENT: PAIN_FUNCTIONAL_ASSESSMENT: 0-10

## 2025-04-15 NOTE — PROGRESS NOTES
PHYSICAL THERAPY TREATMENT    Patient name:  Aaron Gilbert    MRN:  [id number]    :  1955    Today's Date:  04/15/25    Time Calculation  Start Time: 1035  Stop Time: 1117  Time Calculation (min): 42 min     PT Therapeutic Procedures Time Entry  Therapeutic Exercise Time Entry: 38       Referral by:  Referred By: Debi Bradley PA-C    Diagnoses:  Diagnosis and Precautions: M54.2 (ICD-10-CM) - Cervicalgia  M50.30 (ICD-10-CM) - Other cervical disc degeneration, unspecified cervical region  M48.062 (ICD-10-CM) - Spinal stenosis, lumbar region with neurogenic claudication  M96.1 (ICD-10-CM) - Postlaminectomy syndrome, not elsewhere classified    Assessment/Plan:  Therapeutic exercise performed in order to improve neck/core strength/ROM/mobility.  Patient requires increased tactile/verbal cues with exercise in order to reduce cervical/lumbar spine stress/strain during the particular exercise performed.  Patient challenged with exercises performed in clinic secondary to core/neck fatigue.   PT Assessment  PT Assessment Results: Decreased strength, Decreased range of motion, Decreased endurance, Decreased mobility, Pain  Rehab Prognosis: Good  Evaluation/Treatment Tolerance: Patient limited by fatigue, Patient limited by pain  OP PT Plan  PT Plan: Skilled PT  Rehab Potential: Good  Plan of Care Agreement: Patient    General Visit Information  PT  Visit  PT Received On: 04/15/25    General  Reason for Referral: PT Evaluation  Referred By: Debi Bradley PA-C  General Comment: M54.2 (ICD-10-CM) - Cervicalgia  M50.30 (ICD-10-CM) - Other cervical disc degeneration, unspecified cervical region  M48.062 (ICD-10-CM) - Spinal stenosis, lumbar region with neurogenic claudication  M96.1 (ICD-10-CM) - Postlaminectomy syndrome, not elsewhere classified    Insurance  Insurance reviewed  Name of Insurance:  Medicare  Visit No.  5  CERVICALGIA M54.2; CERVICAL DISC DEGENERATION M50.30; SPINAL STENOSIS LUMBAR WITH NEUROGENIC  "CLAUDICATION M48.062; POSTLAMINECTOMY SYNDROME M96.1; MEDICARE AB / [employer]: 20% COINSUR // 257 DED // UNLIMITED V BMN // KX MOD AFTER 20TH V // 0 OOP // [employer] IS SEC TO MEDICARE AND WILL COV PART B COINSUR AFTER DED IS MET [id number]     Subjective:  Patient reports that her neck feels a little better.    Precautions  Medical Precautions:  (HTN, Back surgery 30 years ago, OA, headaches, anxiety, hip pain)    Pain:  Pain Assessment  Pain Assessment: 0-10  0-10 (Numeric) Pain Score: 6  Pain Type: Chronic pain  Pain Location: Neck    Treatments    Therapeutic Exercise  Therapeutic Exercise Performed: Yes  Therapeutic Exercise Activity 1: supine LTR stretch 10\"x10 reps R and L  Therapeutic Exercise Activity 2: supine bridges x 20 reps  Therapeutic Exercise Activity 3: supine diaphragmatic breathing x 10 breaths  Therapeutic Exercise Activity 4: supine marching x 20 reps  Therapeutic Exercise Activity 5: supine heel walk x 20 reps  Therapeutic Exercise Activity 6: supine reverse crunch x 20 reps  Therapeutic Exercise Activity 7: supine deep cervical flexor strengthening 10\"x10 reps  Therapeutic Exercise Activity 8: supine chin tucks x 20 reps  Therapeutic Exercise Activity 9: supine cervical retraction x 20 reps  Therapeutic Exercise Activity 10: quadruped cat/camel x 20 reps    Treatment  Time in clinic started at:   10:35am  Time in clinic ended at:   11:17am  Total time in clinic is:   42 minutes  Total timed code time is:  38 minutes    Treatment Performed Today:.   Therapeutic Exercise x 3 units  "

## 2025-04-16 ENCOUNTER — APPOINTMENT (OUTPATIENT)
Dept: OTOLARYNGOLOGY | Facility: CLINIC | Age: 70
End: 2025-04-16
Payer: MEDICARE

## 2025-04-16 DIAGNOSIS — J32.9 CHRONIC SINUSITIS, UNSPECIFIED LOCATION: ICD-10-CM

## 2025-04-16 DIAGNOSIS — J01.41 ACUTE RECURRENT PANSINUSITIS: Primary | ICD-10-CM

## 2025-04-16 PROCEDURE — 1159F MED LIST DOCD IN RCRD: CPT | Performed by: OTOLARYNGOLOGY

## 2025-04-16 PROCEDURE — 1123F ACP DISCUSS/DSCN MKR DOCD: CPT | Performed by: OTOLARYNGOLOGY

## 2025-04-16 PROCEDURE — 1160F RVW MEDS BY RX/DR IN RCRD: CPT | Performed by: OTOLARYNGOLOGY

## 2025-04-16 PROCEDURE — 99203 OFFICE O/P NEW LOW 30 MIN: CPT | Performed by: OTOLARYNGOLOGY

## 2025-04-16 NOTE — PROGRESS NOTES
Subjective   Patient ID: Aaron Gilbert is a 70 y.o. female who presents for No chief complaint on file.    HPI  New patient being seen for recurrent sinusitis. Has had sinus problems for many years, with 3 nasal and sinus surgeries previously done with ENT in [place] (most recently about 10 years ago). 3 sinus infections in past year requiring antibiotics. Experiences daily frontal headaches, bilateral maxillary sinus pressure. Uses Nasacort daily, also takes Xyzal and Singulair. All remaining head neck inquiry otherwise negative.     Review of Systems   Constitutional: Negative.    HENT: maxillary sinus pressure, frontal sinus headaches   Respiratory: Negative.     Cardiovascular: Negative.    Neurological: Negative.      Physical Exam  General appearance: No acute distress. Normal facies. Symmetric facial movement. No gross lesions of the face are noted.  Ears:  The external ear structures appear normal. The ear canals patent and the tympanic membranes are intact without evidence of air-fluid levels, retraction, or congenital defects.    Nose:  Anterior rhinoscopy notes essentially a midline nasal septum. Examination is noted for normal healthy mucosal membranes without any evidence of lesions, polyps, or exudate.   Throat/Oral mucosa:  The tongue is normally mobile. There are no lesions on the gingiva, buccal, or oral mucosa. There are no oral cavity masses.  Neck:  The neck is negative for mass lymphadenopathy. The trachea and parotid are clear. The thyroid bed is grossly unremarkable. The salivary gland structures are grossly unremarkable.    CT Sinus:  Completed 3/25/25 shows prominent left maxillary polyp. Other sinuses are relatively clear.     Assessment/Plan   Acute recurrent pansinusitis. Will order Immune Bloodwork and call patient to discuss results and plan moving forward at that point.

## 2025-04-20 LAB
C DIPHTHERIAE AB SER IA-ACNC: <0.1 IU/ML
C TETANI TOXOID AB SER-ACNC: 0.93 IU/ML
IGA SERPL-MCNC: 108 MG/DL (ref 70–320)
IGG SERPL-MCNC: 751 MG/DL (ref 600–1540)
IGG1 SER-MCNC: 429 MG/DL (ref 382–929)
IGG2 SER-MCNC: 252 MG/DL (ref 241–700)
IGG3 SER-MCNC: 33 MG/DL (ref 22–178)
IGG4 SER-MCNC: 18.2 MG/DL (ref 4–86)
IGM SERPL-MCNC: 87 MG/DL (ref 50–300)
S PN DA SERO 19F IGG SER-MCNC: NORMAL UG/ML
S PNEUM DA 1 IGG SER-MCNC: NORMAL UG/ML
S PNEUM DA 10A IGG SER-MCNC: NORMAL UG/ML
S PNEUM DA 11A IGG SER-MCNC: NORMAL UG/ML
S PNEUM DA 12F IGG SER-MCNC: NORMAL UG/ML
S PNEUM DA 14 IGG SER-MCNC: NORMAL
S PNEUM DA 15B IGG SER-MCNC: NORMAL UG/ML
S PNEUM DA 17F IGG SER-MCNC: NORMAL UG/ML
S PNEUM DA 18C IGG SER-MCNC: NORMAL
S PNEUM DA 19A IGG SER-MCNC: NORMAL UG/ML
S PNEUM DA 2 IGG SER-MCNC: NORMAL UG/ML
S PNEUM DA 20A IGG SER-MCNC: NORMAL UG/ML
S PNEUM DA 22F IGG SER-MCNC: NORMAL UG/ML
S PNEUM DA 23F IGG SER-MCNC: NORMAL UG/ML
S PNEUM DA 3 IGG SER-MCNC: NORMAL UG/ML
S PNEUM DA 33F IGG SER-MCNC: NORMAL UG/ML
S PNEUM DA 4 IGG SER-MCNC: NORMAL UG/ML
S PNEUM DA 5 IGG SER-MCNC: NORMAL UG/ML
S PNEUM DA 6B IGG SER-MCNC: NORMAL UG/ML
S PNEUM DA 7F IGG SER-MCNC: NORMAL UG/ML
S PNEUM DA 8 IGG SER-MCNC: NORMAL UG/ML
S PNEUM DA 9N IGG SER-MCNC: NORMAL UG/ML
S PNEUM DA 9V IGG SER-MCNC: NORMAL UG/ML

## 2025-04-22 ENCOUNTER — APPOINTMENT (OUTPATIENT)
Dept: PHYSICAL THERAPY | Facility: CLINIC | Age: 70
End: 2025-04-22
Payer: MEDICARE

## 2025-04-22 DIAGNOSIS — M54.2 CERVICALGIA: ICD-10-CM

## 2025-04-22 DIAGNOSIS — M48.062 SPINAL STENOSIS, LUMBAR REGION WITH NEUROGENIC CLAUDICATION: ICD-10-CM

## 2025-04-22 DIAGNOSIS — M96.1 POSTLAMINECTOMY SYNDROME, NOT ELSEWHERE CLASSIFIED: ICD-10-CM

## 2025-04-23 LAB
C DIPHTHERIAE AB SER IA-ACNC: <0.1 IU/ML
C TETANI TOXOID AB SER-ACNC: 0.93 IU/ML
IGA SERPL-MCNC: 108 MG/DL (ref 70–320)
IGG SERPL-MCNC: 751 MG/DL (ref 600–1540)
IGG1 SER-MCNC: 429 MG/DL (ref 382–929)
IGG2 SER-MCNC: 252 MG/DL (ref 241–700)
IGG3 SER-MCNC: 33 MG/DL (ref 22–178)
IGG4 SER-MCNC: 18.2 MG/DL (ref 4–86)
IGM SERPL-MCNC: 87 MG/DL (ref 50–300)
S PN DA SERO 19F IGG SER-MCNC: 0.9 UG/ML
S PNEUM DA 1 IGG SER-MCNC: <0.3 UG/ML
S PNEUM DA 10A IGG SER-MCNC: <0.3 UG/ML
S PNEUM DA 11A IGG SER-MCNC: 0.5 UG/ML
S PNEUM DA 12F IGG SER-MCNC: 2.5 UG/ML
S PNEUM DA 14 IGG SER-MCNC: 1.1
S PNEUM DA 15B IGG SER-MCNC: <0.3 UG/ML
S PNEUM DA 17F IGG SER-MCNC: 1.3 UG/ML
S PNEUM DA 18C IGG SER-MCNC: 1.9
S PNEUM DA 19A IGG SER-MCNC: 2.1 UG/ML
S PNEUM DA 2 IGG SER-MCNC: 0.5 UG/ML
S PNEUM DA 20A IGG SER-MCNC: 0.7 UG/ML
S PNEUM DA 22F IGG SER-MCNC: <0.3 UG/ML
S PNEUM DA 23F IGG SER-MCNC: 1.4 UG/ML
S PNEUM DA 3 IGG SER-MCNC: <0.3 UG/ML
S PNEUM DA 33F IGG SER-MCNC: <0.3 UG/ML
S PNEUM DA 4 IGG SER-MCNC: <0.3 UG/ML
S PNEUM DA 5 IGG SER-MCNC: <0.3 UG/ML
S PNEUM DA 6B IGG SER-MCNC: 1.1 UG/ML
S PNEUM DA 7F IGG SER-MCNC: <0.3 UG/ML
S PNEUM DA 8 IGG SER-MCNC: <0.3 UG/ML
S PNEUM DA 9N IGG SER-MCNC: 0.3 UG/ML
S PNEUM DA 9V IGG SER-MCNC: 0.8 UG/ML

## 2025-04-28 ENCOUNTER — APPOINTMENT (OUTPATIENT)
Dept: CARDIOLOGY | Facility: CLINIC | Age: 70
End: 2025-04-28
Payer: MEDICARE

## 2025-04-29 ENCOUNTER — APPOINTMENT (OUTPATIENT)
Dept: PHYSICAL THERAPY | Facility: CLINIC | Age: 70
End: 2025-04-29
Payer: MEDICARE

## 2025-04-29 DIAGNOSIS — M96.1 POSTLAMINECTOMY SYNDROME, NOT ELSEWHERE CLASSIFIED: ICD-10-CM

## 2025-04-29 DIAGNOSIS — M48.062 SPINAL STENOSIS, LUMBAR REGION WITH NEUROGENIC CLAUDICATION: ICD-10-CM

## 2025-04-29 DIAGNOSIS — M54.2 CERVICALGIA: ICD-10-CM

## 2025-04-30 ENCOUNTER — TREATMENT (OUTPATIENT)
Dept: PHYSICAL THERAPY | Facility: CLINIC | Age: 70
End: 2025-04-30
Payer: MEDICARE

## 2025-04-30 DIAGNOSIS — M48.062 SPINAL STENOSIS, LUMBAR REGION WITH NEUROGENIC CLAUDICATION: ICD-10-CM

## 2025-04-30 DIAGNOSIS — M50.30 OTHER CERVICAL DISC DEGENERATION, UNSPECIFIED CERVICAL REGION: Primary | ICD-10-CM

## 2025-04-30 DIAGNOSIS — J30.2 SEASONAL ALLERGIES: ICD-10-CM

## 2025-04-30 DIAGNOSIS — M54.2 CERVICALGIA: ICD-10-CM

## 2025-04-30 DIAGNOSIS — M96.1 POSTLAMINECTOMY SYNDROME, NOT ELSEWHERE CLASSIFIED: ICD-10-CM

## 2025-04-30 PROCEDURE — 97110 THERAPEUTIC EXERCISES: CPT | Mod: GP,CQ

## 2025-04-30 RX ORDER — MONTELUKAST SODIUM 10 MG/1
10 TABLET ORAL DAILY
Qty: 90 TABLET | Refills: 3 | Status: SHIPPED | OUTPATIENT
Start: 2025-04-30

## 2025-04-30 ASSESSMENT — PAIN - FUNCTIONAL ASSESSMENT: PAIN_FUNCTIONAL_ASSESSMENT: 0-10

## 2025-04-30 ASSESSMENT — PAIN SCALES - GENERAL
PAINLEVEL_OUTOF10: 8
PAINLEVEL_OUTOF10: 7

## 2025-04-30 NOTE — PROGRESS NOTES
"PHYSICAL THERAPY TREATMENT    Patient name:  Aaron Gilbert    MRN:  [id number]    :  1955    Today's Date:  25    Time Calculation  Start Time: 930  Stop Time:   Time Calculation (min): 44 min     PT Therapeutic Procedures Time Entry  Therapeutic Exercise Time Entry: 40       Referral by:  Referred By: Debi Bradley PA-C    Diagnoses:  Diagnosis and Precautions: M54.2 (ICD-10-CM) - Cervicalgia  M50.30 (ICD-10-CM) - Other cervical disc degeneration, unspecified cervical region  M48.062 (ICD-10-CM) - Spinal stenosis, lumbar region with neurogenic claudication  M96.1 (ICD-10-CM) - Postlaminectomy syndrome, not elsewhere classified    Assessment/Plan:  Therapeutic exercise performed in order to improve neck/core strength/ROM/mobility. Increased HEP to increase shoulder and postural strength. Patient requires increased tactile/verbal cues with exercise in order to reduce cervical/lumbar spine stress/strain during the particular exercise performed.  Pt reports 4/10 neck and 5/10 LBP after tx: \"it feels a little better\". Patient would benefit from P.T. to continue to address impairments in order to improve strength, flexibility, posture, and  to decrease symptoms and increase overall function.     PT Assessment  Evaluation/Treatment Tolerance: Patient limited by pain, Patient limited by fatigue  OP PT Plan  PT Plan: Skilled PT    General Visit Information       General  Reason for Referral: PT Evaluation  Referred By: Debi Bradley PA-C  General Comment: M54.2 (ICD-10-CM) - Cervicalgia  M50.30 (ICD-10-CM) - Other cervical disc degeneration, unspecified cervical region  M48.062 (ICD-10-CM) - Spinal stenosis, lumbar region with neurogenic claudication  M96.1 (ICD-10-CM) - Postlaminectomy syndrome, not elsewhere classified    Insurance  Insurance reviewed  Name of Insurance:  Medicare  Visit No.    CERVICALGIA M54.2; CERVICAL DISC DEGENERATION M50.30; SPINAL STENOSIS LUMBAR WITH NEUROGENIC " "CLAUDICATION M48.062; POSTLAMINECTOMY SYNDROME M96.1; MEDICARE AB / [employer]: 20% COINSUR // 257 DED // UNLIMITED V BMN // KX MOD AFTER 20TH V // 0 OOP // [employer] IS SEC TO MEDICARE AND WILL COV PART B COINSUR AFTER DED IS MET [id number]     Subjective:  Patient reports that her neck and back hurt a little more today for no known reason.    Precautions  Medical Precautions:  (HTN, Back surgery 30 years ago, OA, headaches, anxiety, hip pain)    Pain:  Pain Assessment  Pain Assessment: 0-10  0-10 (Numeric) Pain Score: 7  Pain Type: Chronic pain  Pain Location: Neck    Treatments  UBE f/r L1 2'ea alt  Doorway Pec Stretch 3x30\"  UT Shrug 4# 2x15  Pulleys  Midrow GTT x15  LAE GTT x15  Shoulder IR/ER GTT/RTT 2x10ea  supine LTR stretch 10\"x10 reps R and L  supine bridges w/hip abd GTB x 15 reps  supine diaphragmatic breathing x 10 breaths  supine DLS w/KO x 15 reps  supine heel walk x 20 reps  supine reverse crunch x 20 reps NT  Hip Fallouts x20ea  supine deep cervical flexor strengthening 10\"x10 reps nt  supine chin tucks x 20 reps nt  supine cervical retraction x 20 reps nt  Quadruped cat/camel x 20 reps  Hamstring Stretch B Longsit 3x30\"ea    HEP Issued and reviewed written HEP of:   Access Code: [email or web address]  URL: [email or web address]  Date: 03/13/2025  - Supine Lower Trunk Rotation  - 1 x daily - 3-4 x weekly - 1 sets - 10 reps - 10 hold  - Supine Single Knee to Chest Stretch  - 1 x daily - 3-4 x weekly - 1 sets - 5 reps - 30 hold  - Supine Double Knee to Chest  - 1 x daily - 3-4 x weekly - 1 sets - 5 reps - 30 hold  - Supine Bridge  - 1 x daily - 3-4 x weekly - 2 sets - 10 reps - 5-10 hold  - Supine Posterior Pelvic Tilt  - 1 x daily - 3-4 x weekly - 2 sets - 10 reps - 5-10 hold  - Supine March  - 1 x daily - 3-4 x weekly - 2 sets - 10 reps - 5-10 hold  - Hooklying Heel Walk  - 1 x daily - 3-4 x weekly - 2 sets - 10 reps - 5-10 hold  - Bilateral Bent Leg Lift  - 1 x daily - 3-4 x " weekly - 2 sets - 10 reps - 5-10 hold  - Seated Levator Scapulae Stretch  - 1 x daily - 4-5 x weekly - 1 sets - 5 reps - 30 hold  - Seated Upper Trapezius Stretch  - 1 x daily - 4-5 x weekly - 1 sets - 5 reps - 30 hold  - Seated Scalene Stretch with Towel  - 1 x daily - 4-5 x weekly - 1 sets - 5 reps - 30 hold  - Supine Chin Tuck  - 1 x daily - 3-4 x weekly - 2 sets - 10 reps - 5  hold  - Supine Deep Neck Flexor Training - Repetitions  - 1 x daily - 3-4 x weekly - 1 sets - 10 reps - 10 hold    Access Code: [email or web address]  URL: [email or web address]  Date: 04/30/2025  - Standing Shoulder Shrugs with Dumbbells  - 1 x daily - 7 x weekly - 3 sets - 10 reps  - Standing Shoulder Row with Anchored Resistance  - 1 x daily - 5 x weekly - 2 sets - 15 reps  - Shoulder extension with resistance - Neutral  - 1 x daily - 5 x weekly - 2 sets - 15 reps  - Shoulder Internal Rotation with Resistance  - 1 x daily - 5 x weekly - 2 sets - 15 reps  - Shoulder External Rotation with Anchored Resistance  - 1 x daily - 5 x weekly - 2 sets - 15 reps      Goals:   1. Pain Management  Goal: Within 2-3  weeks, the patient will report a decrease in neck/lower back pain from 5/10 to 2-3/10 or less during daily activities (e.g., turning head while driving, using a phone), as measured by a pain rating scale.     2. Strength and Endurance  Goal: Within 6-7 weeks, the patient’s deep cervical flexor endurance will improve (e.g., able to hold a chin tuck for 20 seconds without loss of form or pain above 2/10), promoting better head and neck stability during upright activities.     3. Neuromuscular Control, Proprioception, and Coordination  Goal: Within 4 weeks, the patient will demonstrate improved cervical proprioception by accurately repositioning the head within 2° of neutral (as measured by a laser pointer or CROM device) and maintaining a neutral spine during functional tasks.     4. Functional Activities / ADLs  Goal: Within 4  weeks, the patient will independently perform daily tasks (e.g., reading, computer work, household chores) for 30-minute intervals without increasing neck/lower back pain above 2/10 or adopting faulty postures (e.g., forward head).     5. Posture and Ergonomics  Goal: Within 3 weeks, the patient will implement recommended ergonomic adjustments (e.g., proper monitor height, lumbar support) and maintain neutral cervical alignment at least 80% of the workday, as monitored by periodic self-checks or therapist observation.     6. Education, Self-Management, and Compliance  Goal: By the second therapy visit, the patient will demonstrate understanding of home exercise program (HEP) for cervical/lumbar mobility and posture correction, accurately perform each exercise with proper technique, and document compliance in a daily log.     7. Palpation and Tissue Healing  Goal: Within 2 weeks, the patient will demonstrate reduced tenderness to palpation over the upper trapezius and cervical paraspinals from 5/10 to 2/10, indicating decreased muscle tension and inflammation.     8. Long-Term Maintenance and Prevention  Goal: By discharge (approximately 7 weeks), the patient will establish a regular cervical/lumbar spine exercise routine (e.g., daily stretches, postural breaks, strength exercises) with no significant pain flare-ups (>3/10) during normal daily or recreational activities.

## 2025-04-30 NOTE — TELEPHONE ENCOUNTER
Rx Refill Request     Name: Aaron Gilbert  :  1955   Medication Name:  montelukast (Singulair) 10 mg tablet   Specific Pharmacy location:  [facility] [address] [place] [address]   Date of last appointment:  3/28/2025  Date of next appointment:  2025  Best number to reach patient:  [phone]

## 2025-05-01 ENCOUNTER — OFFICE VISIT (OUTPATIENT)
Dept: CARDIOLOGY | Facility: CLINIC | Age: 70
End: 2025-05-01
Payer: MEDICARE

## 2025-05-01 VITALS
DIASTOLIC BLOOD PRESSURE: 74 MMHG | WEIGHT: 162.5 LBS | SYSTOLIC BLOOD PRESSURE: 122 MMHG | BODY MASS INDEX: 28.79 KG/M2 | HEART RATE: 92 BPM | HEIGHT: 63 IN

## 2025-05-01 DIAGNOSIS — R00.2 PALPITATIONS: ICD-10-CM

## 2025-05-01 DIAGNOSIS — R00.1 BRADYCARDIA: ICD-10-CM

## 2025-05-01 DIAGNOSIS — I10 ESSENTIAL (PRIMARY) HYPERTENSION: ICD-10-CM

## 2025-05-01 DIAGNOSIS — R06.02 SHORTNESS OF BREATH: ICD-10-CM

## 2025-05-01 PROCEDURE — 1036F TOBACCO NON-USER: CPT | Performed by: INTERNAL MEDICINE

## 2025-05-01 PROCEDURE — 99214 OFFICE O/P EST MOD 30 MIN: CPT | Performed by: INTERNAL MEDICINE

## 2025-05-01 PROCEDURE — 3078F DIAST BP <80 MM HG: CPT | Performed by: INTERNAL MEDICINE

## 2025-05-01 PROCEDURE — 1123F ACP DISCUSS/DSCN MKR DOCD: CPT | Performed by: INTERNAL MEDICINE

## 2025-05-01 PROCEDURE — 3008F BODY MASS INDEX DOCD: CPT | Performed by: INTERNAL MEDICINE

## 2025-05-01 PROCEDURE — 1159F MED LIST DOCD IN RCRD: CPT | Performed by: INTERNAL MEDICINE

## 2025-05-01 PROCEDURE — 3074F SYST BP LT 130 MM HG: CPT | Performed by: INTERNAL MEDICINE

## 2025-05-01 NOTE — PROGRESS NOTES
Patient:  Aaron Gilbert  YOB: 1955  MRN: [id number]     Chief complaint:   Chief Complaint   Patient presents with    Follow-up     Pt here for tachycardia and HTN management.        Chief Complaint/Active Symptoms:       Aaron Gilbert is a 70 y.o. female who returns today for cardiac follow-up.  Patient had see me originally because of asymptomatic sinus bradycardia.  We did not put her through any studies since there were no particular problems or issues from a clinical standpoint.  She is here for return visit early.  She has been noticing that she is having some tachycardia at times.  It causes her to be a little out of breath and has some chest pressure.  Historically she has had hypertension and chronic palpitations.  She is also had a history of some GERD.  Under the circumstances have advised we do basic noninvasive studies.  This will include a 2-week monitor stress perfusion and echo.  Also will obtain a screening calcium score.  I reviewed this in detail and she is agreeable to proceed in this direction.      Objective:     Vitals:    05/01/25 1250   BP: 122/74   Pulse: 92       Vitals:    05/01/25 1250   Weight: 73.7 kg (162 lb 8 oz)       Allergies:     Allergies[1]       Medications:     Current Outpatient Medications   Medication Instructions    busPIRone (BUSPAR) 15 mg, oral, 3 times daily    celecoxib (CELEBREX) 100 mg, 2 times daily    cyclobenzaprine (FLEXERIL) 10 mg, oral, Nightly    estradiol (Estrace) 0.01 % (0.1 mg/gram) vaginal cream APPLY A PEA-SIZED AMOUNT DAILY    FLUoxetine (PROZAC) 10 mg, oral, Daily    FLUoxetine (PROZAC) 20 mg, oral, Daily    fluticasone (Flonase) 50 mcg/actuation nasal spray 2 sprays, Each Nostril, Daily, Shake gently. Before first use, prime pump. After use, clean tip and replace cap. Use one hour before bedtime.    indapamide (LOZOL) 1.25 mg, oral, Every morning    levocetirizine (Xyzal) 5 mg tablet Take by mouth.    montelukast (SINGULAIR) 10 mg,  oral, Daily    pantoprazole (PROTONIX) 40 mg, oral, Daily before breakfast    sucralfate (CARAFATE) 1 g, oral, 3 times daily before meals    valsartan (DIOVAN) 160 mg, oral, Daily       Physical Examination:   Constitutional:       Appearance: Healthy appearance. Not in distress.   Neck:      Vascular: No JVR. JVD normal.   Pulmonary:      Effort: Pulmonary effort is normal.      Breath sounds: Normal breath sounds. No wheezing. No rhonchi. No rales.   Chest:      Chest wall: Not tender to palpatation.   Cardiovascular:      PMI at left midclavicular line. Normal rate. Regular rhythm. Normal S1. Normal S2.       Murmurs: There is no murmur.      No gallop.  No click. No rub.   Pulses:     Intact distal pulses.   Edema:     Peripheral edema absent.   Abdominal:      General: Bowel sounds are normal.      Palpations: Abdomen is soft.      Tenderness: There is no abdominal tenderness.   Musculoskeletal: Normal range of motion.         General: No tenderness. Skin:     General: Skin is warm and dry.   Neurological:      General: No focal deficit present.      Mental Status: Alert and oriented to person, place and time.            Lab:     CBC:   Lab Results   Component Value Date    WBC 5.9 10/01/2024    RBC 4.09 10/01/2024    HGB 13.0 10/01/2024    HCT 38.8 10/01/2024     10/01/2024        CMP:    Lab Results   Component Value Date     10/01/2024    K 3.7 10/01/2024     10/01/2024    CO2 26 10/01/2024    BUN 13 10/01/2024    CREATININE 0.78 10/01/2024    GLUCOSE 78 10/01/2024    CALCIUM 8.9 10/01/2024       Magnesium:    Lab Results   Component Value Date    MG 1.87 10/01/2024       Lipid Profile:    Lab Results   Component Value Date    TRIG 70 07/28/2023    HDL 56.6 07/28/2023       TSH:    Lab Results   Component Value Date    TSH 0.87 09/10/2024       BNP:   Lab Results   Component Value Date    BNP 32 10/01/2024        PT/INR:    Lab Results   Component Value Date    PROTIME 12.0 10/01/2024     INR 1.1 10/01/2024       HgBA1c:    Lab Results   Component Value Date    HGBA1C 5.1 01/10/2022       BMP:  Lab Results   Component Value Date     10/01/2024     09/10/2024     (L) 07/04/2024    K 3.7 10/01/2024    K 4.2 09/10/2024    K 3.2 (L) 07/04/2024     10/01/2024     09/10/2024     07/04/2024    CO2 26 10/01/2024    CO2 28 09/10/2024    CO2 24 07/04/2024    BUN 13 10/01/2024    BUN 10 09/10/2024    BUN 21 07/04/2024    CREATININE 0.78 10/01/2024    CREATININE 0.81 09/10/2024    CREATININE 0.81 07/04/2024       CBC:  Lab Results   Component Value Date    WBC 5.9 10/01/2024    WBC 4.4 09/10/2024    WBC 9.3 07/04/2024    RBC 4.09 10/01/2024    RBC 4.05 09/10/2024    RBC 3.94 (L) 07/04/2024    HGB 13.0 10/01/2024    HGB 12.9 09/10/2024    HGB 12.8 07/04/2024    HCT 38.8 10/01/2024    HCT 40.8 09/10/2024    HCT 36.9 07/04/2024    MCV 95 10/01/2024     (H) 09/10/2024    MCV 94 07/04/2024    MCH 31.8 10/01/2024    MCH 31.9 09/10/2024    MCH 32.5 07/04/2024    MCHC 33.5 10/01/2024    MCHC 31.6 (L) 09/10/2024    MCHC 34.7 07/04/2024    RDW 12.5 10/01/2024    RDW 13.1 09/10/2024    RDW 12.6 07/04/2024     10/01/2024     09/10/2024     07/04/2024       Cardiac Enzymes:    Lab Results   Component Value Date    TROPHS <3 10/01/2024    TROPHS <3 10/01/2024    TROPHS 4 07/05/2024       Hepatic Function Panel:    Lab Results   Component Value Date    ALKPHOS 56 10/01/2024    ALT 8 10/01/2024    AST 12 10/01/2024    PROT 6.4 10/01/2024    BILITOT 1.1 10/01/2024    BILIDIR 0.2 07/28/2023         Diagnostic Studies:     CT sinus wo IV contrast  Result Date: 3/31/2025  Interpreted By:  Juan M Tapia, STUDY: CT paranasal sinuses   INDICATION: Signs/Symptoms:chronic sinusitis,J32.9 Chronic sinusitis, unspecified   COMPARISON: none   ACCESSION NUMBER(S): [id number]   ORDERING CLINICIAN: STEVE CAUSEY   TECHNIQUE: CT of the paranasal sinuses was performed with  "multiplanar reconstruction and 3D reconstruction   FINDINGS: *Nasal fossa the nasal septum is in the midline. Right-sided cris bullosa with slight narrowing of the middle meatus. *Frontal sinuses: Normal *Ethmoid sinuses:  Normal *Maxillary sinuses:  3 cm retention cyst or polyp left maxillary sinus retained fluid in the left maxillary sinus is not excluded. *Sphenoid sinuses:  Normal *Mastoid sinuses: Normal *Maxilla and mandible: Normal ORBITS Normal       Polypoid mucosal thickening and/or retained fluid in the left maxillary sinus   Intranasal anatomic variations as described   MACRO: none   Signed by: Juan M Tapia 3/31/2025 9:31 AM Dictation workstation:   [id number]      EKG:   No results found for: \"EKG\"      Radiology:     Transthoracic Echo Complete    (Results Pending)   CT cardiac scoring wo IV contrast    (Results Pending)   Nuclear Stress Test    (Results Pending)   Holter or Event Cardiac Monitor    (Results Pending)       Problem List:     Patient Active Problem List   Diagnosis    Abdominal bloating    Abdominal pain    Abdominal weakness    Achilles tendinitis of right lower extremity    Anxiety    Atrophy of vagina    Bursitis of hip    Degenerative arthritis of lumbar spine    Depression    Hypokalemia    Irritable bowel syndrome with predominant constipation    Left knee pain    IT band syndrome    Lower back pain    Postural instability of trunk    Recurrent UTI    Rotator cuff tear    Shoulder impingement    Spondylolisthesis    Trochanteric bursitis of left hip    Urinary urgency    Vaginal hemorrhage    Seasonal allergies    Sacroiliitis (CMS-HCC)    Primary osteoarthritis involving multiple joints    Palpitations    Kidney stones    Hypertrophy of nasal turbinates    Gastroesophageal reflux disease without esophagitis    Appendicitis    Overweight with body mass index (BMI) of 29 to 29.9 in adult    Dysuria    Essential (primary) hypertension    History of Helicobacter pylori " infection    History of hypertension    Personal history of COVID-19    Impacted cerumen of right ear    Vision disorder    Weakness    Acute pain of right shoulder    Nonintractable headache    Cervicalgia    Migraine without aura and without status migrainosus, not intractable    Bradycardia    Extensor carpi ulnaris tendinitis    Other cervical disc degeneration, unspecified cervical region    Spinal stenosis, lumbar region with neurogenic claudication    Postlaminectomy syndrome, not elsewhere classified    Acute recurrent pansinusitis    Shortness of breath         ASSESSMENT       70-year-old female here for routine follow-up and complaints of palpitations associated with dyspnea.    Meds, vitals, examination as noted.    Chart review details cussed with the patient at length.    Pression:  Exertional dyspnea  Palpitations  Hypertension  Irritable bowel  GERD  History of sinus bradycardia      PLAN     Recommendation:  Continue usual meds  Schedule stress perfusion echo calcium score test and 2-week monitor  See me afterwards to review  Call if any problems or issues otherwise develop  Follow-up with primary care  Continue usual exercise and activities for now    I, Penny Obrien RN  am scribing for, and in the presence of Dr. Doron Meza DO .    I, Dr. Doron Meza DO , personally performed the services described in the documentation as scribed by Penny Obrien RN  in my presence, and confirm it is both accurate and complete.    Dr. Doron Meza DO  Thank you for allowing me to participate in the care of this patient. Please do not hesitate to contact me with any further questions or concerns.           [1] No Known Allergies

## 2025-05-01 NOTE — PATIENT INSTRUCTIONS
Continue same medications/treatment.  Patient educated on proper medication use.  Patient educated on risk factor modification.  Please bring any lab results from other providers/physicians to your next appointment.    Please bring all medicines, vitamins, and herbal supplements with you when you come to the office.    Prescriptions will not be filled unless you are compliant with your follow up appointments or have a follow up appointment scheduled as per instruction of your physician. Refills should be requested at the time of your visit.    Follow up after testing  ECHO, calcium score testing, Ziopatch monitor and MPX stress test to be done    IHAYLIE RN, AM SCRIBING FOR AND IN THE PRESENCE OF DR. SHILA RICHARDSON, DO, FACC

## 2025-05-05 ENCOUNTER — APPOINTMENT (OUTPATIENT)
Dept: PRIMARY CARE | Facility: CLINIC | Age: 70
End: 2025-05-05
Payer: MEDICARE

## 2025-05-05 VITALS
OXYGEN SATURATION: 99 % | RESPIRATION RATE: 16 BRPM | TEMPERATURE: 97.3 F | WEIGHT: 166.6 LBS | DIASTOLIC BLOOD PRESSURE: 62 MMHG | HEART RATE: 80 BPM | SYSTOLIC BLOOD PRESSURE: 104 MMHG | BODY MASS INDEX: 29.52 KG/M2 | HEIGHT: 63 IN

## 2025-05-05 DIAGNOSIS — I10 ESSENTIAL (PRIMARY) HYPERTENSION: Primary | ICD-10-CM

## 2025-05-05 DIAGNOSIS — Z23 NEED FOR PNEUMOCOCCAL 20-VALENT CONJUGATE VACCINATION: ICD-10-CM

## 2025-05-05 DIAGNOSIS — M76.891 HIP ABDUCTOR TENDINITIS, RIGHT: ICD-10-CM

## 2025-05-05 DIAGNOSIS — M70.71 BURSITIS OF RIGHT HIP, UNSPECIFIED BURSA: ICD-10-CM

## 2025-05-05 DIAGNOSIS — J30.2 SEASONAL ALLERGIES: ICD-10-CM

## 2025-05-05 PROCEDURE — 3078F DIAST BP <80 MM HG: CPT | Performed by: FAMILY MEDICINE

## 2025-05-05 PROCEDURE — 1123F ACP DISCUSS/DSCN MKR DOCD: CPT | Performed by: FAMILY MEDICINE

## 2025-05-05 PROCEDURE — 90677 PCV20 VACCINE IM: CPT | Performed by: FAMILY MEDICINE

## 2025-05-05 PROCEDURE — G2211 COMPLEX E/M VISIT ADD ON: HCPCS | Performed by: FAMILY MEDICINE

## 2025-05-05 PROCEDURE — 3008F BODY MASS INDEX DOCD: CPT | Performed by: FAMILY MEDICINE

## 2025-05-05 PROCEDURE — 1036F TOBACCO NON-USER: CPT | Performed by: FAMILY MEDICINE

## 2025-05-05 PROCEDURE — 99214 OFFICE O/P EST MOD 30 MIN: CPT | Performed by: FAMILY MEDICINE

## 2025-05-05 PROCEDURE — 3074F SYST BP LT 130 MM HG: CPT | Performed by: FAMILY MEDICINE

## 2025-05-05 PROCEDURE — G0009 ADMIN PNEUMOCOCCAL VACCINE: HCPCS | Performed by: FAMILY MEDICINE

## 2025-05-05 PROCEDURE — 1159F MED LIST DOCD IN RCRD: CPT | Performed by: FAMILY MEDICINE

## 2025-05-05 RX ORDER — CYCLOBENZAPRINE HCL 10 MG
10 TABLET ORAL NIGHTLY PRN
Qty: 10 TABLET | Refills: 0 | Status: SHIPPED | OUTPATIENT
Start: 2025-05-05 | End: 2025-05-15

## 2025-05-05 ASSESSMENT — ANXIETY QUESTIONNAIRES
4. TROUBLE RELAXING: NOT AT ALL
6. BECOMING EASILY ANNOYED OR IRRITABLE: NOT AT ALL
5. BEING SO RESTLESS THAT IT IS HARD TO SIT STILL: NOT AT ALL
3. WORRYING TOO MUCH ABOUT DIFFERENT THINGS: NOT AT ALL
IF YOU CHECKED OFF ANY PROBLEMS ON THIS QUESTIONNAIRE, HOW DIFFICULT HAVE THESE PROBLEMS MADE IT FOR YOU TO DO YOUR WORK, TAKE CARE OF THINGS AT HOME, OR GET ALONG WITH OTHER PEOPLE: NOT DIFFICULT AT ALL
1. FEELING NERVOUS, ANXIOUS, OR ON EDGE: NOT AT ALL
2. NOT BEING ABLE TO STOP OR CONTROL WORRYING: NOT AT ALL
7. FEELING AFRAID AS IF SOMETHING AWFUL MIGHT HAPPEN: NOT AT ALL
GAD7 TOTAL SCORE: 0

## 2025-05-05 ASSESSMENT — PATIENT HEALTH QUESTIONNAIRE - PHQ9
2. FEELING DOWN, DEPRESSED OR HOPELESS: NOT AT ALL
SUM OF ALL RESPONSES TO PHQ9 QUESTIONS 1 AND 2: 0
1. LITTLE INTEREST OR PLEASURE IN DOING THINGS: NOT AT ALL

## 2025-05-05 ASSESSMENT — ENCOUNTER SYMPTOMS
DEPRESSION: 0
PND: 0
NECK PAIN: 1
PALPITATIONS: 1
ORTHOPNEA: 0
OCCASIONAL FEELINGS OF UNSTEADINESS: 0
HYPERTENSION: 1
SWEATS: 1
SHORTNESS OF BREATH: 0
BLURRED VISION: 0
HEADACHES: 1
LOSS OF SENSATION IN FEET: 0

## 2025-05-05 NOTE — PROGRESS NOTES
Subjective   Patient ID: Aaron Gilbert is a 70 y.o. female who presents for Elevated Readings  . I last saw the patient on 3/26/2025  .     Hypertension  This is a recurrent problem. The current episode started more than 1 year ago. The problem has been waxing and waning since onset. The problem is resistant. Associated symptoms include anxiety, headaches, malaise/fatigue, neck pain, palpitations and sweats. Pertinent negatives include no blurred vision, chest pain, orthopnea, peripheral edema, PND or shortness of breath. Risk factors for coronary artery disease include dyslipidemia and obesity. Compliance problems include exercise.      Patient states that she has been having high BP and HR readings.   Patient states that her avg BP is around 129/90 and states that her HR has been in the 120s.   Patient uses an automatic wrist cuff.  Patient was advised by ENT to get Prevnar-20. Her pneumococcal titers were low.    Patient also mentions having headaches at times. She thinks it can be her sinusitis.    Past medical, surgical, and family history reviewed.  Reviewed and documented all medications   Pt eating well, exercising as tolerated and taking medications as directed.      Review of Systems   Constitutional:  Positive for malaise/fatigue.   Eyes:  Negative for blurred vision.   Respiratory:  Negative for shortness of breath.    Cardiovascular:  Positive for palpitations. Negative for chest pain, orthopnea and PND.   Musculoskeletal:  Positive for neck pain.   Neurological:  Positive for headaches.     Except positives as noted in the CC & HPI      Constitutional: Denies fevers, chills, night sweats, fatigue, weight changes, change in appetite    Eyes: Denies blurry vision, double vision    ENT: Denies otalgia, trouble hearing, tinnitus, vertigo, nasal congestion, rhinorrhea, sore throat    Neck: Denies swelling, masses    Cardiovascular: Denies chest pain, palpitations, edema, orthopnea, syncope    Respiratory:  "Denies dyspnea, cough, wheezing, postural nocturnal dyspnea    Gastrointestinal: Denies abdominal pain, nausea, vomiting, diarrhea, constipation, melena, hematochezia    Genitourinary: Denies dysuria, hematuria  Musculoskeletal: Denies back pain, neck pain, arthralgias, myalgias    Integumentary: Denies skin lesions, rashes, masses    Neurological: Denies dizziness, headaches, confusion, limb weakness, paresthesias, syncope, convulsions    Psychiatric: Denies depression, anxiety, homicidal ideations, suicidal ideations, sleep disturbances    Endocrine: Denies polyphagia, polydipsia, polyuria, weakness, hair thinning, heat intolerance, cold intolerance, weight changes    Heme/Lymph: Denies easy bruising, easy bleeding, swollen glands    Objective   Visit Vitals  /62 (BP Location: Right arm, Patient Position: Sitting, BP Cuff Size: Large adult)   Pulse 80   Temp 36.3 °C (97.3 °F)   Resp 16   Ht 1.6 m (5' 3\")   Wt 75.6 kg (166 lb 9.6 oz)   SpO2 99%   BMI 29.51 kg/m²   Smoking Status Former   BSA 1.83 m²         Physical Exam  Gen. Appearance - well-developed, well-nourished, 69 y.o., White female in no acute distress.      Skin - warm, pink and dry without rash or concerning lesions.      Mental Status - alert and oriented times 3. Normal mood and affect appropriate to mood.      Neck - supple without lymphadenopathy. Carotid pulses are normal without bruits. Thyroid is normal in midline without nodules.     Chest - lungs are clear to auscultation without rales, rhonchi or wheezes.      Heart - regular, rate, and rhythm without murmurs, rubs or gallops.      Extremities - no cyanosis, clubbing or edema. Pedal pulses are 2+ normal at the dorsalis pedis and posterior tibial pulses bilaterally.      Neurological - cranial nerves II through XII are grossly intact. Motor strength 5/5 at all fours.     Assessment   1. Essential (primary) hypertension        2. Bursitis of right hip, unspecified bursa  cyclobenzaprine " (Flexeril) 10 mg tablet      3. Hip abductor tendinitis, right  cyclobenzaprine (Flexeril) 10 mg tablet      4. Seasonal allergies        5. Need for pneumococcal 20-valent conjugate vaccination  Pneumococcal conjugate vaccine, 20-valent (PREVNAR 20)        Patient to continue current medications (with any exceptions as noted) and diet. Follow-up in 4 month(s) otherwise as needed.      Patient was given refill(s) on:   Cyclobenzaprine HCl 10 mg, TAKE 1 TAB BY MOUTH THREE TIMES DAILY AS NEEDED     Rx(s) sent to pharmacy.      Encouraged patient to drink plenty of fluids, at least 64 oz of water daily.      Prevnar 20 vaccine(s) given in the office today.      Patient is to follow up with Dr. Cerna (ENT), pain management, , Dr. Cobb and Dr. Jha as scheduled.     Scribe Attestation  By signing my name below, ILula , Scribe   attest that this documentation has been prepared under the direction and in the presence of Steve Causey MD.      This note has been transcribed using a medical scribe and there is a possibility of unintentional typing misprints.     All medical record entries made by the scribe were at my direction and personally dictated by me. I have reviewed the chart and agree that the record accurately reflects my personal performance of the history, physical exam, discussion, and plan.     STEVE CAUSEY M.D.

## 2025-05-06 ENCOUNTER — TREATMENT (OUTPATIENT)
Dept: PHYSICAL THERAPY | Facility: CLINIC | Age: 70
End: 2025-05-06
Payer: MEDICARE

## 2025-05-06 DIAGNOSIS — M50.30 OTHER CERVICAL DISC DEGENERATION, UNSPECIFIED CERVICAL REGION: Primary | ICD-10-CM

## 2025-05-06 DIAGNOSIS — M48.062 SPINAL STENOSIS, LUMBAR REGION WITH NEUROGENIC CLAUDICATION: ICD-10-CM

## 2025-05-06 DIAGNOSIS — M96.1 POSTLAMINECTOMY SYNDROME, NOT ELSEWHERE CLASSIFIED: ICD-10-CM

## 2025-05-06 DIAGNOSIS — M54.2 CERVICALGIA: ICD-10-CM

## 2025-05-06 PROCEDURE — 97110 THERAPEUTIC EXERCISES: CPT | Mod: GP | Performed by: PHYSICAL THERAPIST

## 2025-05-06 ASSESSMENT — PAIN - FUNCTIONAL ASSESSMENT: PAIN_FUNCTIONAL_ASSESSMENT: 0-10

## 2025-05-06 ASSESSMENT — PAIN SCALES - GENERAL
PAINLEVEL_OUTOF10: 6
PAINLEVEL_OUTOF10: 9

## 2025-05-06 NOTE — PROGRESS NOTES
PHYSICAL THERAPY TREATMENT    Patient name:  Aaron Gilbert    MRN:  [id number]    :  1955    Today's Date:  25    Time Calculation  Start Time: 1130  Stop Time: 1150  Time Calculation (min): 20 min     PT Therapeutic Procedures Time Entry  Therapeutic Exercise Time Entry: 20       Referral by:  Referred By: Debi Bradley PA-C    Diagnoses:  Diagnosis and Precautions: M54.2 (ICD-10-CM) - Cervicalgia  M50.30 (ICD-10-CM) - Other cervical disc degeneration, unspecified cervical region  M48.062 (ICD-10-CM) - Spinal stenosis, lumbar region with neurogenic claudication  M96.1 (ICD-10-CM) - Postlaminectomy syndrome, not elsewhere classified    Goals:  No change in goals below  1. Pain Management  Goal: Within 2-3  weeks, the patient will report a decrease in neck/lower back pain from 5/10 to 2-3/10 or less during daily activities (e.g., turning head while driving, using a phone), as measured by a pain rating scale.    2. Strength and Endurance  Goal: Within 6-7 weeks, the patient’s deep cervical flexor endurance will improve (e.g., able to hold a chin tuck for 20 seconds without loss of form or pain above 2/10), promoting better head and neck stability during upright activities.    3. Neuromuscular Control, Proprioception, and Coordination  Goal: Within 4 weeks, the patient will demonstrate improved cervical proprioception by accurately repositioning the head within 2° of neutral (as measured by a laser pointer or CROM device) and maintaining a neutral spine during functional tasks.    4. Functional Activities / ADLs  Goal: Within 4 weeks, the patient will independently perform daily tasks (e.g., reading, computer work, household chores) for 30-minute intervals without increasing neck/lower back pain above 2/10 or adopting faulty postures (e.g., forward head).    5. Posture and Ergonomics  Goal: Within 3 weeks, the patient will implement recommended ergonomic adjustments (e.g., proper monitor height, lumbar  support) and maintain neutral cervical alignment at least 80% of the workday, as monitored by periodic self-checks or therapist observation.    6. Education, Self-Management, and Compliance  Goal: By the second therapy visit, the patient will demonstrate understanding of home exercise program (HEP) for cervical/lumbar mobility and posture correction, accurately perform each exercise with proper technique, and document compliance in a daily log.    7. Palpation and Tissue Healing  Goal: Within 2 weeks, the patient will demonstrate reduced tenderness to palpation over the upper trapezius and cervical paraspinals from 5/10 to 2/10, indicating decreased muscle tension and inflammation.    8. Long-Term Maintenance and Prevention  Goal: By discharge (approximately 7 weeks), the patient will establish a regular cervical/lumbar spine exercise routine (e.g., daily stretches, postural breaks, strength exercises) with no significant pain flare-ups (>3/10) during normal daily or recreational activities.     Assessment/Plan:  Patient comes into clinic today for PT Re-Evaluation secondary to complaints of neck/lower back pain.  Patient demonstrates limited overall progression with her neck/lumbar rehab thus far due to elevated pain levels.  Patient to follow-up with MD and pursue other treatment options secondary to lack of significant functional progression due to elevated pain levels and patient to be discharged from PT.  PT Assessment  PT Assessment Results: Decreased strength, Decreased range of motion, Decreased endurance, Decreased mobility, Pain  Rehab Prognosis: Good  OP PT Plan  PT Plan: No Additional PT interventions required at this time  PT Frequency:  (DC)  Duration: DC  Rehab Potential: Good  Plan of Care Agreement: Patient    General Visit Information  PT  Visit  PT Received On: 05/06/25    General  Reason for Referral: PT Evaluation  Referred By: Debi Bradley PA-C  General Comment: M54.2 (ICD-10-CM) -  Cervicalgia  M50.30 (ICD-10-CM) - Other cervical disc degeneration, unspecified cervical region  M48.062 (ICD-10-CM) - Spinal stenosis, lumbar region with neurogenic claudication  M96.1 (ICD-10-CM) - Postlaminectomy syndrome, not elsewhere classified    Insurance  Insurance reviewed  Name of Insurance:  Medicare  Visit No.  7  CERVICALGIA M54.2; CERVICAL DISC DEGENERATION M50.30; SPINAL STENOSIS LUMBAR WITH NEUROGENIC CLAUDICATION M48.062; POSTLAMINECTOMY SYNDROME M96.1; MEDICARE AB / [employer]: 20% COINSUR // 257 DED // UNLIMITED V BMN // KX MOD AFTER 20TH V // 0 OOP // [employer] IS SEC TO MEDICARE AND WILL COV PART B COINSUR AFTER DED IS MET [id number]     Subjective:  Patient comes into clinic today for PT Re-Evaluation secondary to complaints of neck/lower back pain. Patient reports that overall her neck/lower back pain feels the same.  Patient reports having no follow-up with MD at this time.    Precautions  Medical Precautions:  (HTN, Back surgery 30 years ago, OA, headaches, anxiety, hip pain)    Pain:  Pain Assessment  Pain Assessment: 0-10  0-10 (Numeric) Pain Score: 6 (10/10 worst, 5/10 least)  Pain Type: Chronic pain  Pain Location: Neck  Lower back pain current 9/10, least 8/10, worst 10/10    Objective:    Weightbearing Status:  FWB    Skin:  skin intact over neck, lumbar surgical incision healed and closed.    Palpation:   point tenderness over bilateral upper trapezius, levator scapula, scalenes, bilateral lumbar paraspinals    Sensation:  Patient denies numbness/tingling of bilateral UPPER and LOWER extremities.    ROM:  AROM  Cervical spine flexion end range pain base of neck  Extension end range pain base of neck  Rotation WNL's R and L  Lumbar AROM  Flexion end range pain  Extension end range pain  Rotation WNL's R and L  SB 15 degrees R and L with pain    Strength:    Bilateral hips 4/5 all planes with pain    Outcome measures  Other Measures  Neck Disability Index: 32%  Oswestry  Disablity Index (COLEEN): 58%    Treatments    Therapeutic Exercise  Therapeutic Exercise Activity 1: Re-Evaluation    Treatment  Time in clinic started at:   11:30am  Time in clinic ended at:   11:50am  Total time in clinic is:   20 minutes  Total timed code time is:  20 minutes    Treatment Performed Today:.   Therapeutic Exercise x 1 unit

## 2025-05-08 ENCOUNTER — EVALUATION (OUTPATIENT)
Dept: PHYSICAL THERAPY | Facility: CLINIC | Age: 70
End: 2025-05-08
Payer: MEDICARE

## 2025-05-08 DIAGNOSIS — M70.71 BURSITIS OF RIGHT HIP, UNSPECIFIED BURSA: ICD-10-CM

## 2025-05-08 DIAGNOSIS — M76.891 HIP ABDUCTOR TENDINITIS, RIGHT: ICD-10-CM

## 2025-05-08 DIAGNOSIS — M70.71 BURSITIS OF RIGHT HIP: Primary | ICD-10-CM

## 2025-05-08 PROCEDURE — 97110 THERAPEUTIC EXERCISES: CPT | Mod: GP

## 2025-05-08 PROCEDURE — 97161 PT EVAL LOW COMPLEX 20 MIN: CPT | Mod: GP

## 2025-05-08 ASSESSMENT — PAIN SCALES - GENERAL: PAINLEVEL_OUTOF10: 10 - WORST POSSIBLE PAIN

## 2025-05-08 ASSESSMENT — PAIN - FUNCTIONAL ASSESSMENT: PAIN_FUNCTIONAL_ASSESSMENT: 0-10

## 2025-05-08 NOTE — PROGRESS NOTES
Physical Therapy     Physical Therapy Evaluation                                        Patient Name: Aaron Gilbert  MRN: [id number]  :  1955  Today's Date: 2025  Time Calculation  Start Time: 931  Stop Time: 958  Time Calculation (min): 27 min  PT Evaluation Time Entry  PT Evaluation (Low) Time Entry: 18  PT Therapeutic Procedures Time Entry  Therapeutic Exercise Time Entry: 9       Reason for Visit  Referred by: Joey Cobb MD  Referral DX date: 25     Diagnosis:  1. Bursitis of right hip    2. Bursitis of right hip, unspecified bursa    3. Hip abductor tendinitis, right        Insurance:  Visit: #1  POC: 8  Authorized: *Pending*  Certification: 2025 to 25  Payor: MEDICARE / Plan: MEDICARE PART A AND B / Product Type: *No Product type* /     Subjective:  Slow progression of R hip bursitis, cortisone injection last year but no change  Current Episode  Date of Onset:  late   Mechanism of injury:  unknown  Chief Complaint:  pain  Relevant incidents/injuries:  OA  Progression of symptoms:  staying same  Previous treatments:  OPPT for neck  Relevant medical history:  OA     Prior level of function: IND  Functional limitations: walking, STS,   Home environment: no steps to enter  Work status/occupation: retired  Recreational activity: walk, housework      Patient stated goals for treatment: walk better     Reviewed medical screening history form with patient.     Precautions: OA  Precautions  STEADI Fall Risk Score (The score of 4 or more indicates an increased risk of falling): 0        Pain:  Location: R hip  Current pain: 10/10; Range: 6-10/10  Aggravating factor: walking STS  Alleviating factor: heat, ice, tylenol      Objective:  Observation/Posture: antalgic gait on L, Wide JULIANE, knee functional valgus bilat     AROM/PROM  Lower Extremity Right Left   Hip Flex  100 WFL   Hip Ext  30    Knee Flex  130    Knee Ext  0    DF  30    PF  40    Hip Abd  40    Hip Add  20           MMT:    Lower Extremity Right Left   Hip Flex 4/5 4/5   Hip Ext Functionally tested with squat due to pt unable to lay prone    Knee Flex 4/5 4/5   Knee Ext 4/5 4/5   DF 4+/5 4+/5   PF 4+/5 4+/5   Hip Abd 4/5 4/5   Hip Add 4/5 4/5       Balance:   Unable to complete tandem due to pain, but semi-tandem 10 sec     Functional testing:   No increase in pain during squat and STS, but reports same pain     Sensation:   Patient denies numbness/tingling of bilateral LOWER extremities.  Light Touch: normal    Coordination:  Heel to shin: normal but painful     Outcome Measure:  Other Measures  30 Second Sit to Stand: 8  Lower Extremity Funtional Score (LEFS): 49/80       Treatment:  Provided education regarding POC, goals created, reasoning for assessments performed and results of those assessments. Additionally, provided education regarding scheduling proposal with pt asked about scheduling preferences, call in/no show policy, and pt provides verbal confirmation of understanding.  Clamshell with Resistance  Supine Bridge with Resistance Band  Seated Hip Abduction with Resistance  Supine Piriformis Stretch with Foot on Ground     OP Education: HEP:    Access Code: [email or web address]  URL: [email or web address]  Date: 05/08/2025  Prepared by: Rubio Lemus    Exercises  - Seated Hip Abduction with Resistance  - 1 x daily - 4 x weekly - 3 sets - 10 reps  - Supine Piriformis Stretch with Foot on Ground  - 1 x daily - 7 x weekly - 3 sets - 10 reps - 30 sec  hold  - Supine Bridge with Resistance Band  - 1 x daily - 4 x weekly - 3 sets - 10 reps  - Clamshell with Resistance  - 1 x daily - 4 x weekly - 3 sets - 10 reps - 3 sec hold    Assessment:  Patient is a 70 y.o. FEMALE presents to [facility] for assessment and treatment of mobility related impairments s/p chronic R hip pain complicated by hip bursitis.   Patient is alert and oriented x3. Patient presents with medical diagnosis of Bursitis of R hip contributing  to compensatory soft tissue dysfunction, pain, stiffness and weakness of the R LE. Significant past medical history/past surgical history includes OA, chronic neck pain, HTN. Skilled care is needed to progress the patient back to these activities without exacerbating symptoms. Patient requires skilled PT services to address the problems identified and the individualized patient's goals as outlined in the problems and goals section of this evaluation. A skilled PT is required to address these key impairments and to provide and progress with an appropriate home exercise program. Patient does have significant PMH influencing Rx and reports motivation to return to [facility]. Patient demonstrates to be a good candidate for physical therapy with good rehab potential and verbalized a good understanding of their diagnosis, prognosis and treatment. Pt is motivated and has strong family support which reinforces their potential for improvement.  Pt would benefit from skilled physical therapy to improve strength, pain management and decrease fall risk.  Goals have been established and reviewed with the patient.      PT Assessment Results: Decreased strength, Decreased range of motion, Decreased endurance, Impaired balance, Decreased mobility, Pain  Rehab Prognosis: Good       Plan:  Frequency/duration:      PT Plan: Skilled PT  PT Frequency: 1 time per week  Duration: 8 weeks  Onset Date: 02/13/25  Certification Period Start Date: 05/08/25  Certification Period End Date: 08/06/25     Rehab Potential: Good  Plan of Care Agreement: Patient    Goals:  Understand and demonstrate a home exercise program that will support and continue the process of neural plasticity resulting in continued improvement of lower extremity function, increased mobility, and safety.  Patient will demonstrate an increased score in LEFS score by 9 points to </=  54/80 to meet established MCID for the outcome measure (baseline 5/8/2025 : 49/80).    Pt will demonstrate improvement on the 30 second chair stand test as evidence by completing a minimum of 15 stands, which is the norm for their age, and demonstrate improvements in quality of transfer (i.e. does not press LE against chair for balance, controls ascend and descend, may use their UE for support). Baseline  5/8/25=8.   Patient will report the ability to remain standing >/= 30 min without pain in the back exceeding 2/10 to indicate an improved ability to perform activities within the home  Patient will demonstrate gross hip and knee strength of >/= 4+/5 to improve the ability to ambulate, squat, and complete ADL's within her home without restrictions     Complexity:  Low complexity evaluation due to a past medical history WITH personal factors and/or comorbidities that could impact the POC, 18 minutes for evaluation, examination of body systems completed with the patient presenting with a stable condition, and clinical decision making.    Rubio Lemus, PT  5/8/2025

## 2025-05-13 ENCOUNTER — HOSPITAL ENCOUNTER (OUTPATIENT)
Dept: RADIOLOGY | Facility: CLINIC | Age: 70
Discharge: HOME | End: 2025-05-13
Payer: MEDICARE

## 2025-05-13 ENCOUNTER — HOSPITAL ENCOUNTER (OUTPATIENT)
Dept: CARDIOLOGY | Facility: CLINIC | Age: 70
Discharge: HOME | End: 2025-05-13
Payer: MEDICARE

## 2025-05-13 DIAGNOSIS — R06.02 SHORTNESS OF BREATH: ICD-10-CM

## 2025-05-13 DIAGNOSIS — R00.2 PALPITATIONS: ICD-10-CM

## 2025-05-13 DIAGNOSIS — R00.1 BRADYCARDIA: ICD-10-CM

## 2025-05-13 PROCEDURE — 3430000001 HC RX 343 DIAGNOSTIC RADIOPHARMACEUTICALS: Performed by: INTERNAL MEDICINE

## 2025-05-13 PROCEDURE — 93017 CV STRESS TEST TRACING ONLY: CPT

## 2025-05-13 PROCEDURE — 78452 HT MUSCLE IMAGE SPECT MULT: CPT

## 2025-05-13 PROCEDURE — 78452 HT MUSCLE IMAGE SPECT MULT: CPT | Performed by: INTERNAL MEDICINE

## 2025-05-13 PROCEDURE — A9502 TC99M TETROFOSMIN: HCPCS | Performed by: INTERNAL MEDICINE

## 2025-05-13 PROCEDURE — 93016 CV STRESS TEST SUPVJ ONLY: CPT | Performed by: INTERNAL MEDICINE

## 2025-05-13 PROCEDURE — 93018 CV STRESS TEST I&R ONLY: CPT | Performed by: INTERNAL MEDICINE

## 2025-05-13 RX ADMIN — TETROFOSMIN 35.3 MILLICURIE: 0.23 INJECTION, POWDER, LYOPHILIZED, FOR SOLUTION INTRAVENOUS at 09:36

## 2025-05-13 RX ADMIN — TETROFOSMIN 11.1 MILLICURIE: 0.23 INJECTION, POWDER, LYOPHILIZED, FOR SOLUTION INTRAVENOUS at 08:00

## 2025-05-14 ENCOUNTER — APPOINTMENT (OUTPATIENT)
Dept: PHYSICAL THERAPY | Facility: CLINIC | Age: 70
End: 2025-05-14
Payer: MEDICARE

## 2025-05-22 ENCOUNTER — APPOINTMENT (OUTPATIENT)
Dept: PRIMARY CARE | Facility: CLINIC | Age: 70
End: 2025-05-22
Payer: MEDICARE

## 2025-05-22 DIAGNOSIS — J30.2 SEASONAL ALLERGIES: ICD-10-CM

## 2025-05-22 RX ORDER — FLUTICASONE PROPIONATE 50 MCG
2 SPRAY, SUSPENSION (ML) NASAL DAILY
Qty: 16 G | Refills: 1 | Status: SHIPPED | OUTPATIENT
Start: 2025-05-22 | End: 2026-05-22

## 2025-05-22 NOTE — TELEPHONE ENCOUNTER
Rx Refill Request     Name: Aaron Gilbert  :  1955   Medication Name:  fluticasone   Specific Pharmacy location:  [place] [place] \[facility].\""   Date of last appointment:  2025   Date of next appointment:  2025   Best number to reach patient:  [phone]

## 2025-05-28 ENCOUNTER — TREATMENT (OUTPATIENT)
Dept: PHYSICAL THERAPY | Facility: CLINIC | Age: 70
End: 2025-05-28
Payer: MEDICARE

## 2025-05-28 DIAGNOSIS — M70.71 BURSITIS OF RIGHT HIP, UNSPECIFIED BURSA: Primary | ICD-10-CM

## 2025-05-28 DIAGNOSIS — M76.891 HIP ABDUCTOR TENDINITIS, RIGHT: ICD-10-CM

## 2025-05-28 DIAGNOSIS — M70.71 BURSITIS OF RIGHT HIP: ICD-10-CM

## 2025-05-28 PROCEDURE — 97140 MANUAL THERAPY 1/> REGIONS: CPT | Mod: GP,CQ

## 2025-05-28 PROCEDURE — 97110 THERAPEUTIC EXERCISES: CPT | Mod: GP,CQ

## 2025-05-28 ASSESSMENT — PAIN SCALES - GENERAL: PAINLEVEL_OUTOF10: 7

## 2025-05-28 ASSESSMENT — PAIN - FUNCTIONAL ASSESSMENT: PAIN_FUNCTIONAL_ASSESSMENT: 0-10

## 2025-05-28 NOTE — PROGRESS NOTES
"Physical Therapy     Physical Therapy Treatment     Patient Name: Aaron Gilbert  MRN: [id number]  Today's Date: 5/28/2025  Time Calculation  Start Time: 1017  Stop Time: 1057  Time Calculation (min): 40 min    Insurance:  Visit: #2  Authorized: *Pending*   Certification: 5/8/25 to 8/6/25  Payor: MEDICARE / Plan: MEDICARE PART A AND B / Product Type: *No Product type* /      Subjective:  Pt reports \"it's the same.\" \"It was a 10/10 over the wkd.\" Pt reports sxs down R LE to foot      Current pain: 7/10     Objective:  L long-short  L ant hip glide noted.  T7-L5 FRSL & T10- L5 ERSL  Pt education to contract glute w/WB.     Treatment:  Clamshell x15  Supine Bridge with Resistance Band x10  Seated Hip Abduction with Resistance   Supine Piriformis Stretch with Foot on Ground 2x30\"ea way  Prone hip ext (knee flex and ext) x10ea   Quadraped Rockback 10\"x10  GSB lumbar flex 10\"x5  Gait tx      Manual:  Includes MET to decrease rotation. GF inf/post jt mobs (gr4). TPR tp R piriformis.     OP Education: HEP:    Access Code: [email or web address]  URL: [email or web address]  Date: 05/08/2025  Prepared by: Rubio Lemus     Exercises  - Seated Hip Abduction with Resistance  - 1 x daily - 4 x weekly - 3 sets - 10 reps  - Supine Piriformis Stretch with Foot on Ground  - 1 x daily - 7 x weekly - 3 sets - 10 reps - 30 sec  hold  - Supine Bridge with Resistance Band  - 1 x daily - 4 x weekly - 3 sets - 10 reps  - Clamshell with Resistance  - 1 x daily - 4 x weekly - 3 sets - 10 reps - 3 sec hold    Access Code: [email or web address]  URL: [email or web address]  Date: 05/28/2025  - Prone Hip Extension with Pillow Under Abdomen  - 1 x daily - 7 x weekly - 2 sets - 10 reps  - Prone Hip Extension with Bent Knee - One Pillow  - 1 x daily - 7 x weekly - 2 sets - 10 reps  - Quadruped Rockback Posterior Hip Capsule Stretch  - 1-3 x daily - 7 x weekly - 1 sets - 10 reps - 10 seconds hold          Diagnosis:  Problem List " Items Addressed This Visit           ICD-10-CM    Bursitis of right hip - Primary M70.71    Hip abductor tendinitis, right M76.891     Goals:  Understand and demonstrate a home exercise program that will support and continue the process of neural plasticity resulting in continued improvement of lower extremity function, increased mobility, and safety.  Patient will demonstrate an increased score in LEFS score by 9 points to </=  54/80 to meet established MCID for the outcome measure (baseline 5/28/2025 : 49/80).   Pt will demonstrate improvement on the 30 second chair stand test as evidence by completing a minimum of 15 stands, which is the norm for their age, and demonstrate improvements in quality of transfer (i.e. does not press LE against chair for balance, controls ascend and descend, may use their UE for support). Baseline  5/8/25=8.   Patient will report the ability to remain standing >/= 30 min without pain in the back exceeding 2/10 to indicate an improved ability to perform activities within the home  Patient will demonstrate gross hip and knee strength of >/= 4+/5 to improve the ability to ambulate, squat, and complete ADL's within her home without restrictions       Assessment: Decreased malalignment/ muscle tightness noted after manual. Pt reports 5/10 R hip pain after tx. Patient would benefit from P.T. to continue to address impairments in order to improve strength, flexibility, posture, and  to decrease symptoms and increase overall function.         Plan:  Monitor alignment. Increase glute strength as able. Dry needling if pt not doing better NV.     Ally Encarnacion, PTA   5/28/2025

## 2025-05-29 ENCOUNTER — APPOINTMENT (OUTPATIENT)
Dept: CARDIOLOGY | Facility: CLINIC | Age: 70
End: 2025-05-29
Payer: MEDICARE

## 2025-05-29 ENCOUNTER — HOSPITAL ENCOUNTER (OUTPATIENT)
Dept: CARDIOLOGY | Facility: CLINIC | Age: 70
Discharge: HOME | End: 2025-05-29
Payer: MEDICARE

## 2025-05-29 ENCOUNTER — ANCILLARY PROCEDURE (OUTPATIENT)
Dept: CARDIOLOGY | Facility: CLINIC | Age: 70
End: 2025-05-29
Payer: MEDICARE

## 2025-05-29 DIAGNOSIS — R06.02 SHORTNESS OF BREATH: ICD-10-CM

## 2025-05-29 DIAGNOSIS — R00.1 BRADYCARDIA: ICD-10-CM

## 2025-05-29 DIAGNOSIS — R00.2 PALPITATIONS: ICD-10-CM

## 2025-05-29 DIAGNOSIS — I10 ESSENTIAL (PRIMARY) HYPERTENSION: ICD-10-CM

## 2025-05-29 LAB
AORTIC VALVE MEAN GRADIENT: 5 MMHG
AORTIC VALVE PEAK VELOCITY: 1.41 M/S
AV PEAK GRADIENT: 8 MMHG
AVA (PEAK VEL): 1.78 CM2
AVA (VTI): 1.78 CM2
EJECTION FRACTION APICAL 4 CHAMBER: 59.5
EJECTION FRACTION: 60 %
LEFT VENTRICLE INTERNAL DIMENSION DIASTOLE: 5.2 CM (ref 3.5–6)
LEFT VENTRICULAR OUTFLOW TRACT DIAMETER: 1.8 CM
LV EJECTION FRACTION BIPLANE: 61 %
MITRAL VALVE E/A RATIO: 0.88
MITRAL VALVE E/E' RATIO: 9.6
RIGHT VENTRICLE FREE WALL PEAK S': 12.4 CM/S
RIGHT VENTRICLE PEAK SYSTOLIC PRESSURE: 32 MMHG
TRICUSPID ANNULAR PLANE SYSTOLIC EXCURSION: 2.1 CM

## 2025-05-29 PROCEDURE — 93306 TTE W/DOPPLER COMPLETE: CPT

## 2025-05-29 PROCEDURE — 93306 TTE W/DOPPLER COMPLETE: CPT | Performed by: INTERNAL MEDICINE

## 2025-05-30 ENCOUNTER — TELEPHONE (OUTPATIENT)
Dept: OTOLARYNGOLOGY | Facility: CLINIC | Age: 70
End: 2025-05-30
Payer: MEDICARE

## 2025-05-30 DIAGNOSIS — D80.6 ANTI-PNEUMOCOCCAL POLYSACCHARIDE ANTIBODY DEFICIENCY (MULTI): Primary | ICD-10-CM

## 2025-06-02 ENCOUNTER — APPOINTMENT (OUTPATIENT)
Dept: CARDIOLOGY | Facility: CLINIC | Age: 70
End: 2025-06-02
Payer: MEDICARE

## 2025-06-03 ENCOUNTER — TREATMENT (OUTPATIENT)
Dept: PHYSICAL THERAPY | Facility: CLINIC | Age: 70
End: 2025-06-03
Payer: MEDICARE

## 2025-06-03 ENCOUNTER — APPOINTMENT (OUTPATIENT)
Dept: ALLERGY | Facility: CLINIC | Age: 70
End: 2025-06-03
Payer: MEDICARE

## 2025-06-03 DIAGNOSIS — M70.71 BURSITIS OF RIGHT HIP: ICD-10-CM

## 2025-06-03 DIAGNOSIS — M70.71 BURSITIS OF RIGHT HIP, UNSPECIFIED BURSA: Primary | ICD-10-CM

## 2025-06-03 DIAGNOSIS — M76.891 HIP ABDUCTOR TENDINITIS, RIGHT: ICD-10-CM

## 2025-06-03 PROCEDURE — 97110 THERAPEUTIC EXERCISES: CPT | Mod: GP,CQ

## 2025-06-03 PROCEDURE — 97140 MANUAL THERAPY 1/> REGIONS: CPT | Mod: GP,CQ

## 2025-06-03 ASSESSMENT — PAIN SCALES - GENERAL: PAINLEVEL_OUTOF10: 9

## 2025-06-03 ASSESSMENT — PAIN - FUNCTIONAL ASSESSMENT: PAIN_FUNCTIONAL_ASSESSMENT: 0-10

## 2025-06-03 NOTE — PROGRESS NOTES
"Physical Therapy     Physical Therapy Treatment     Patient Name: Aaron Gilbert  MRN: [id number]  Today's Date: 6/3/2025  Time Calculation  Start Time: 0100  Stop Time: 0145  Time Calculation (min): 45 min    Insurance:  Visit: #3  Authorized: *Pending*   Certification: 5/8/25 to 8/6/25  Payor: MEDICARE / Plan: MEDICARE PART A AND B / Product Type: *No Product type* /      Subjective:  Pt states that her right hip is feeling about the same. Temporary relief after last session but pain continues to disrupt sleep and all daily activities.      Current pain: 10/10     Objective:  Right posterior innominate rotation corrected with MET     Tightness/trigger points noted in right piriformis     Treatment:  Clamshell x15  Supine Bridge with Resistance Band x10  Seated Hip Abduction with Resistance NT   Supine figure 4 Piriformis Stretch 3x30\" B  Prone hip ext (knee flex and ext) x10ea   Quadraped Rockback 10\"x10  GSB lumbar flex 10\"x5  Gait training   Supine hip abd/add     Manual therapy:   MET to correct pelvic rotation  STM/TPR to right piriformis in left sidelying with pillow between knees          OP Education: HEP:    Access Code: [email or web address]  URL: [email or web address]  Date: 05/08/2025  Prepared by: Rubio Lemus     Exercises  - Seated Hip Abduction with Resistance  - 1 x daily - 4 x weekly - 3 sets - 10 reps  - Supine Piriformis Stretch with Foot on Ground  - 1 x daily - 7 x weekly - 3 sets - 10 reps - 30 sec  hold  - Supine Bridge with Resistance Band  - 1 x daily - 4 x weekly - 3 sets - 10 reps  - Clamshell with Resistance  - 1 x daily - 4 x weekly - 3 sets - 10 reps - 3 sec hold    Access Code: [email or web address]  URL: [email or web address]  Date: 05/28/2025  - Prone Hip Extension with Pillow Under Abdomen  - 1 x daily - 7 x weekly - 2 sets - 10 reps  - Prone Hip Extension with Bent Knee - One Pillow  - 1 x daily - 7 x weekly - 2 sets - 10 reps  - Quadruped Rockback Posterior Hip " Capsule Stretch  - 1-3 x daily - 7 x weekly - 1 sets - 10 reps - 10 seconds hold          Diagnosis:  Problem List Items Addressed This Visit           ICD-10-CM    Bursitis of right hip - Primary M70.71    Hip abductor tendinitis, right M76.891     Goals:  Understand and demonstrate a home exercise program that will support and continue the process of neural plasticity resulting in continued improvement of lower extremity function, increased mobility, and safety.  Patient will demonstrate an increased score in LEFS score by 9 points to </=  54/80 to meet established MCID for the outcome measure (baseline 5/28/2025 : 49/80).   Pt will demonstrate improvement on the 30 second chair stand test as evidence by completing a minimum of 15 stands, which is the norm for their age, and demonstrate improvements in quality of transfer (i.e. does not press LE against chair for balance, controls ascend and descend, may use their UE for support). Baseline  5/8/25=8.   Patient will report the ability to remain standing >/= 30 min without pain in the back exceeding 2/10 to indicate an improved ability to perform activities within the home  Patient will demonstrate gross hip and knee strength of >/= 4+/5 to improve the ability to ambulate, squat, and complete ADL's within her home without restrictions       Assessment: level pelvis at end of session. Patient reported decreased pain to 6/10 in right hip at end of session. Decreased tightness noted in right piriformis following manual therapy. Patient would benefit from P.T. to continue to address impairments in order to improve strength, flexibility, posture, and body mechanics to decrease symptoms and increase overall function.         Plan:  Monitor alignment. Increase glute strength as able.      Luz Blakely, PTA   6/3/2025

## 2025-06-04 ENCOUNTER — APPOINTMENT (OUTPATIENT)
Dept: PHYSICAL THERAPY | Facility: CLINIC | Age: 70
End: 2025-06-04
Payer: MEDICARE

## 2025-06-09 DIAGNOSIS — F41.9 ANXIETY: ICD-10-CM

## 2025-06-09 RX ORDER — BUSPIRONE HYDROCHLORIDE 15 MG/1
15 TABLET ORAL 3 TIMES DAILY
Qty: 90 TABLET | Refills: 2 | Status: SHIPPED | OUTPATIENT
Start: 2025-06-09

## 2025-06-09 NOTE — TELEPHONE ENCOUNTER
Rx Refill Request Telephone Encounter    Name:  Aaron Gilbert  :  [id number]    Specific Pharmacy location:  [facility] pharmacy in [place]   Date of last appointment: 25  Date of next appointment:  25

## 2025-06-10 ENCOUNTER — APPOINTMENT (OUTPATIENT)
Dept: RADIOLOGY | Facility: HOSPITAL | Age: 70
End: 2025-06-10
Payer: MEDICARE

## 2025-06-10 DIAGNOSIS — Z12.31 ENCOUNTER FOR SCREENING MAMMOGRAM FOR BREAST CANCER: ICD-10-CM

## 2025-06-11 ENCOUNTER — TREATMENT (OUTPATIENT)
Dept: PHYSICAL THERAPY | Facility: CLINIC | Age: 70
End: 2025-06-11
Payer: MEDICARE

## 2025-06-11 DIAGNOSIS — M70.71 BURSITIS OF RIGHT HIP: ICD-10-CM

## 2025-06-11 DIAGNOSIS — M76.891 HIP ABDUCTOR TENDINITIS, RIGHT: ICD-10-CM

## 2025-06-11 DIAGNOSIS — M70.71 BURSITIS OF RIGHT HIP, UNSPECIFIED BURSA: ICD-10-CM

## 2025-06-11 ASSESSMENT — PAIN SCALES - GENERAL: PAINLEVEL_OUTOF10: 5 - MODERATE PAIN

## 2025-06-11 ASSESSMENT — PAIN - FUNCTIONAL ASSESSMENT: PAIN_FUNCTIONAL_ASSESSMENT: 0-10

## 2025-06-11 NOTE — PROGRESS NOTES
"Physical Therapy     Physical Therapy Treatment     Patient Name: Aaron Gilbert  MRN: [id number]  Today's Date: 6/11/2025  Time Calculation  Start Time: 1100  Stop Time: 1144  Time Calculation (min): 44 min    Insurance:  Visit: #4  Authorized: *Pending*   Certification: 5/8/25 to 8/6/25  Payor: MEDICARE / Plan: MEDICARE PART A AND B / Product Type: *No Product type* /      Subjective:  Pt states that her right hip is feeling a little better after last tx ~8/10 and then saw the chiropractor which helped as well ~5/10 after that.    Current pain: 5/10     Objective:  Right posterior innominate rotation corrected with MET   T8-L5 ERSL  L4-5 FRSR    R ant hip glide noted.    Tightness/trigger points noted in right piriformis     Treatment:  Therapeutic Exercise   Clamshell x15  Supine Bridge with Resistance Band x10 NT  Seated Hip Abduction/ ADD BLKTB/PB 5\"x15ea  Supine figure 4 Piriformis Stretch 3x30\" R  Prone hip ext (knee flex and ext) 2x10ea   Quadraped Rockback 10\"x10  GSB lumbar flex 10\"x5 NT  Gait training   Sit-stand x8      Manual therapy:   MET to correct pelvic rotation and T8-L5.  Ing/post jt mob (gr4) to R hip jt.  STM/TPR to right piriformis in left sidelying with pillow between knees.         OP Education: HEP:    Access Code: [email or web address]  URL: [email or web address]  Date: 05/08/2025  Prepared by: Rubio Lemus     Exercises  - Seated Hip Abduction with Resistance  - 1 x daily - 4 x weekly - 3 sets - 10 reps  - Supine Piriformis Stretch with Foot on Ground  - 1 x daily - 7 x weekly - 3 sets - 10 reps - 30 sec  hold  - Supine Bridge with Resistance Band  - 1 x daily - 4 x weekly - 3 sets - 10 reps  - Clamshell with Resistance  - 1 x daily - 4 x weekly - 3 sets - 10 reps - 3 sec hold    Access Code: [email or web address]  URL: [email or web address]  Date: 05/28/2025  - Prone Hip Extension with Pillow Under Abdomen  - 1 x daily - 7 x weekly - 2 sets - 10 reps  - Prone Hip " "Extension with Bent Knee - One Pillow  - 1 x daily - 7 x weekly - 2 sets - 10 reps  - Quadruped Rockback Posterior Hip Capsule Stretch  - 1-3 x daily - 7 x weekly - 1 sets - 10 reps - 10 seconds hold          Diagnosis:  Problem List Items Addressed This Visit           ICD-10-CM    Bursitis of right hip M70.71    Hip abductor tendinitis, right M76.891       Goals:  Understand and demonstrate a home exercise program that will support and continue the process of neural plasticity resulting in continued improvement of lower extremity function, increased mobility, and safety.  Patient will demonstrate an increased score in LEFS score by 9 points to </=  54/80 to meet established MCID for the outcome measure (baseline 5/28/2025 : 49/80).   Pt will demonstrate improvement on the 30 second chair stand test as evidence by completing a minimum of 15 stands, which is the norm for their age, and demonstrate improvements in quality of transfer (i.e. does not press LE against chair for balance, controls ascend and descend, may use their UE for support). Baseline  5/8/25=8.   Patient will report the ability to remain standing >/= 30 min without pain in the back exceeding 2/10 to indicate an improved ability to perform activities within the home  Patient will demonstrate gross hip and knee strength of >/= 4+/5 to improve the ability to ambulate, squat, and complete ADL's within her home without restrictions       Assessment: Decreased malalignment/ muscle tightness noted after manual.  Patient reported \"it's a little better\" in right hip at end of session. Pt reports decreased pain w/GS w/WB activities. Patient would benefit from P.T. to continue to address impairments in order to improve strength, flexibility, posture, and body mechanics to decrease symptoms and increase overall function.         Plan:  Monitor alignment. Increase glute strength as able. Pt would like to try dry needling to see if it helps.     Ally Encarnacion, " PTA   6/11/2025

## 2025-06-12 ENCOUNTER — HOSPITAL ENCOUNTER (OUTPATIENT)
Dept: RADIOLOGY | Facility: HOSPITAL | Age: 70
Discharge: HOME | End: 2025-06-12
Payer: MEDICARE

## 2025-06-12 DIAGNOSIS — R00.2 PALPITATIONS: ICD-10-CM

## 2025-06-12 DIAGNOSIS — I10 ESSENTIAL (PRIMARY) HYPERTENSION: ICD-10-CM

## 2025-06-12 PROCEDURE — 75571 CT HRT W/O DYE W/CA TEST: CPT

## 2025-06-18 ENCOUNTER — APPOINTMENT (OUTPATIENT)
Dept: ORTHOPEDIC SURGERY | Facility: CLINIC | Age: 70
End: 2025-06-18
Payer: MEDICARE

## 2025-06-18 ENCOUNTER — TREATMENT (OUTPATIENT)
Dept: PHYSICAL THERAPY | Facility: CLINIC | Age: 70
End: 2025-06-18
Payer: MEDICARE

## 2025-06-18 DIAGNOSIS — M70.71 BURSITIS OF RIGHT HIP: ICD-10-CM

## 2025-06-18 DIAGNOSIS — M76.891 HIP ABDUCTOR TENDINITIS, RIGHT: ICD-10-CM

## 2025-06-18 DIAGNOSIS — M70.71 BURSITIS OF RIGHT HIP, UNSPECIFIED BURSA: ICD-10-CM

## 2025-06-18 PROCEDURE — 97110 THERAPEUTIC EXERCISES: CPT | Mod: GP,CQ

## 2025-06-18 PROCEDURE — 97140 MANUAL THERAPY 1/> REGIONS: CPT | Mod: GP,CQ

## 2025-06-18 ASSESSMENT — PAIN - FUNCTIONAL ASSESSMENT: PAIN_FUNCTIONAL_ASSESSMENT: 0-10

## 2025-06-18 ASSESSMENT — PAIN SCALES - GENERAL: PAINLEVEL_OUTOF10: 8

## 2025-06-18 NOTE — PROGRESS NOTES
"Physical Therapy     Physical Therapy Treatment     Patient Name: Aaron Gilbert  MRN: [id number]  Today's Date: 6/18/2025  Time Calculation  Start Time: 1017  Stop Time: 1059  Time Calculation (min): 42 min    Insurance:  Visit: #5  Authorized: *Pending*   Certification: 5/8/25 to 8/6/25  Payor: MEDICARE / Plan: MEDICARE PART A AND B / Product Type: *No Product type* /      Subjective:  Pt states that her right hip is feeling worse again ~8/10.    Current pain: 8/10     Objective:  Right posterior innominate rotation corrected with MET   T8-L5 ERSL  L4-5 FRSR    R ant hip glide noted still. R patella tracking lateral and some pez planus noted. Recommended pt use arch support orthotics to increase alignment of R LE.    Tightness/trigger points noted in right piriformis     Treatment:  Therapeutic Exercise   Clamshell GTB  x15 B  Supine Bridge with Resistance Band x10 NT  Seated Hip Abduction/ ADD BLKTB/PB 5\"x15ea  Supine figure 4 Piriformis Stretch 3x30\" R  Prone hip ext (knee flex and ext) x15ea  B  Quadraped Rockback 10\"x10 (DC'ed)  Seated Lumbar flexion 10\"x10  GSB lumbar flex 10\"x5 NT  Gait training   LAQ 0# w/BS 2x15      Manual therapy:   MET to correct pelvic rotation.  Inf/post jt mob (gr4) to R hip jt.  STM/TPR to right TFL/ vastus lateralis in supine. Pt given written and verbal tape wearing instructions.  Leukotape to pull R patella medial.         OP Education: HEP:    Access Code: [email or web address]  URL: [email or web address]  Date: 05/08/2025  - Seated Hip Abduction with Resistance  - 1 x daily - 4 x weekly - 3 sets - 10 reps  - Supine Piriformis Stretch with Foot on Ground  - 1 x daily - 7 x weekly - 3 sets - 10 reps - 30 sec  hold  - Supine Bridge with Resistance Band  - 1 x daily - 4 x weekly - 3 sets - 10 reps  - Clamshell with Resistance  - 1 x daily - 4 x weekly - 3 sets - 10 reps - 3 sec hold    Access Code: [email or web address]  URL: [email or web address]  Date: " 05/28/2025  - Prone Hip Extension with Pillow Under Abdomen  - 1 x daily - 7 x weekly - 2 sets - 10 reps  - Prone Hip Extension with Bent Knee - One Pillow  - 1 x daily - 7 x weekly - 2 sets - 10 reps  - Quadruped Rockback Posterior Hip Capsule Stretch  - 1-3 x daily - 7 x weekly - 1 sets - 10 reps - 10 seconds hold    Access Code: [email or web address]  URL: [email or web address]  Date: 06/18/2025  - Seated Flexion Stretch  - 1-3 x daily - 7 x weekly - 1 sets - 10 reps - 10 seconds hold (INSTEAD OF QUADRUPED)  - Clamshell with Resistance  - 1 x daily - 5 x weekly - 2 sets - 15 reps          Diagnosis:  Problem List Items Addressed This Visit           ICD-10-CM    Bursitis of right hip M70.71    Hip abductor tendinitis, right M76.891         Goals:  Understand and demonstrate a home exercise program that will support and continue the process of neural plasticity resulting in continued improvement of lower extremity function, increased mobility, and safety.  Patient will demonstrate an increased score in LEFS score by 9 points to </=  54/80 to meet established MCID for the outcome measure (baseline 5/28/2025 : 49/80).   Pt will demonstrate improvement on the 30 second chair stand test as evidence by completing a minimum of 15 stands, which is the norm for their age, and demonstrate improvements in quality of transfer (i.e. does not press LE against chair for balance, controls ascend and descend, may use their UE for support). Baseline  5/8/25=8.   Patient will report the ability to remain standing >/= 30 min without pain in the back exceeding 2/10 to indicate an improved ability to perform activities within the home  Patient will demonstrate gross hip and knee strength of >/= 4+/5 to improve the ability to ambulate, squat, and complete ADL's within her home without restrictions       Assessment: Decreased malalignment/ muscle tightness noted after manual. Added leukotape to pull R patella medial to  "possibly decrease compensation at hip. Recommended pt use arch support orthotics to increase alignment of R LE. Patient reported \"it's a little better\" ~5/10 in right hip at end of session. Patient would benefit from P.T. to continue to address impairments in order to improve strength, flexibility, posture, and body mechanics to decrease symptoms and increase overall function.         Plan:  Monitor alignment. Increase glute strength as able. Pt's ins does not cover dry needling. Assess tape NV.     Ally Encarnacion, PTA   6/18/2025   "

## 2025-06-19 LAB
S PN DA SERO 19F IGG SER-MCNC: 3.5 UG/ML
S PNEUM DA 1 IGG SER-MCNC: 10 UG/ML
S PNEUM DA 10A IGG SER-MCNC: 21.3 UG/ML
S PNEUM DA 11A IGG SER-MCNC: 4.2 UG/ML
S PNEUM DA 12F IGG SER-MCNC: 3.1 UG/ML
S PNEUM DA 14 IGG SER-MCNC: 46.9
S PNEUM DA 15B IGG SER-MCNC: 0.9 UG/ML
S PNEUM DA 17F IGG SER-MCNC: 1.1 UG/ML
S PNEUM DA 18C IGG SER-MCNC: 16.8
S PNEUM DA 19A IGG SER-MCNC: 13.9 UG/ML
S PNEUM DA 2 IGG SER-MCNC: 0.5 UG/ML
S PNEUM DA 20A IGG SER-MCNC: 0.7 UG/ML
S PNEUM DA 22F IGG SER-MCNC: 10 UG/ML
S PNEUM DA 23F IGG SER-MCNC: 21.9 UG/ML
S PNEUM DA 3 IGG SER-MCNC: 1.3 UG/ML
S PNEUM DA 33F IGG SER-MCNC: 10 UG/ML
S PNEUM DA 4 IGG SER-MCNC: 3.6 UG/ML
S PNEUM DA 5 IGG SER-MCNC: 4.2 UG/ML
S PNEUM DA 6B IGG SER-MCNC: 5.5 UG/ML
S PNEUM DA 7F IGG SER-MCNC: 1.6 UG/ML
S PNEUM DA 8 IGG SER-MCNC: 27.5 UG/ML
S PNEUM DA 9N IGG SER-MCNC: 0.7 UG/ML
S PNEUM DA 9V IGG SER-MCNC: 10 UG/ML

## 2025-06-20 PROCEDURE — 93248 EXT ECG>7D<15D REV&INTERPJ: CPT | Performed by: INTERNAL MEDICINE

## 2025-06-20 NOTE — PROGRESS NOTES
History: Aaron is here for her right shoulder.    Past medical history: Multiple  Medications: Multiple  Allergies: No known drug allergies    Please refer to the intake H&P regarding the patient's review of systems, family history and social history as was done today    HEENT: Normal  Lungs: Clear to auscultation  Heart: RRR  Abdomen: Soft, nontender  Skin: clear  Extremity: []   Contralateral exam is normal for strength, motion, stability and neurovascular assessment.    Radiographs: []     Assessment: []     Plan: []   All questions were answered today with the patient.    Scribe Attestation:  By signing my name below, I, Liam Andrade attest that this documentation has been prepared under the direction and in the presence of Sekou Cobb MD.    Provider Attestation - Scribe documentation:  All medical record entries made by Leydi Montes De Oca were at my direction and personally dictated by me, Sekou Cobb MD. I have reviewed the chart and agree that the record is accurate and I confirmed that it reflects my personal performance of the history, physical exam, discussion, and plan.

## 2025-06-23 ENCOUNTER — APPOINTMENT (OUTPATIENT)
Dept: ORTHOPEDIC SURGERY | Facility: CLINIC | Age: 70
End: 2025-06-23
Payer: MEDICARE

## 2025-06-25 ENCOUNTER — APPOINTMENT (OUTPATIENT)
Dept: PHYSICAL THERAPY | Facility: CLINIC | Age: 70
End: 2025-06-25
Payer: MEDICARE

## 2025-06-26 ENCOUNTER — APPOINTMENT (OUTPATIENT)
Dept: CARDIOLOGY | Facility: CLINIC | Age: 70
End: 2025-06-26
Payer: MEDICARE

## 2025-07-02 ENCOUNTER — APPOINTMENT (OUTPATIENT)
Dept: PHYSICAL THERAPY | Facility: CLINIC | Age: 70
End: 2025-07-02
Payer: MEDICARE

## 2025-07-07 ENCOUNTER — APPOINTMENT (OUTPATIENT)
Dept: ORTHOPEDIC SURGERY | Facility: CLINIC | Age: 70
End: 2025-07-07
Payer: MEDICARE

## 2025-07-07 DIAGNOSIS — M50.30 DDD (DEGENERATIVE DISC DISEASE), CERVICAL: ICD-10-CM

## 2025-07-07 PROCEDURE — 99212 OFFICE O/P EST SF 10 MIN: CPT | Performed by: ORTHOPAEDIC SURGERY

## 2025-07-07 PROCEDURE — 99213 OFFICE O/P EST LOW 20 MIN: CPT | Performed by: ORTHOPAEDIC SURGERY

## 2025-07-07 NOTE — PROGRESS NOTES
History: Aaron is here for her right shoulder.  She had a prior right rotator cuff repair by Dr. Joey Cobb and had a workup last year for shoulder pain as well.  She has had pain over the last year in the back of the shoulder blade and up into the neck.  She occasion gets numbness and tingling down in the arm.  She is try to remain active but has persistent discomfort.    Past medical history: Multiple  Medications: Multiple  Allergies: No known drug allergies    Please refer to the intake H&P regarding the patient's review of systems, family history and social history as was done today    HEENT: Normal  Lungs: Clear to auscultation  Heart: RRR  Abdomen: Soft, nontender  Skin: clear  Extremity: She has pain with neck extension and rotation.  She has spasm in the trapezius and medial scapular border.  She has 5 out of 5 shoulder strength in all groups tested.  Good passive motion.  No tightness or stiffness.  No numbness today.  Contralateral exam is normal for strength, motion, stability and neurovascular assessment.    Radiographs: Shoulder x-rays are essentially negative.  Previous MRI last year showed an intact rotator cuff repair with mild inflammation only.  Cervical x-rays showed multilevel cervical DDD with loss of lordosis.    Assessment: Cervical DDD, history of right rotator cuff repair    Plan: Her pain is all in the medial scapular border and neck.  She saw Dr. Joey Cobb last year for a similar flareup.  She is also getting some occasional numbness and tingling down in the arm.  Her shoulder function is excellent.  At this point we discussed getting her into a formal therapy program for her cervical issues.  If not improved she is going to follow-up with the spine team for further imaging or treatment options as needed.  All questions were answered today with the patient.    Scribe Attestation:  By signing my name below, Leydi HERNANDEZ Scribe attest that this documentation has been  prepared under the direction and in the presence of Sekou Cobb MD.    Provider Attestation - Scribe documentation:  All medical record entries made by Leydi Montes De Oca were at my direction and personally dictated by me, Sekou Cobb MD. I have reviewed the chart and agree that the record is accurate and I confirmed that it reflects my personal performance of the history, physical exam, discussion, and plan.

## 2025-07-08 DIAGNOSIS — R39.9 UTI SYMPTOMS: Primary | ICD-10-CM

## 2025-07-09 ENCOUNTER — APPOINTMENT (OUTPATIENT)
Dept: PHYSICAL THERAPY | Facility: CLINIC | Age: 70
End: 2025-07-09
Payer: MEDICARE

## 2025-07-09 ENCOUNTER — OFFICE VISIT (OUTPATIENT)
Dept: OTOLARYNGOLOGY | Facility: CLINIC | Age: 70
End: 2025-07-09
Payer: MEDICARE

## 2025-07-09 DIAGNOSIS — D80.6 ANTI-PNEUMOCOCCAL POLYSACCHARIDE ANTIBODY DEFICIENCY (MULTI): ICD-10-CM

## 2025-07-09 DIAGNOSIS — J32.9 CHRONIC RHINOSINUSITIS: ICD-10-CM

## 2025-07-09 DIAGNOSIS — R51.9 SINUS HEADACHE: Primary | ICD-10-CM

## 2025-07-09 PROCEDURE — 99214 OFFICE O/P EST MOD 30 MIN: CPT | Performed by: OTOLARYNGOLOGY

## 2025-07-09 PROCEDURE — 1160F RVW MEDS BY RX/DR IN RCRD: CPT | Performed by: OTOLARYNGOLOGY

## 2025-07-09 PROCEDURE — 1159F MED LIST DOCD IN RCRD: CPT | Performed by: OTOLARYNGOLOGY

## 2025-07-09 NOTE — PROGRESS NOTES
Patient returns.  Seeing her back today for follow-up check on her sinuses.  She is doing better with her treatment with the Prevnar vaccine where she was previously weak on 19 out of 23 strains of strep now week on only 5.  This is a dramatic improvement.  Unfortunately, still having evidence of sinus headache phenomenon.  This is not located to one specific spot.  All remaining ENT inquiry clear.  There have been no significant changes in past medical or past surgical histories except as mentioned.    Exam:  No acute distress.  The external ear structures appear normal. The ear canals patent and the tympanic membranes are intact without evidence of air-fluid levels, retraction, or congenital defects.  Anterior rhinoscopy notes essentially a midline nasal septum. Examination is noted for normal healthy mucosal membranes without any evidence of lesions, polyps, or exudate. The tongue is normally mobile. There are no lesions on the gingiva, buccal, or oral mucosa. There are no oral cavity masses.  The neck is negative for mass lymphadenopathy. The trachea and parotid are clear. The thyroid bed is grossly unremarkable. The salivary gland structures are grossly unremarkable.    Assessment and plan:  1.  History of recurrent rhinosinusitis with immune deficiency now corrected based on improved pneumococcal antibody titers favor observation.  2.  Sinus headache phenomenon.  We will have her utilize double strength nasal steroid spray and call me in 1 month and if not improved I will likely have her see dedicated neurology colleagues.  All questions were answered in this regard accordingly.

## 2025-07-10 ENCOUNTER — APPOINTMENT (OUTPATIENT)
Dept: ORTHOPEDIC SURGERY | Facility: CLINIC | Age: 70
End: 2025-07-10
Payer: MEDICARE

## 2025-07-11 DIAGNOSIS — N39.0 ACUTE UTI: ICD-10-CM

## 2025-07-11 DIAGNOSIS — B37.31 VAGINAL CANDIDIASIS: Primary | ICD-10-CM

## 2025-07-11 RX ORDER — FLUCONAZOLE 150 MG/1
150 TABLET ORAL ONCE
Qty: 2 TABLET | Refills: 0 | Status: SHIPPED | OUTPATIENT
Start: 2025-07-11 | End: 2025-07-11

## 2025-07-11 RX ORDER — AMOXICILLIN 875 MG/1
875 TABLET, COATED ORAL 2 TIMES DAILY
Qty: 14 TABLET | Refills: 0 | Status: SHIPPED | OUTPATIENT
Start: 2025-07-11 | End: 2025-07-18

## 2025-07-14 ENCOUNTER — APPOINTMENT (OUTPATIENT)
Dept: ORTHOPEDIC SURGERY | Facility: CLINIC | Age: 70
End: 2025-07-14
Payer: MEDICARE

## 2025-07-14 ENCOUNTER — HOSPITAL ENCOUNTER (OUTPATIENT)
Dept: RADIOLOGY | Facility: CLINIC | Age: 70
Discharge: HOME | End: 2025-07-14
Payer: MEDICARE

## 2025-07-14 DIAGNOSIS — M25.561 ACUTE PAIN OF RIGHT KNEE: ICD-10-CM

## 2025-07-14 DIAGNOSIS — M17.11 PRIMARY OSTEOARTHRITIS OF RIGHT KNEE: Primary | ICD-10-CM

## 2025-07-14 PROCEDURE — 20610 DRAIN/INJ JOINT/BURSA W/O US: CPT | Mod: RT | Performed by: ORTHOPAEDIC SURGERY

## 2025-07-14 PROCEDURE — 2500000004 HC RX 250 GENERAL PHARMACY W/ HCPCS (ALT 636 FOR OP/ED): Performed by: ORTHOPAEDIC SURGERY

## 2025-07-14 PROCEDURE — 73560 X-RAY EXAM OF KNEE 1 OR 2: CPT | Mod: RIGHT SIDE | Performed by: ORTHOPAEDIC SURGERY

## 2025-07-14 PROCEDURE — 99214 OFFICE O/P EST MOD 30 MIN: CPT | Performed by: ORTHOPAEDIC SURGERY

## 2025-07-14 PROCEDURE — 99212 OFFICE O/P EST SF 10 MIN: CPT | Mod: 25 | Performed by: ORTHOPAEDIC SURGERY

## 2025-07-14 PROCEDURE — 73560 X-RAY EXAM OF KNEE 1 OR 2: CPT | Mod: RT

## 2025-07-14 RX ORDER — LIDOCAINE HYDROCHLORIDE 10 MG/ML
5 INJECTION, SOLUTION INFILTRATION; PERINEURAL
Status: COMPLETED | OUTPATIENT
Start: 2025-07-14 | End: 2025-07-14

## 2025-07-14 RX ORDER — BETAMETHASONE SODIUM PHOSPHATE AND BETAMETHASONE ACETATE 3; 3 MG/ML; MG/ML
2 INJECTION, SUSPENSION INTRA-ARTICULAR; INTRALESIONAL; INTRAMUSCULAR; SOFT TISSUE
Status: COMPLETED | OUTPATIENT
Start: 2025-07-14 | End: 2025-07-14

## 2025-07-14 RX ADMIN — BETAMETHASONE SODIUM PHOSPHATE AND BETAMETHASONE ACETATE 2 ML: 3; 3 INJECTION, SUSPENSION INTRA-ARTICULAR; INTRALESIONAL; INTRAMUSCULAR at 10:16

## 2025-07-14 RX ADMIN — LIDOCAINE HYDROCHLORIDE 5 ML: 10 INJECTION, SOLUTION INFILTRATION; PERINEURAL at 10:16

## 2025-07-14 NOTE — PROGRESS NOTES
History of present illness: Patient with complex history low back cervical pain neck pain in pain management    She has hip Ian Citic pain but does not respond to hip bursa injections she has been to PT therapy and pain management she now has new onset right knee pain    Physical exam:    General: No acute distress or breathing difficulty or discomfort, pleasant and cooperative with the examination.    Extremities: The affected right knee was examined and inspected and was tender to touch along the medial and lateral aspect with catching, locking or mechanical symptoms.    The skin was intact without breakdown or open wound.  Old incisions of present were healed.    There was a mild Nicko exam seen with some evidence of instability and weakness in the collateral ligaments with varus valgus stressing and laxity in the anterior or posterior planes.    There was a negative Lachman's test pivot shift test and posterior drawer sign with no foot drop, numbness or tingling.    Sensation, reflexes and pulses in the foot and ankle are preserved.  There was an effusion.  Range of motion showed good straight leg raise with flexion to 150 degrees  and extension to 0 degrees degrees.  The patient had the ability to bear weight but with discomfort.  The patient's gait was antalgic secondary to discomfort      Before aspiration injection the risks of a cortisone injection including infection, local skin irritation, skin atrophy, calcification, continued pain and discomfort, elevated blood sugar, burning, failure to relieve pain, possible late infection were discussed with the patient.    Postprocedure discomfort can be alleviated with additional medications, ice, elevation, rest over the first 24 hours as recommended.    Patient verbalized understanding and wanted to proceed with the planned procedure.    After informed consent was provided and allergies verified, the patient was positioned appropriately on the bed.  The right  knee to be aspirated and injected was prepped and draped in a sterile fashion.  The skin was anesthetized with ethyl chloride spray.  A joint aspiration was to be performed an 18-gauge needle was used otherwise a 22-gauge needle was used to inject the appropriate joint.    Joint injection was performed with a mixture of 5 cc 1% lidocaine plain and 2 cc Celestone Soluspan 6 mg per mL.  The needle was removed and the puncture site closed and sealed with a Band-Aid.  The patient tolerated the procedure well.        Diagnostic studies: X-rays show medial joint space bone-on-bone arthrosis neutral alignment right knee    Impression: Right knee medial joint space arthritis combination of low back mechanical pain as well    Plan: Knee injection today provided simple range of motion program I will see her back 4 to 5 weeks if not improved MRI of the knee would be in order along with possibility of gel shots and bracing      L Inj/Asp: R knee on 7/14/2025 10:16 AM  Indications: pain and diagnostic evaluation  Details: 22 G needle, medial approach  Medications: 5 mL lidocaine 10 mg/mL (1 %); 2 mL betamethasone acet,sod phos 6 mg/mL  Outcome: tolerated well, no immediate complications  Procedure, treatment alternatives, risks and benefits explained, specific risks discussed. Consent was given by the patient. Immediately prior to procedure a time out was called to verify the correct patient, procedure, equipment, support staff and site/side marked as required. Patient was prepped and draped in the usual sterile fashion.

## 2025-07-15 DIAGNOSIS — J30.2 SEASONAL ALLERGIES: ICD-10-CM

## 2025-07-15 RX ORDER — FLUTICASONE PROPIONATE 50 MCG
2 SPRAY, SUSPENSION (ML) NASAL 2 TIMES DAILY
Qty: 16 G | Refills: 1 | Status: SHIPPED | OUTPATIENT
Start: 2025-07-15 | End: 2026-07-15

## 2025-07-17 ENCOUNTER — EVALUATION (OUTPATIENT)
Dept: PHYSICAL THERAPY | Facility: CLINIC | Age: 70
End: 2025-07-17
Payer: MEDICARE

## 2025-07-17 DIAGNOSIS — M54.2 NECK PAIN: Primary | ICD-10-CM

## 2025-07-17 PROCEDURE — 97161 PT EVAL LOW COMPLEX 20 MIN: CPT | Mod: GP

## 2025-07-17 PROCEDURE — 97110 THERAPEUTIC EXERCISES: CPT | Mod: GP

## 2025-07-17 ASSESSMENT — ENCOUNTER SYMPTOMS
DEPRESSION: 0
LOSS OF SENSATION IN FEET: 0
OCCASIONAL FEELINGS OF UNSTEADINESS: 0

## 2025-07-17 NOTE — PROGRESS NOTES
Physical Therapy Evaluation    Patient Name: Aaron Gilbert  MRN: [id number]  PT Evaluation Time Entry  PT Evaluation (Low) Time Entry: 20  PT Therapeutic Procedures Time Entry  Therapeutic Exercise Time Entry: 15                 Current Problem  1. Neck pain          Insurance    Insurance reviewed   Visit number: 1  MEDICARE/ [employer] [place] [employer] ( $ 904.12 USED ) COPAY 0  ( MET) COVERAGE 80/100 OOP (MET) MOM TO FOLLOW MEDICARE NO AUTH REQ       Subjective   General:  Pt comes in today with complaints of neck pain. She states this has been going on for well over a year. She denies any N/T into UE but she does have pain into her R shoulder blade.  She rates this pain around 6-7/10. She states this is worse in the evening after a long day. She states the pain is sometimes sharp and some times dull. She states she saw Dr. Cobb and he thinks the shoulder pain is coming from her neck. She states nothing makes it better or worse its just always there. Pt denies bowel or bladder changes, denies any new changes in vision. Pt does have HA d/t sinus issues. PMH: DDD, scoliosis, OA, HTN, Ulcers  Occupation: Retired  PLOF: Independent with all activities  Goal for Therapy: Pain Free  Home Environment: House, [address], 1 story, lives with     Precautions:    Pain:  REDUCES SYMPTOMS: None     PROVOKES SYMPTOMS: Always there    Red Flags: Do you have any of the following? Night sweats occasionally, chills  Fever/chills, unexplained weight changes, dizziness/fainting, unexplained change in bowel or bladder functions, unexplained malaise or muscle weakness, , numbness or tingling  Does report night sweats    Reviewed medical screening form with pt and medical screening assessed    Imaging:   IMPRESSION: Xray 12/31/24  Loss of cervical lordosis perhaps from muscle spasm or patient  positioning.      Degenerative disc disease from C4-5 through C6-7 with moderate  cervical spondylosis. Uncovertebral joint spurring as  outlined above.  Objective             PALPATION: TTP Paraspinals C5, C6, C7, TTP R UT, Rhomboids, Levator            POSTURE: Forward head posture    AROM    Cervical flexion: WNL P!    Cervical extension: limited 50% P!    Cervical sidebending Right: limited 50% P!   Sidebending Left: limited 50%    Cervical rotation Right: limited 25% P!    Rotation Left: limited 25%    MMT    Shoulder deltoid flexion right: 4+    Deltoid flexion left: 4+    (C5) Shoulder deltoid abduction right: 4+   Deltoid abduction left: 4+    Shoulder ER @ 0 degrees abduction right: 4+    ER @ 0 degrees abduction left: 4+    Shoulder IR @ 0 degrees abduction right: 4+    IR @ 0 degrees abduction left: 4+    (C6) Biceps brachii right: 5    Biceps brachii left: 5    (C7) Triceps brachii right: 5    Triceps brachii left: 5    (C7) Wrist flexors right: 5      Wrist flexors left: 5      (C6) Wrist extensors right: 5     Wrist extensors left: 5      (C8) Thumb extension right: 5      Thumb extension left: 5      (T1) Finger abduction right: 5      Finger abduction left: 5                    Special Tests  Spurlings:  Right -  Left -  Distraction: Right -  Left -    Rotation Less than 60 degrees to involved side: Right -  Left -  Transverse Ligament Test: Right -  Left -    Outcome Measures:  Other Measures  Neck Disability Index: 28%     Treatment: See HEP below  Measurements taken  Supine chin tuck x10  Seated scap retractions x10  Cervical Isometrics Flex/SB/Rot x5 ea BL  Seated UT stretch 2x30s  EDUCATION/HEP:  Access Code: [email or web address]  URL: [email or web address]  Date: 07/17/2025  Prepared by: Deborah Bradshaw    Exercises  - Seated Scapular Retraction  - 1 x daily - 7 x weekly - 2 sets - 10 reps  - Supine Chin Tuck  - 1 x daily - 7 x weekly - 2 sets - 10 reps  - Standing Isometric Cervical Sidebending with Manual Resistance  - 1 x daily - 7 x weekly - 5 sets - 3-5s hold  - Standing Isometric Cervical Flexion  with Manual Resistance  - 1 x daily - 7 x weekly - 5 sets - 3-5s hold  - Seated Gentle Upper Trapezius Stretch  - 1 x daily - 7 x weekly - 2 sets - 30s hold  Assessment:  69 y/o pt who presents with Neck pain that can radiate into R shoulder blade, dec ROM, dec strength, dec flexibility, and dec functional mobility. Symptoms consistent with muscle imbalance and degenerative changes. Functional limitations include sleeping, ADLs, and self care tasks. Pt denies any N/T, bowel/bladder changes, fever or new changes in vision. Pt able to tolerate all exercises with HEP this date. Pt will benefit from skilled therapy in order to improve pain, ROM, strength, flexibility, and functional mobility.    Clinical Presentation: Stable    Level of Complexity: Low    Goals:  Pt will report dec of 6% on NDI (MDC) in order to improve functional mobility  Pt will be independent with HEP  Pt will demonstrate an increase in neck ROM to WNL in order to return to ADLs  Pt will demonstrate global shoulder strength to 4+/5 in order to return to ADLs  Pt will report dec in pain to 0-1/10 in order to improve functional mobility  Pt will report 75% improvement in function in order to return to ADLs      Plan  Treatment/Interventions: Cryotherapy, Education/ Instruction, Dry needling, Electrical stimulation, Manual therapy, Mechanical traction, Self care/ home management, Therapeutic activities, Neuromuscular re-education, Therapeutic exercises  PT Plan: Skilled PT  PT Frequency: 1 time per week  Duration: 5 weeks  Certification Period Start Date: 07/17/25  Certification Period End Date: 10/15/25  Rehab Potential: Fair  Plan of Care Agreement: Patient

## 2025-07-17 NOTE — PROGRESS NOTES
"Physical Therapy Treatment    Patient Name: Aaron Gilbert  MRN: [id number]  Today's Date: 7/21/2025  Time Calculation  Start Time: 0106  Stop Time: 0145  Time Calculation (min): 39 min     PT Therapeutic Procedures Time Entry  Therapeutic Exercise Time Entry: 26  Manual Therapy Time Entry: 12                 Current Problem  1. Neck pain        2. Shoulder impingement  Follow Up In Physical Therapy      3. Acute pain of right shoulder  Follow Up In Physical Therapy          Insurance:  Visit number: 2  MEDICARE/ [employer] [employer] ( $ 904.12 USED ) COPAY 0  ( MET) COVERAGE 80/100 OOP (MET) MOM TO FOLLOW MEDICARE NO AUTH REQ       Precautions   none    Subjective   Subjective:   Pt reports she has pain in the R shoulder blade, which goes up into her neck. Her pain seems to increase when she looks towards the R side. Pt usually sleeps on her back due to hip issues. Pt reports she has been doing HEP without issue. She has constant pain which gets worse with certain movements.   Pain   7/10    Objective   Treatments:    Supine chin tuck x10  Seated chin  tucks 15x  Seated towel cervical rotations/ ext 5x ea  Seated scap retractions x10  Cervical Isometrics Flex/SB/Rot x5 ea BL  Seated UT stretch 2x30s  Seated B ER iso with strap 5\"x10  Standing rows/ext RTB/YTB 15x ea    Manual: cervical distraction, distraction with slight L SB, passive B UT stretch 12'  OP EDUCATION:     Access Code: [email or web address]  URL: [email or web address]  Date: 07/21/2025  Prepared by: Flor Martínez    Exercises  - Standing Bilateral Low Shoulder Row with Anchored Resistance  - 1 x daily - 7 x weekly - 2 sets - 10 reps  - Shoulder Extension with Resistance  - 1 x daily - 7 x weekly - 2 sets - 10 reps    Assessment:   Pt able to do UT stretch to the L without increased pain. Scap retraction performed without much shoulder blade pain. With seated chin tucks, pt had increased R shoulder blade pain. Tried towel rotations, which " more pain when rotating towards the R side. Ext seemed to increase pain in shoulder blade. In supine, pt seemed to use good form with chin tucks, however, still had a little pain in the R shoulder blade. Pt felt that distraction helped to relieve pain. Tolerated TB rows and ext well.     Plan:   Increase cervical mobility and scapular strength

## 2025-07-21 ENCOUNTER — TREATMENT (OUTPATIENT)
Dept: PHYSICAL THERAPY | Facility: CLINIC | Age: 70
End: 2025-07-21
Payer: MEDICARE

## 2025-07-21 DIAGNOSIS — M25.511 ACUTE PAIN OF RIGHT SHOULDER: ICD-10-CM

## 2025-07-21 DIAGNOSIS — M25.819 SHOULDER IMPINGEMENT: ICD-10-CM

## 2025-07-21 DIAGNOSIS — M54.2 NECK PAIN: Primary | ICD-10-CM

## 2025-07-21 PROCEDURE — 97140 MANUAL THERAPY 1/> REGIONS: CPT | Mod: GP,CQ

## 2025-07-21 PROCEDURE — 97110 THERAPEUTIC EXERCISES: CPT | Mod: GP,CQ

## 2025-07-23 ENCOUNTER — APPOINTMENT (OUTPATIENT)
Dept: PHYSICAL THERAPY | Facility: CLINIC | Age: 70
End: 2025-07-23
Payer: MEDICARE

## 2025-07-28 ENCOUNTER — OFFICE VISIT (OUTPATIENT)
Dept: PAIN MEDICINE | Facility: CLINIC | Age: 70
End: 2025-07-28
Payer: MEDICARE

## 2025-07-28 DIAGNOSIS — G89.29 CHRONIC PAIN OF RIGHT KNEE: Primary | ICD-10-CM

## 2025-07-28 DIAGNOSIS — M25.561 CHRONIC PAIN OF RIGHT KNEE: Primary | ICD-10-CM

## 2025-07-28 PROCEDURE — 1125F AMNT PAIN NOTED PAIN PRSNT: CPT | Performed by: PAIN MEDICINE

## 2025-07-28 PROCEDURE — G2211 COMPLEX E/M VISIT ADD ON: HCPCS | Performed by: PAIN MEDICINE

## 2025-07-28 PROCEDURE — 1159F MED LIST DOCD IN RCRD: CPT | Performed by: PAIN MEDICINE

## 2025-07-28 PROCEDURE — 99214 OFFICE O/P EST MOD 30 MIN: CPT | Performed by: PAIN MEDICINE

## 2025-07-28 PROCEDURE — 99204 OFFICE O/P NEW MOD 45 MIN: CPT | Performed by: PAIN MEDICINE

## 2025-07-28 ASSESSMENT — PAIN SCALES - GENERAL: PAINLEVEL_OUTOF10: 8

## 2025-07-28 ASSESSMENT — PAIN DESCRIPTION - DESCRIPTORS: DESCRIPTORS: ACHING;SHARP;THROBBING;BURNING

## 2025-07-28 ASSESSMENT — PAIN - FUNCTIONAL ASSESSMENT: PAIN_FUNCTIONAL_ASSESSMENT: 0-10

## 2025-07-28 NOTE — PROGRESS NOTES
Patient here for pain in multiple areas. She has right knee pain, right hip pain with bursitis as well as low back pain and neck pain. Patient states she has degenerative disc disease, osteoarthritis and scoliosis. She has had xrays and MRI's done. She has also been seeing physical therapy and a chiropractor

## 2025-07-28 NOTE — H&P
History Of Present Illness  Aaron Gilbert is a 70 y.o. female  Patient here for pain in multiple areas. She has right knee pain, right hip pain with bursitis as well as low back pain and neck pain. Patient states she has degenerative disc disease, osteoarthritis and scoliosis. She has had xrays and MRI's done. She has also been seeing physical therapy and a chiropracto without any relief .  Was seen and evaluated by Dr. Cobb who offered the patient injection in the right knee and the right hip with no significant improvement she is rating currently her pain at the level of 8 out of 10 she does have a good understanding of the problem of the knee and the hip requiring surgical intervention but she would like to put off the surgery at this time was tried on muscle relaxant and over-the-counter nonsteroidal anti-inflammatory without any significant improvement she continues currently to be on Tylenol arthritis over-the-counter with minimal improvement describing the pain as a sharp shooting sensation.  Pain is aggravated during standing or walking.  Cannot describe any alleviating factors for her pain.     Past Medical History  Medical History[1]  Surgical History  Surgical History[2]  Social History  She reports that she quit smoking about 34 years ago. Her smoking use included cigarettes. She has a 30 pack-year smoking history. She has never been exposed to tobacco smoke. She has never used smokeless tobacco. She reports current alcohol use of about 1.0 standard drink of alcohol per week. She reports that she does not use drugs.has a prescription for medical marijuana .    Family History  Family History[3]     Allergies  Allergies[4]  Review of Systems   12 Systems have been reviewed as follows.   Constitutional: Fever, weight gain, weight loss, appetite change, night sweats, fatigue, chills.  Eyes : blurry, double vision, vision, loss, tearing, redness, pain, sensitivity to light, glaucoma.  Ears, nose, mouth,  and throat: Hearing loss, ringing in the ears, ear pain, nasal congestion, nasal drainage, nosebleeds, mouth, throat, irritation tooth problem.  Cardiovascular :chest pain, pressure, heart tracing,palpaitations , sweating, leg swelling, high or low blood pressure  Pulmonary: Cough, yellow or green sputum, blood and sputum, shortness of breath, wheezing  Gastrointestinal: Nause, vomiting, diarrhea, constipation, pain, blood in stool, or vomitus, heartburn, difficulty swallowing  Genitourinary: incontinence, abnormal bleeding, abnormal discharge, urinary frequency, urinary hesitancy, pain, impotence sexual problem, infection, urinary retention  Musculoskeletal: Pain, stiffness, joint, redness or warmth, arthritis, back pain, weakness, muscle wasting, sprain or fracture  Neuro: Weight weakness, dizziness, change in voice, change in taste change in vision, change in hearing, loss, or change of sensation, trouble walking, balance problems coordination problems, shaking, speech problem  Endocrine , cold or heat intolerance, blood sugar problem, weight gain or loss missed periods hot flashes, sweats, change in body hair, change in libido, increased thirst, increased urination  Heme/lymph: Swelling, bleeding, problem anemia, bruising, enlarged lymph nodes  Allergic/immunologic: H. plus nasal drip, watery itchy eyes, nasal drainage, immunosuppressed  The above, were reviewed and noted negative except as noted.    Physical Exam  Vital signs reviewed, documented in chart     General:  Appears well, does not look in any major distress  Alert    HEENT:  Head atraumatic  Eyes normal inspection  PERRL  Normal ENT inspection  No signs of dehydration    NECK:  Normal inspection  Range of motion within normal     RESPIRATORY:  No respiratory distress    CVS:  Heart rate and rhythm regular    ABDOMEN/GI  Soft  Non-tender  No distention  No organomegaly      BACK:  Normal inspection, flexion and extension within normal limit   no  tenderness upon the palpation of the facet joint  Si joints none tender to palpations     EXTREMITIES:  Non-Tender except at the right greater trochanteric bursa site  Full ROM  Normal appearance  No Pedal edema  Power symmetrical , sensory examination preserved.    NEURO:  Alert and oriented X 3  CNS normal as tested without focal neurological deficit   Sensation normal  Motor normal  reflexes normal    PSYCH:  Mood normal  Affect normal    SKIN:  Color normal  No rash  Warm  Dry  no sign of skin marking supportive of IV drug usage /abuse.     Last Recorded Vitals  There were no vitals taken for this visit.  XR knee right 1-2 views  Status: Final result     PACS Images     Show images for XR knee right 1-2 views  Signed by    Signed Time Phone Pager   Joey Cobb MD 7/14/2025 09:55 [phone]      Exam Information    Status Exam Begun Exam Ended   Final 7/14/2025 09:40 7/14/2025 09:42     Study Result    Narrative & Impression   Interpreted By:  oJey Cobb,   STUDY:  XR KNEE RIGHT 1-2 VIEWS; 7/14/2025 9:42 am      INDICATION:  Signs/Symptoms:pain.      ACCESSION NUMBER(S):  [id number]      ORDERING CLINICIAN:  JOEY COBB      FINDINGS:  AP lateral right knee x-ray shows medial joint space narrowing with  bone-on-bone arthrosis medially neutral alignment is maintained.  There is no fracture or dislocation. On the lateral view the  patellofemoral joint is well-aligned. Overall medial joint space  moderate to advanced arthritic change          Signed by: Joey Cobb 7/14/2025 9:55 AM  Dictation workstation:   [id number]     MR hip right wo IV contrast  Status: Final result     PACS Images     Show images for MR hip right wo IV contrast  Signed by    Signed Time Phone Pager   Azeem Douglas MD 2/09/2025 07:00 [phone]     Exam Information    Status Exam Begun Exam Ended   Final 2/08/2025 07:34 2/08/2025 08:02     Study Result    Narrative & Impression   Interpreted By:  Stella  Azeem,   STUDY:  MRI of the  right hip  without IV contrast;  2/8/2025 8:02 am      INDICATION:  Signs/Symptoms:PAIN.      ,M70.61 Trochanteric bursitis, right hip      COMPARISON:  None.      ACCESSION NUMBER(S):  [id number]      ORDERING CLINICIAN:  OSMIN MCDERMOTT      TECHNIQUE:  MR imaging of the  right hip was obtained  without IV contrast.      FINDINGS:  TENDONS:  There is partial-thickness undersurface tearing of the distal right  gluteus medius and right gluteus medius tendons at their insertion on  the greater tuberosity.. The insertion of the iliopsoas tendon is  normal. The hamstring tendons are mildly tendinopathic bilaterally  without evidence of a tear.      JOINTS:  There is moderate cartilage loss with osteophytosis subchondral cyst  formation in the hips bilaterally worse on the left. There is no  large effusion. There is mild degenerative changes the SI joints.  There is moderate severe spondylosis in the lower lumbar spine.      There is mild amount of fluid adjacent to the greater trochanter on  the right consistent with moderate trochanteric bursitis.      OSSEOUS STRUCTURES:  Mild marrow reactive change the greater trochanter at the insertion  of the abductor tendons. There is no fracture. No focal marrow  replacing lesions are identified.      SOFT TISSUES:  No muscle atrophy or tear is seen.      INTERNAL ORGANS:  Evaluation of the internal organs of the pelvis is limited on this  small field of view study tailored for evaluation of the  musculoskeletal system. No gross abnormality is seen in the pelvic  organs.      IMPRESSION:  1. Partial-thickness undersurface tearing of the distal right hip  abductor tendons with marrow reactive changes at the greater  trochanter.  2. Moderate right greater trochanteric bursitis  3. Moderate bilateral hip osteoarthritis worse on the left  4. Mild tendinopathy of the bilateral hamstring tendon origin.          I personally reviewed the images/study  and I agree with the findings  as stated. This study was interpreted at [facility], [place], [place].      MACRO:  None      Signed by: Azeem Douglas 2/9/2025 7:00 AM  Dictation workstation:   [id number]     Assessment/Plan   70 years old with history and physical examination supportive of right knee pain and right hip pain tried and failed conservative management      Knowing that the patient has tried the physical therapy nonsteroidal anti-inflammatory and the conservative management and she had persistent pain I would recommend a right genicular nerve block to be performed under fluoroscopic guidance if the patient described positive response at that time could proceed with the denervation with a radiofrequency ablation of the right genicular nerves after done with the knee we could start tackling in the hip by offering her a greater trochanteric bursa injection around the right greater trochanter bursa benefits and risk of the procedure were discussed with the patient and she would like to proceed      The above clinical summary has been dictated with voice recognition software. It has not been proofread for grammatical errors, typographical mistakes, or other semantic inconsistencies.    Thank you for visiting our office today. It was our pleasure to take part in your healthcare.     Please do not hesitate to contact the pain clinic after your visit with any questions or concerns at  M-F 8-4 pm       Sami Choi M.D.  Medical Director , Division of Pain Medicine [facility]   of Anesthesiology and Pain Medicine  [facility]     [place] [address] [address] [place] [address]     Office: [phone]  Fax: [phone]      Sami Choi MD       [1]   Past Medical  History:  Diagnosis Date    Allergic     Allergy status to unspecified drugs, medicaments and biological substances     History of seasonal allergies    Anxiety     Arthritis     Bursitis of hip     Bursitis of shoulder     Cataract     Cervical disc disorder     Depression     GERD (gastroesophageal reflux disease)     Headache     Hypertension     Peptic ulceration     Personal history of other diseases of the circulatory system 06/28/2021    History of hypertension    Personal history of other diseases of the digestive system     History of gastroesophageal reflux (GERD)    Personal history of other diseases of the musculoskeletal system and connective tissue 06/28/2021    History of arthritis    Personal history of other diseases of the respiratory system     History of sinusitis    Personal history of other infectious and parasitic diseases     History of Helicobacter pylori infection    Scoliosis     Unspecified visual loss 06/28/2021    Vision problems    Urinary tract infection    [2]   Past Surgical History:  Procedure Laterality Date    BACK SURGERY      CATARACT EXTRACTION Right 11/14/2024    CATARACT EXTRACTION Left 10/14/2024    OTHER SURGICAL HISTORY  06/28/2021    Tonsillectomy    OTHER SURGICAL HISTORY  06/28/2021    Back surgery    OTHER SURGICAL HISTORY  05/28/2019    Esophagogastroduodenoscopy    OTHER SURGICAL HISTORY  05/28/2019    Complete colonoscopy    SINUS SURGERY      TUBAL LIGATION     [3]   Family History  Problem Relation Name Age of Onset    Arthritis Mother No name     Blood clot Mother No name     Clotting disorder Mother No name     Vision loss Mother No name     Hypertension Father No name     Hearing loss Brother No name     Alcohol abuse Father's Brother No name     Hypertension Other      Allergic rhinitis Mother No name     Depression Sister Becka    [4] No Known Allergies

## 2025-07-30 ENCOUNTER — TREATMENT (OUTPATIENT)
Dept: PHYSICAL THERAPY | Facility: CLINIC | Age: 70
End: 2025-07-30
Payer: MEDICARE

## 2025-07-30 DIAGNOSIS — M25.511 ACUTE PAIN OF RIGHT SHOULDER: ICD-10-CM

## 2025-07-30 DIAGNOSIS — M25.819 SHOULDER IMPINGEMENT: ICD-10-CM

## 2025-07-30 PROCEDURE — 97110 THERAPEUTIC EXERCISES: CPT | Mod: GP

## 2025-07-30 PROCEDURE — 97140 MANUAL THERAPY 1/> REGIONS: CPT | Mod: GP

## 2025-07-30 NOTE — PROGRESS NOTES
"Physical Therapy Treatment    Patient Name: Aaron Gilbert  MRN: [id number]  Today's Date: 7/30/2025  Time Calculation  Start Time: 1044  Stop Time: 1123  Time Calculation (min): 39 min     PT Therapeutic Procedures Time Entry  Therapeutic Exercise Time Entry: 29  Manual Therapy Time Entry: 10                 Current Problem  1. Shoulder impingement  Follow Up In Physical Therapy      2. Acute pain of right shoulder  Follow Up In Physical Therapy            Insurance:  Visit number: 3  MEDICARE/ [employer] [place] 2410 ( $ 904.12 USED ) COPAY 0  ( MET) COVERAGE 80/100 OOP (MET) MOM TO FOLLOW MEDICARE NO AUTH REQ       Precautions   none    Subjective   Subjective:  Pt states her pain is improved today. She states it is down to a 5/10 this date.  Pain   5/10    Objective   Treatments:    Supine chin tuck x15  Open Books x10 to L  Seated chin  tucks 15x  Seated towel cervical rotations/ ext x10  Seated scap retractions 2x10  Cervical Isometrics Flex/SB/Rot x5 ea BL  Seated thoracic extension w/pec stretch x10  Seated UT stretch 2x30s  Seated B ER iso with strap 5\"x10  Standing rows/ext RTB 15x ea  Seated H.Abd YTB 2x10  Seated Foam roll thoracic extension x15      Manual: cervical distraction, STM BL UT, paraspinals 10'  OP EDUCATION:  Access Code: [email or web address]  URL: [email or web address]  Date: 07/30/2025  Prepared by: Deborah Bradshaw    Exercises  - Sidelying Thoracic Rotation with Open Book  - 1 x daily - 7 x weekly - 1 sets - 10 reps  - Seated Thoracic Lumbar Extension with Pectoralis Stretch  - 1 x daily - 7 x weekly - 1 sets - 10 reps  - Seated Shoulder Horizontal Abduction with Resistance  - 1 x daily - 3-4 x weekly - 2 sets - 10 reps    Assessment:  Pt tolerated treatment session well today. Introduced several exercises for thoracic mobility this date including open books, seated thoracic extension and foam roller roll outs. Pt had a difficult time keeping her R shoulder down when " performing rollouts but able to improve with cues. Added H.Abd with YTB for scapular strenghtening with good tolerance. Pt will continue to benefit from skilled therapy in order to reach her goals.    Plan:  Continue to progress as tolerated with focus on mobility of cervical and thoracic spine, scapular strength

## 2025-08-05 ENCOUNTER — APPOINTMENT (OUTPATIENT)
Dept: CARDIOLOGY | Facility: CLINIC | Age: 70
End: 2025-08-05
Payer: MEDICARE

## 2025-08-05 VITALS
DIASTOLIC BLOOD PRESSURE: 74 MMHG | BODY MASS INDEX: 29.51 KG/M2 | HEIGHT: 63 IN | HEART RATE: 84 BPM | SYSTOLIC BLOOD PRESSURE: 132 MMHG

## 2025-08-05 DIAGNOSIS — R00.2 PALPITATIONS: ICD-10-CM

## 2025-08-05 DIAGNOSIS — R93.1 ELEVATED CORONARY ARTERY CALCIUM SCORE: ICD-10-CM

## 2025-08-05 DIAGNOSIS — R06.02 SHORTNESS OF BREATH: ICD-10-CM

## 2025-08-05 DIAGNOSIS — R00.1 BRADYCARDIA: ICD-10-CM

## 2025-08-05 DIAGNOSIS — I10 ESSENTIAL (PRIMARY) HYPERTENSION: ICD-10-CM

## 2025-08-05 PROCEDURE — 3078F DIAST BP <80 MM HG: CPT | Performed by: INTERNAL MEDICINE

## 2025-08-05 PROCEDURE — 3075F SYST BP GE 130 - 139MM HG: CPT | Performed by: INTERNAL MEDICINE

## 2025-08-05 PROCEDURE — 1159F MED LIST DOCD IN RCRD: CPT | Performed by: INTERNAL MEDICINE

## 2025-08-05 PROCEDURE — 99214 OFFICE O/P EST MOD 30 MIN: CPT | Performed by: INTERNAL MEDICINE

## 2025-08-05 RX ORDER — NAPROXEN SODIUM 220 MG/1
81 TABLET, FILM COATED ORAL DAILY
Start: 2025-08-05 | End: 2026-08-05

## 2025-08-05 RX ORDER — ATORVASTATIN CALCIUM 10 MG/1
10 TABLET, FILM COATED ORAL DAILY
Qty: 90 TABLET | Refills: 3 | Status: SHIPPED | OUTPATIENT
Start: 2025-08-05 | End: 2026-08-05

## 2025-08-05 NOTE — PROGRESS NOTES
Patient:  Aaron Gilbert  YOB: 1955  MRN: [id number]     Chief complaint:   Chief Complaint   Patient presents with    Results     Results of echocardiogram, Zio patch, stress test, and cardiac calcium score test.        Chief Complaint/Active Symptoms:       Aaron Gilbert is a 70 y.o. female who returns today for cardiac follow-up.  Patient is here to review testing.  Patient had some arrhythmia complaints in the past.  We did have her wear a Holter monitor showing some PACs and PVCs but no sustained arrhythmias and no meaningful arrhythmias according to the interpretation.  She also had an echocardiogram showed normal LV function with mild MR TR and AI.  Her perfusion scan shows no evidence of ischemia.  Calcium score is 46.  Based on the above we discussed the presence of early coronary disease and therefore will start baby aspirin and low-dose statin in the form of atorvastatin 10 daily.  She can see me at 6-month intervals and repeat studies as necessary or in 2 years.  Vitals are normal.  Exam is unchanged.      Objective:     Vitals:    08/05/25 1313   BP: 132/74   Pulse: 84       There were no vitals filed for this visit.    Allergies:     No Known Allergies       Medications:     Current Outpatient Medications   Medication Instructions    busPIRone (BUSPAR) 15 mg, oral, 3 times daily    estradiol (Estrace) 0.01 % (0.1 mg/gram) vaginal cream APPLY A PEA-SIZED AMOUNT DAILY    FLUoxetine (PROZAC) 10 mg, oral, Daily    FLUoxetine (PROZAC) 20 mg, oral, Daily    fluticasone (Flonase) 50 mcg/actuation nasal spray 2 sprays, Each Nostril, 2 times daily, Shake gently. Before first use, prime pump. After use, clean tip and replace cap. Use one hour before bedtime.    montelukast (SINGULAIR) 10 mg, oral, Daily    pantoprazole (PROTONIX) 40 mg, oral, Daily before breakfast    sucralfate (CARAFATE) 1 g, oral, 3 times daily before meals    valsartan (DIOVAN) 160 mg, oral, Daily       Physical Examination:    Constitutional:       Appearance: Healthy appearance. Not in distress.   Neck:      Vascular: No JVR. JVD normal.   Pulmonary:      Effort: Pulmonary effort is normal.      Breath sounds: Normal breath sounds. No wheezing. No rhonchi. No rales.   Chest:      Chest wall: Not tender to palpatation.   Cardiovascular:      PMI at left midclavicular line. Normal rate. Regular rhythm. Normal S1. Normal S2.       Murmurs: There is no murmur.      No gallop.  No click. No rub.   Pulses:     Intact distal pulses.   Edema:     Peripheral edema absent.   Abdominal:      General: Bowel sounds are normal.      Palpations: Abdomen is soft.      Tenderness: There is no abdominal tenderness.   Musculoskeletal: Normal range of motion.         General: No tenderness. Skin:     General: Skin is warm and dry.   Neurological:      General: No focal deficit present.      Mental Status: Alert and oriented to person, place and time.          Lab:     CBC:   Lab Results   Component Value Date    WBC 5.9 10/01/2024    RBC 4.09 10/01/2024    HGB 13.0 10/01/2024    HCT 38.8 10/01/2024     10/01/2024        CMP:    Lab Results   Component Value Date     10/01/2024    K 3.7 10/01/2024     10/01/2024    CO2 26 10/01/2024    BUN 13 10/01/2024    CREATININE 0.78 10/01/2024    GLUCOSE 78 10/01/2024    CALCIUM 8.9 10/01/2024       Magnesium:    Lab Results   Component Value Date    MG 1.87 10/01/2024       Lipid Profile:    Lab Results   Component Value Date    TRIG 70 07/28/2023    HDL 56.6 07/28/2023       TSH:    Lab Results   Component Value Date    TSH 0.87 09/10/2024       BNP:   Lab Results   Component Value Date    BNP 32 10/01/2024        PT/INR:    Lab Results   Component Value Date    PROTIME 12.0 10/01/2024    INR 1.1 10/01/2024       HgBA1c:    Lab Results   Component Value Date    HGBA1C 5.1 01/10/2022       BMP:  Lab Results   Component Value Date     10/01/2024     09/10/2024     (L) 07/04/2024     K 3.7 10/01/2024    K 4.2 09/10/2024    K 3.2 (L) 07/04/2024     10/01/2024     09/10/2024     07/04/2024    CO2 26 10/01/2024    CO2 28 09/10/2024    CO2 24 07/04/2024    BUN 13 10/01/2024    BUN 10 09/10/2024    BUN 21 07/04/2024    CREATININE 0.78 10/01/2024    CREATININE 0.81 09/10/2024    CREATININE 0.81 07/04/2024       CBC:  Lab Results   Component Value Date    WBC 5.9 10/01/2024    WBC 4.4 09/10/2024    WBC 9.3 07/04/2024    RBC 4.09 10/01/2024    RBC 4.05 09/10/2024    RBC 3.94 (L) 07/04/2024    HGB 13.0 10/01/2024    HGB 12.9 09/10/2024    HGB 12.8 07/04/2024    HCT 38.8 10/01/2024    HCT 40.8 09/10/2024    HCT 36.9 07/04/2024    MCV 95 10/01/2024     (H) 09/10/2024    MCV 94 07/04/2024    MCH 31.8 10/01/2024    MCH 31.9 09/10/2024    MCH 32.5 07/04/2024    MCHC 33.5 10/01/2024    MCHC 31.6 (L) 09/10/2024    MCHC 34.7 07/04/2024    RDW 12.5 10/01/2024    RDW 13.1 09/10/2024    RDW 12.6 07/04/2024     10/01/2024     09/10/2024     07/04/2024       Cardiac Enzymes:    Lab Results   Component Value Date    TROPHS <3 10/01/2024    TROPHS <3 10/01/2024    TROPHS 4 07/05/2024       Hepatic Function Panel:    Lab Results   Component Value Date    ALKPHOS 56 10/01/2024    ALT 8 10/01/2024    AST 12 10/01/2024    PROT 6.4 10/01/2024    BILITOT 1.1 10/01/2024    BILIDIR 0.2 07/28/2023         Diagnostic Studies:     XR knee right 1-2 views  Result Date: 7/14/2025  Interpreted By:  Joey Cobb, STUDY: XR KNEE RIGHT 1-2 VIEWS; 7/14/2025 9:42 am   INDICATION: Signs/Symptoms:pain.   ACCESSION NUMBER(S): [id number]   ORDERING CLINICIAN: JOEY COBB   FINDINGS: AP lateral right knee x-ray shows medial joint space narrowing with bone-on-bone arthrosis medially neutral alignment is maintained. There is no fracture or dislocation. On the lateral view the patellofemoral joint is well-aligned. Overall medial joint space moderate to advanced arthritic change     Signed  "by: Joey Cobb 7/14/2025 9:55 AM Dictation workstation:   [id number]      EKG:   No results found for: \"EKG\"      Radiology:     No orders to display       Problem List:     Patient Active Problem List   Diagnosis    Abdominal bloating    Abdominal pain    Abdominal weakness    Achilles tendinitis of right lower extremity    Anxiety    Atrophy of vagina    Bursitis of hip    Degenerative arthritis of lumbar spine    Depression    Hypokalemia    Irritable bowel syndrome with predominant constipation    Left knee pain    IT band syndrome    Lower back pain    Postural instability of trunk    Recurrent UTI    Rotator cuff tear    Shoulder impingement    Spondylolisthesis    Trochanteric bursitis of left hip    Urinary urgency    Vaginal hemorrhage    Seasonal allergies    Sacroiliitis    Primary osteoarthritis involving multiple joints    Palpitations    Kidney stones    Hypertrophy of nasal turbinates    Gastroesophageal reflux disease without esophagitis    Appendicitis    Overweight with body mass index (BMI) of 29 to 29.9 in adult    Dysuria    Essential (primary) hypertension    History of Helicobacter pylori infection    History of hypertension    Personal history of COVID-19    Impacted cerumen of right ear    Vision disorder    Weakness    Acute pain of right shoulder    Sinus headache    Cervicalgia    Migraine without aura and without status migrainosus, not intractable    Bradycardia    Extensor carpi ulnaris tendinitis    Other cervical disc degeneration, unspecified cervical region    Spinal stenosis, lumbar region with neurogenic claudication    Postlaminectomy syndrome, not elsewhere classified    Acute recurrent pansinusitis    Shortness of breath    Bursitis of right hip    Hip abductor tendinitis, right    Chronic rhinosinusitis    Anti-pneumococcal polysaccharide antibody deficiency (Multi)    Neck pain         ASSESSMENT       70-year-old female here for routine follow-up and testing " results.    Meds, vitals, examination as noted.    Chart review details cussed the patient at length.    Pression:  Palpitations  Normal echo with normal LV function  Mild MR TR AI  Normal myocardial perfusion  Mild CAD based on calcium score 46  No substantial arrhythmias on monitoring with PACs and PVCs  Chronic allergies  Essential hypertension  PLAN   Recommendation:  Begin aspirin and low-dose statin  Check patient back in about 6 months  Call if any problems or issues  Follow with primary care  No additional medical management at this time with usual meds to continue and follow-up with primary care physician  Low overall cardiovascular risk based on testing results      IPenny RN  am scribing for, and in the presence of Dr. Doron Meza DO .    I, Dr. Doron Meza DO , personally performed the services described in the documentation as scribed by Penny Obrien RN  in my presence, and confirm it is both accurate and complete.    Dr. Doron Meza DO  Thank you for allowing me to participate in the care of this patient. Please do not hesitate to contact me with any further questions or concerns.

## 2025-08-05 NOTE — PATIENT INSTRUCTIONS
Continue same medications/treatment.  Patient educated on proper medication use.  Patient educated on risk factor modification.  Please bring any lab results from other providers/physicians to your next appointment.    Please bring all medicines, vitamins, and herbal supplements with you when you come to the office.    Prescriptions will not be filled unless you are compliant with your follow up appointments or have a follow up appointment scheduled as per instruction of your physician. Refills should be requested at the time of your visit.    Follow up in 6 months  Start Lipitor 10 mg daily at bedtime   Start Aspirin 81 mg daily     IHAYLIE RN, AM SCRIBING FOR AND IN THE PRESENCE OF DR. SHILA RICHARDSON, DO, FACC

## 2025-08-06 ENCOUNTER — APPOINTMENT (OUTPATIENT)
Dept: PHYSICAL THERAPY | Facility: CLINIC | Age: 70
End: 2025-08-06
Payer: MEDICARE

## 2025-08-06 DIAGNOSIS — K21.9 GASTROESOPHAGEAL REFLUX DISEASE WITHOUT ESOPHAGITIS: Primary | ICD-10-CM

## 2025-08-07 ENCOUNTER — TREATMENT (OUTPATIENT)
Dept: PHYSICAL THERAPY | Facility: CLINIC | Age: 70
End: 2025-08-07
Payer: MEDICARE

## 2025-08-07 DIAGNOSIS — B37.31 VAGINAL CANDIDIASIS: Primary | ICD-10-CM

## 2025-08-07 DIAGNOSIS — M25.819 SHOULDER IMPINGEMENT: Primary | ICD-10-CM

## 2025-08-07 DIAGNOSIS — M25.511 ACUTE PAIN OF RIGHT SHOULDER: ICD-10-CM

## 2025-08-07 PROCEDURE — 97110 THERAPEUTIC EXERCISES: CPT | Mod: GP,CQ

## 2025-08-07 PROCEDURE — 97140 MANUAL THERAPY 1/> REGIONS: CPT | Mod: GP,CQ

## 2025-08-07 RX ORDER — PANTOPRAZOLE SODIUM 40 MG/1
40 TABLET, DELAYED RELEASE ORAL
Qty: 90 TABLET | Refills: 0 | Status: SHIPPED | OUTPATIENT
Start: 2025-08-07

## 2025-08-07 ASSESSMENT — PAIN SCALES - GENERAL: PAINLEVEL_OUTOF10: 7

## 2025-08-07 ASSESSMENT — PAIN - FUNCTIONAL ASSESSMENT: PAIN_FUNCTIONAL_ASSESSMENT: 0-10

## 2025-08-07 ASSESSMENT — PAIN DESCRIPTION - DESCRIPTORS: DESCRIPTORS: ACHING;SHARP

## 2025-08-07 NOTE — PROGRESS NOTES
"Physical Therapy Treatment    Patient Name: Aaron Gilbert  MRN: [id number]  Today's Date: 8/7/2025  Time Calculation  Start Time: 0920  Stop Time: 1000  Time Calculation (min): 40 min     PT Therapeutic Procedures Time Entry  Therapeutic Exercise Time Entry: 30  Manual Therapy Time Entry: 10                 Insurance:   Visit number: 4  MEDICARE/ [employer] ( $ 904.12 USED ) COPAY 0  ( MET) COVERAGE 80/100 OOP (MET) MOM TO FOLLOW MEDICARE NO AUTH REQ       Assessment:   Pt w/ mod tightness along R sided cervical p/s and Upper trap w/ manual performed. Gentle cervical distraction performed this visit w/ pt able to tolerate. Cont'd w/ given ex's w/ emphasis on decreasing UT elevation and scap protraction. She demo's good understanding of ex's to work on at this time and will cont to benefit from skilled PT to address her deficits. Ed her on importance of postural awareness throughout her day.     Plan:   Continue to progress as tolerated with focus on mobility of cervical and thoracic spine, scapular strength     Current Problem  1. Shoulder impingement  Follow Up In Physical Therapy      2. Acute pain of right shoulder  Follow Up In Physical Therapy          Subjective   General   Pt states her pain cont'd to be elevated but is \"about the same\" as before. Pt compliant w/ HEP 1x/day. She experiences occasional \"sharp\" pain in shoulder/upper trap when moving neck.    Precautions       Pain  Pain Assessment: 0-10  0-10 (Numeric) Pain Score: 7  Pain Type: Chronic pain  Pain Location: Neck  Pain Orientation: Right  Pain Descriptors: Aching, Sharp  Pain Interventions: Home medication (Tylenol Arthritis)    Objective       Treatments:  Therapeutic Exercise (03229):   Supine chin tuck x15  Open Books x10  BL  Seated chin  tucks 15x  Seated towel cervical rotations/ ext x10  Seated scap retractions 2x10  Cervical Isometrics Flex/SB/Rot x5 ea BL  Seated thoracic extension w/pec stretch x10  Seated UT stretch " "2x30s R only  Seated B ER iso with strap 5\"x10  Standing rows/ext RTB 15x ea  Seated H.Abd YTB 2x10  Seated Foam roll thoracic extension x15     Manual (57189):  cervical distraction, STM BL UT, paraspinals 10'   EDUCATION:         "

## 2025-08-09 RX ORDER — FLUCONAZOLE 150 MG/1
TABLET ORAL
Qty: 2 TABLET | Refills: 0 | Status: SHIPPED | OUTPATIENT
Start: 2025-08-09

## 2025-08-11 ENCOUNTER — TELEPHONE (OUTPATIENT)
Dept: CARDIOLOGY | Facility: CLINIC | Age: 70
End: 2025-08-11
Payer: MEDICARE

## 2025-08-11 DIAGNOSIS — R51.9 SINUS HEADACHE: Primary | ICD-10-CM

## 2025-08-11 DIAGNOSIS — J32.9 CHRONIC RHINOSINUSITIS: ICD-10-CM

## 2025-08-13 ENCOUNTER — TREATMENT (OUTPATIENT)
Dept: PHYSICAL THERAPY | Facility: CLINIC | Age: 70
End: 2025-08-13
Payer: MEDICARE

## 2025-08-13 DIAGNOSIS — M54.2 NECK PAIN: Primary | ICD-10-CM

## 2025-08-13 PROCEDURE — 97140 MANUAL THERAPY 1/> REGIONS: CPT | Mod: GP

## 2025-08-13 PROCEDURE — 97110 THERAPEUTIC EXERCISES: CPT | Mod: GP

## 2025-08-19 ENCOUNTER — HOSPITAL ENCOUNTER (OUTPATIENT)
Dept: PAIN MEDICINE | Facility: CLINIC | Age: 70
Discharge: HOME | End: 2025-08-19
Payer: MEDICARE

## 2025-08-19 VITALS
OXYGEN SATURATION: 97 % | TEMPERATURE: 97 F | HEART RATE: 53 BPM | DIASTOLIC BLOOD PRESSURE: 70 MMHG | RESPIRATION RATE: 16 BRPM | SYSTOLIC BLOOD PRESSURE: 149 MMHG

## 2025-08-19 DIAGNOSIS — G89.29 CHRONIC PAIN OF RIGHT KNEE: ICD-10-CM

## 2025-08-19 DIAGNOSIS — M25.561 CHRONIC PAIN OF RIGHT KNEE: ICD-10-CM

## 2025-08-19 PROCEDURE — 64454 NJX AA&/STRD GNCLR NRV BRNCH: CPT | Performed by: PAIN MEDICINE

## 2025-08-19 PROCEDURE — 7100000010 HC PHASE TWO TIME - EACH INCREMENTAL 1 MINUTE

## 2025-08-19 PROCEDURE — 7100000009 HC PHASE TWO TIME - INITIAL BASE CHARGE

## 2025-08-19 PROCEDURE — 2500000004 HC RX 250 GENERAL PHARMACY W/ HCPCS (ALT 636 FOR OP/ED): Mod: JW | Performed by: PAIN MEDICINE

## 2025-08-19 RX ORDER — METHYLPREDNISOLONE ACETATE 80 MG/ML
INJECTION, SUSPENSION INTRA-ARTICULAR; INTRALESIONAL; INTRAMUSCULAR; SOFT TISSUE AS NEEDED
Status: COMPLETED | OUTPATIENT
Start: 2025-08-19 | End: 2025-08-19

## 2025-08-19 RX ORDER — BUPIVACAINE HYDROCHLORIDE 5 MG/ML
INJECTION, SOLUTION EPIDURAL; INTRACAUDAL; PERINEURAL AS NEEDED
Status: COMPLETED | OUTPATIENT
Start: 2025-08-19 | End: 2025-08-19

## 2025-08-19 RX ORDER — LIDOCAINE HYDROCHLORIDE 10 MG/ML
INJECTION, SOLUTION EPIDURAL; INFILTRATION; INTRACAUDAL; PERINEURAL AS NEEDED
Status: COMPLETED | OUTPATIENT
Start: 2025-08-19 | End: 2025-08-19

## 2025-08-19 RX ADMIN — METHYLPREDNISOLONE ACETATE 80 MG: 80 INJECTION, SUSPENSION INTRA-ARTICULAR; INTRALESIONAL; INTRAMUSCULAR; SOFT TISSUE at 13:23

## 2025-08-19 RX ADMIN — BUPIVACAINE HYDROCHLORIDE 10 ML: 5 INJECTION, SOLUTION EPIDURAL; INTRACAUDAL; PERINEURAL at 13:23

## 2025-08-19 RX ADMIN — LIDOCAINE HYDROCHLORIDE 3 ML: 10 INJECTION, SOLUTION EPIDURAL; INFILTRATION; INTRACAUDAL; PERINEURAL at 13:21

## 2025-08-19 ASSESSMENT — PAIN SCALES - GENERAL: PAINLEVEL_OUTOF10: 6

## 2025-08-19 ASSESSMENT — PAIN - FUNCTIONAL ASSESSMENT: PAIN_FUNCTIONAL_ASSESSMENT: 0-10

## 2025-08-19 ASSESSMENT — PAIN DESCRIPTION - DESCRIPTORS: DESCRIPTORS: BURNING

## 2025-08-20 ENCOUNTER — TELEPHONE (OUTPATIENT)
Dept: PAIN MEDICINE | Facility: CLINIC | Age: 70
End: 2025-08-20

## 2025-08-20 ENCOUNTER — OFFICE VISIT (OUTPATIENT)
Dept: ORTHOPEDIC SURGERY | Facility: CLINIC | Age: 70
End: 2025-08-20
Payer: MEDICARE

## 2025-08-20 DIAGNOSIS — F32.A DEPRESSION, UNSPECIFIED DEPRESSION TYPE: ICD-10-CM

## 2025-08-20 DIAGNOSIS — M76.891 HIP ABDUCTOR TENDINITIS, RIGHT: Primary | ICD-10-CM

## 2025-08-20 DIAGNOSIS — M25.561 CHRONIC PAIN OF RIGHT KNEE: Primary | ICD-10-CM

## 2025-08-20 DIAGNOSIS — G89.29 CHRONIC PAIN OF RIGHT KNEE: Primary | ICD-10-CM

## 2025-08-20 PROCEDURE — 99212 OFFICE O/P EST SF 10 MIN: CPT | Performed by: ORTHOPAEDIC SURGERY

## 2025-08-20 PROCEDURE — 99213 OFFICE O/P EST LOW 20 MIN: CPT | Performed by: ORTHOPAEDIC SURGERY

## 2025-08-20 RX ORDER — FLUOXETINE 20 MG/1
20 CAPSULE ORAL DAILY
Qty: 90 CAPSULE | Refills: 3 | Status: SHIPPED | OUTPATIENT
Start: 2025-08-20

## 2025-08-20 RX ORDER — FLUOXETINE 10 MG/1
10 CAPSULE ORAL DAILY
Qty: 90 CAPSULE | Refills: 3 | Status: SHIPPED | OUTPATIENT
Start: 2025-08-20

## 2025-08-30 ENCOUNTER — OFFICE VISIT (OUTPATIENT)
Age: 70
End: 2025-08-30
Payer: MEDICARE

## 2025-08-30 VITALS
BODY MASS INDEX: 28.35 KG/M2 | WEIGHT: 160 LBS | SYSTOLIC BLOOD PRESSURE: 108 MMHG | HEIGHT: 63 IN | OXYGEN SATURATION: 95 % | RESPIRATION RATE: 16 BRPM | TEMPERATURE: 98.5 F | HEART RATE: 75 BPM | DIASTOLIC BLOOD PRESSURE: 71 MMHG

## 2025-08-30 DIAGNOSIS — R09.81 SINUS CONGESTION: ICD-10-CM

## 2025-08-30 DIAGNOSIS — J01.11 ACUTE RECURRENT FRONTAL SINUSITIS: ICD-10-CM

## 2025-08-30 DIAGNOSIS — R35.0 URINE FREQUENCY: Primary | ICD-10-CM

## 2025-08-30 LAB
POC APPEARANCE, URINE: ABNORMAL
POC BILIRUBIN, URINE: NEGATIVE
POC BLOOD, URINE: NEGATIVE
POC COLOR, URINE: ABNORMAL
POC CORONAVIRUS SARS-COV-2 PCR: NEGATIVE
POC GLUCOSE, URINE: NEGATIVE MG/DL
POC HUMAN RHINOVIRUS PCR: NEGATIVE
POC INFLUENZA A VIRUS PCR: NEGATIVE
POC INFLUENZA B VIRUS PCR: NEGATIVE
POC KETONES, URINE: NEGATIVE MG/DL
POC LEUKOCYTES, URINE: NEGATIVE
POC NITRITE,URINE: NEGATIVE
POC PH, URINE: 6 PH
POC PROTEIN, URINE: NEGATIVE MG/DL
POC RESPIRATORY SYNCYTIAL VIRUS PCR: NEGATIVE
POC SPECIFIC GRAVITY, URINE: <=1.005
POC UROBILINOGEN, URINE: 0.2 EU/DL

## 2025-08-30 RX ORDER — AMOXICILLIN 875 MG/1
875 TABLET, COATED ORAL 2 TIMES DAILY
Qty: 10 TABLET | Refills: 0 | Status: SHIPPED | OUTPATIENT
Start: 2025-08-30 | End: 2025-09-04

## 2025-08-30 ASSESSMENT — ENCOUNTER SYMPTOMS
FEVER: 0
RESPIRATORY NEGATIVE: 1
MUSCULOSKELETAL NEGATIVE: 1
ENDOCRINE NEGATIVE: 1
EYES NEGATIVE: 1
CARDIOVASCULAR NEGATIVE: 1
PSYCHIATRIC NEGATIVE: 1
DYSURIA: 1
SINUS PRESSURE: 1
CHILLS: 1
ALLERGIC/IMMUNOLOGIC NEGATIVE: 1
GASTROINTESTINAL NEGATIVE: 1
HEMATOLOGIC/LYMPHATIC NEGATIVE: 1
SINUS COMPLAINT: 1
SINUS PAIN: 1
HEADACHES: 0

## 2025-09-03 DIAGNOSIS — K21.9 GASTROESOPHAGEAL REFLUX DISEASE WITHOUT ESOPHAGITIS: ICD-10-CM

## 2025-09-04 RX ORDER — PANTOPRAZOLE SODIUM 40 MG/1
40 TABLET, DELAYED RELEASE ORAL
Qty: 90 TABLET | Refills: 0 | Status: SHIPPED | OUTPATIENT
Start: 2025-09-04

## 2025-09-05 ENCOUNTER — APPOINTMENT (OUTPATIENT)
Dept: RADIOLOGY | Facility: HOSPITAL | Age: 70
End: 2025-09-05
Payer: MEDICARE

## 2025-09-09 ENCOUNTER — APPOINTMENT (OUTPATIENT)
Dept: PRIMARY CARE | Facility: CLINIC | Age: 70
End: 2025-09-09
Payer: MEDICARE

## 2025-09-10 ENCOUNTER — APPOINTMENT (OUTPATIENT)
Dept: RADIOLOGY | Facility: HOSPITAL | Age: 70
End: 2025-09-10
Payer: MEDICARE

## 2025-09-12 ENCOUNTER — APPOINTMENT (OUTPATIENT)
Dept: NEUROLOGY | Facility: CLINIC | Age: 70
End: 2025-09-12
Payer: MEDICARE

## 2025-09-17 ENCOUNTER — APPOINTMENT (OUTPATIENT)
Dept: ALLERGY | Facility: CLINIC | Age: 70
End: 2025-09-17
Payer: MEDICARE

## 2025-09-29 ENCOUNTER — APPOINTMENT (OUTPATIENT)
Dept: PRIMARY CARE | Facility: CLINIC | Age: 70
End: 2025-09-29
Payer: MEDICARE

## 2025-10-15 ENCOUNTER — APPOINTMENT (OUTPATIENT)
Dept: PRIMARY CARE | Facility: CLINIC | Age: 70
End: 2025-10-15
Payer: MEDICARE

## 2025-10-27 ENCOUNTER — APPOINTMENT (OUTPATIENT)
Dept: CARDIOLOGY | Facility: CLINIC | Age: 70
End: 2025-10-27
Payer: MEDICARE

## 2026-02-05 ENCOUNTER — APPOINTMENT (OUTPATIENT)
Dept: CARDIOLOGY | Facility: CLINIC | Age: 71
End: 2026-02-05
Payer: MEDICARE

## (undated) DEVICE — ELECTRODE PT RET AD L9FT HI MOIST COND ADH HYDRGEL CORDED

## (undated) DEVICE — COVER EQUIP L32XW30IN FORXRAY TUBE OEC 8800 9600 9800 C ARM

## (undated) DEVICE — SPLINT 1524050 5PK PAIR DOYLE II AIRWAY: Brand: DOYLE II ™

## (undated) DEVICE — CATHETER SNUS BLLN L16MM DIA6MM HI PERF DIL INT SNUS IRRIG

## (undated) DEVICE — GLOVE RAD SZ 8 L11.85IN THK9MIL IVRY POLYCHLOROPRENE SMOOTH

## (undated) DEVICE — CODMAN® SURGICAL PATTIES 1/2" X 3" (1.27CM X 7.62CM): Brand: CODMAN®

## (undated) DEVICE — SUTURE CHROMIC GUT SZ 4-0 L18IN ABSRB BRN L13MM P-3 3/8 CIR 1654G

## (undated) DEVICE — Z CONVERTED USE 2271045 CONTAINER SPEC 4OZ W/ SCR LID

## (undated) DEVICE — CATHETER ENDOSCP M-110 TIP SNUS GUID RELIEVA FLX

## (undated) DEVICE — 1842 FOAM BLOCK NEEDLE COUNTER: Brand: DEVON

## (undated) DEVICE — ELECTRODE NDL L6.5IN S STL VERSATILE REUSE

## (undated) DEVICE — PAD N ADH W3XL4IN POLY COT SFT PERF FLM EASILY CUT ABSRB

## (undated) DEVICE — X-RAY DETECTABLE SPONGES,16 PLY: Brand: VISTEC

## (undated) DEVICE — GAUZE SPONGES,12 PLY: Brand: CURITY

## (undated) DEVICE — SYSTEM ILLUMINATION L100CM SNUS RELIEVA LUMA SENTRY

## (undated) DEVICE — INTENDED FOR TISSUE SEPARATION, AND OTHER PROCEDURES THAT REQUIRE A SHARP SURGICAL BLADE TO PUNCTURE OR CUT.: Brand: BARD-PARKER ® CARBON RIB-BACK BLADES

## (undated) DEVICE — 2000CC GUARDIAN II: Brand: GUARDIAN

## (undated) DEVICE — PENCIL ES L3M BTTN SWCH HOLSTER W/ BLDE ELECTRD EDGE

## (undated) DEVICE — LABEL MED MINI W/ MARKER

## (undated) DEVICE — GAUZE,SPONGE,2"X2",8PLY,STERILE,LF,2'S: Brand: MEDLINE

## (undated) DEVICE — ENT II-LF: Brand: MEDLINE INDUSTRIES, INC.

## (undated) DEVICE — Device

## (undated) DEVICE — Z CONVERTED USE 2271043 CONTAINER SPEC COLL 4OZ SCR ON LID PEEL PCH

## (undated) DEVICE — SYRINGE MED 10ML TRNSLUC BRL PLUNG BLK MRK POLYPR CTRL

## (undated) DEVICE — DBD-PACK,EENT,SIRUS,PK II: Brand: MEDLINE

## (undated) DEVICE — TOWEL,OR,DSP,ST,BLUE,STD,4/PK,20PK/CS: Brand: MEDLINE

## (undated) DEVICE — BLADE 1882040HR 5PK M4 INF TURB 2MM ROT: Brand: STRAIGHTSHOT

## (undated) DEVICE — SUTURE PERMAHAND SZ 2-0 L30IN NONABSORBABLE BLK L60MM KS 623H

## (undated) DEVICE — NEEDLE HYPO 25GA L1.5IN BLU POLYPR HUB S STL REG BVL STR

## (undated) DEVICE — MEDI-VAC NON-CONDUCTIVE SUCTION TUBING: Brand: CARDINAL HEALTH

## (undated) DEVICE — GLOVE ORANGE PI 7 1/2   MSG9075

## (undated) DEVICE — DEVICE INFL PRSS G INDIC DISP (MUST BE PURC IN MULTIPLES OF 5)

## (undated) DEVICE — DEFOGGER!" ANTI FOG KIT: Brand: DEROYAL

## (undated) DEVICE — ENTACT SEPTAL STAPLER 3 PACK: Brand: ENT SINUS